# Patient Record
Sex: FEMALE | Race: WHITE | NOT HISPANIC OR LATINO | Employment: OTHER | URBAN - METROPOLITAN AREA
[De-identification: names, ages, dates, MRNs, and addresses within clinical notes are randomized per-mention and may not be internally consistent; named-entity substitution may affect disease eponyms.]

---

## 2017-01-17 ENCOUNTER — OFFICE VISIT (OUTPATIENT)
Dept: CARDIOLOGY | Facility: MEDICAL CENTER | Age: 72
End: 2017-01-17
Payer: MEDICARE

## 2017-01-17 VITALS
SYSTOLIC BLOOD PRESSURE: 130 MMHG | OXYGEN SATURATION: 94 % | HEIGHT: 67 IN | DIASTOLIC BLOOD PRESSURE: 70 MMHG | WEIGHT: 239 LBS | HEART RATE: 74 BPM | BODY MASS INDEX: 37.51 KG/M2

## 2017-01-17 DIAGNOSIS — I25.83 CORONARY ARTERY DISEASE DUE TO LIPID RICH PLAQUE: Chronic | ICD-10-CM

## 2017-01-17 DIAGNOSIS — I25.810 CORONARY ARTERY DISEASE INVOLVING OTHER CORONARY ARTERY BYPASS GRAFT WITHOUT ANGINA PECTORIS: ICD-10-CM

## 2017-01-17 DIAGNOSIS — E78.5 DYSLIPIDEMIA: Chronic | ICD-10-CM

## 2017-01-17 DIAGNOSIS — Z95.5 PRESENCE OF DRUG COATED STENT IN RIGHT CORONARY ARTERY: Chronic | ICD-10-CM

## 2017-01-17 DIAGNOSIS — I25.10 CORONARY ARTERY DISEASE DUE TO LIPID RICH PLAQUE: Chronic | ICD-10-CM

## 2017-01-17 DIAGNOSIS — I10 ESSENTIAL HYPERTENSION: Chronic | ICD-10-CM

## 2017-01-17 LAB — EKG IMPRESSION: NORMAL

## 2017-01-17 PROCEDURE — 93000 ELECTROCARDIOGRAM COMPLETE: CPT | Performed by: INTERNAL MEDICINE

## 2017-01-17 PROCEDURE — 99204 OFFICE O/P NEW MOD 45 MIN: CPT | Mod: 25 | Performed by: INTERNAL MEDICINE

## 2017-01-17 RX ORDER — LOSARTAN POTASSIUM 50 MG/1
50 TABLET ORAL DAILY
COMMUNITY
End: 2017-01-17 | Stop reason: SDUPTHER

## 2017-01-17 RX ORDER — METOPROLOL SUCCINATE 50 MG/1
50 TABLET, EXTENDED RELEASE ORAL DAILY
COMMUNITY
End: 2017-01-17 | Stop reason: SDUPTHER

## 2017-01-17 RX ORDER — OMEGA-3 FATTY ACIDS/FISH OIL 300-1000MG
1 CAPSULE ORAL DAILY
COMMUNITY
End: 2021-04-05

## 2017-01-17 RX ORDER — LISINOPRIL 20 MG/1
20 TABLET ORAL DAILY
COMMUNITY
End: 2017-01-17

## 2017-01-17 RX ORDER — METOPROLOL SUCCINATE 50 MG/1
50 TABLET, EXTENDED RELEASE ORAL DAILY
Qty: 90 TAB | Refills: 3 | Status: SHIPPED | OUTPATIENT
Start: 2017-01-17 | End: 2018-01-17

## 2017-01-17 RX ORDER — LOSARTAN POTASSIUM 100 MG/1
100 TABLET ORAL DAILY
Qty: 90 TAB | Refills: 3 | Status: SHIPPED | OUTPATIENT
Start: 2017-01-17 | End: 2017-01-17 | Stop reason: SDUPTHER

## 2017-01-17 RX ORDER — ASPIRIN 81 MG/1
81 TABLET, CHEWABLE ORAL DAILY
Qty: 100 TAB | COMMUNITY
Start: 2017-01-17

## 2017-01-17 RX ORDER — METOPROLOL SUCCINATE 50 MG/1
50 TABLET, EXTENDED RELEASE ORAL DAILY
Qty: 90 TAB | Refills: 3 | Status: SHIPPED | OUTPATIENT
Start: 2017-01-17 | End: 2017-01-17 | Stop reason: SDUPTHER

## 2017-01-17 RX ORDER — LOSARTAN POTASSIUM 100 MG/1
100 TABLET ORAL DAILY
Qty: 90 TAB | Refills: 3 | Status: SHIPPED | OUTPATIENT
Start: 2017-01-17 | End: 2018-01-17 | Stop reason: SDUPTHER

## 2017-01-17 RX ORDER — ATORVASTATIN CALCIUM 20 MG/1
20 TABLET, FILM COATED ORAL DAILY
Qty: 90 TAB | Refills: 3 | Status: SHIPPED | OUTPATIENT
Start: 2017-01-17 | End: 2018-01-17 | Stop reason: SDUPTHER

## 2017-01-17 RX ORDER — ATORVASTATIN CALCIUM 20 MG/1
20 TABLET, FILM COATED ORAL NIGHTLY
COMMUNITY
End: 2017-01-17 | Stop reason: SDUPTHER

## 2017-01-17 RX ORDER — ATORVASTATIN CALCIUM 20 MG/1
20 TABLET, FILM COATED ORAL DAILY
Qty: 90 TAB | Refills: 3 | Status: SHIPPED | OUTPATIENT
Start: 2017-01-17 | End: 2017-01-17 | Stop reason: SDUPTHER

## 2017-01-17 ASSESSMENT — ENCOUNTER SYMPTOMS
NAUSEA: 0
CLAUDICATION: 0
SORE THROAT: 0
BLURRED VISION: 0
BRUISES/BLEEDS EASILY: 0
FOCAL WEAKNESS: 0
DIZZINESS: 0
PND: 0
SHORTNESS OF BREATH: 0
COUGH: 0
WEAKNESS: 0
ABDOMINAL PAIN: 0
CHILLS: 0
HEADACHES: 0
FALLS: 0
FEVER: 0
PALPITATIONS: 0
LOSS OF CONSCIOUSNESS: 0

## 2017-01-17 NOTE — MR AVS SNAPSHOT
"        Sania Viera   2017 3:15 PM   Office Visit   MRN: [id number]    Department:  Heart [facility]   Dept Phone:  [phone]    Description:  Female : 1945   Provider:  Yury Pagan M.D.           Reason for Visit     New Patient           Allergies as of 2017     Not on File      You were diagnosed with     Coronary artery disease involving other coronary artery bypass graft without angina pectoris   [3090320]       Presence of drug coated stent in right coronary artery   [886103]       Coronary artery disease due to lipid rich plaque   [6837492]       Essential hypertension   [8389807]       Dyslipidemia   [900403]         Vital Signs     Blood Pressure Pulse Height Weight Body Mass Index Oxygen Saturation    130/70 mmHg 74 1.702 m (5' 7\") 108.41 kg (239 lb) 37.42 kg/m2 94%    Smoking Status                   Former Smoker           Basic Information     Date Of Birth Sex Race Ethnicity Preferred Language    1945 Female Unable to Obtain Unknown English      Problem List              ICD-10-CM Priority Class Noted - Resolved    Presence of drug coated stent in right coronary artery (Chronic) Z95.5   Unknown - Present    Coronary artery disease due to lipid rich plaque (Chronic) I25.10, I25.83   Unknown - Present    Essential hypertension (Chronic) I10   Unknown - Present    Dyslipidemia (Chronic) E78.5   Unknown - Present      Health Maintenance     Patient has no pending health maintenance at this time      Results       Current Immunizations     No immunizations on file.      Below and/or attached are the medications your provider expects you to take. Review all of your home medications and newly ordered medications with your provider and/or pharmacist. Follow medication instructions as directed by your provider and/or pharmacist. Please keep your medication list with you and share with your provider. Update the information when medications are discontinued, doses are changed, or " new medications (including over-the-counter products) are added; and carry medication information at all times in the event of emergency situations     Allergies:  No Known Allergies          Medications  Valid as of: January 17, 2017 -  4:00 PM    Generic Name Brand Name Tablet Size Instructions for use    Atorvastatin Calcium (Tab) LIPITOR 20 MG Take 1 Tab by mouth every day.        Losartan Potassium (Tab) COZAAR 100 MG Take 1 Tab by mouth every day.        Metoprolol Succinate (TABLET SR 24 HR) TOPROL XL 50 MG Take 1 Tab by mouth every day.        Omega-3 Fatty Acids (Cap) Omega 3 1000 MG Take  by mouth.        .                 Medicines prescribed today were sent to:     [facility], [facility], [place] - [address]    [address] [address]    Phone: [phone] Fax: [phone]    Open 24 Hours?: No    [facility] - [place], [place] - [address] [facility]    [address] [facility] [address]    Phone: [phone] Fax: [phone]    Open 24 Hours?: No      Medication refill instructions:       If your prescription bottle indicates you have medication refills left, it is not necessary to call your provider’s office. Please contact your pharmacy and they will refill your medication.    If your prescription bottle indicates you do not have any refills left, you may request refills at any time through one of the following ways: The online [employer] system (except Urgent Care), by calling your provider’s office, or by asking your pharmacy to contact your provider’s office with a refill request. Medication refills are processed only during regular business hours and may not be available until the next business day. Your provider may request additional information or to have a follow-up visit with you prior to refilling your medication.   *Please Note: Medication refills are assigned a new Rx number when refilled electronically. Your pharmacy may indicate that no refills were authorized  even though a new prescription for the same medication is available at the pharmacy. Please request the medicine by name with the pharmacy before contacting your provider for a refill.        Instructions    Stop lisinopril it may be causing a cough      Increase losartan to 100 mg a day    Take aspirin 81 mg daily            MyChart Status: Patient Declined

## 2017-01-17 NOTE — PATIENT INSTRUCTIONS
Stop lisinopril it may be causing a cough      Increase losartan to 100 mg a day    Take aspirin 81 mg daily

## 2017-01-17 NOTE — Clinical Note
[facility]   [address] [facility], [facility] [address]  [place] [address]  Phone: [phone]  Fax: [phone]              Sania Viera  1945    Encounter Date: 1/17/2017    Yury Pagan M.D.          PROGRESS NOTE:  Subjective:   Sania Viera is a 71 y.o. female who presents today in consultation from Dr. Virk to establish care with a prior history of coronary artery disease status post stenting in 2005.      She reports long-term blood pressure has been intermittently compliant with medications, somewhat due to financing somewhat due to her preferences.  She doesn't believe she had significant side effect but on questioning likely has ACE inhibitor induced cough    She reports in 2006 she was feeling unwell and underwent workup including angiogram she was found to have significant narrowing in the right coronary artery and underwent successful stenting with a drug-eluting stent, she believes she was told she had residual high-grade narrowing in the left artery but it was not amenable to stenting.      Past Medical History   Diagnosis Date   • Presence of drug coated stent in right coronary artery    • Coronary artery disease due to lipid rich plaque    • Essential hypertension    • Dyslipidemia      Past Surgical History   Procedure Laterality Date   • Cardiac cath     • Angioplasty  5/2006     RCA IVANIA in [facility]     Family History   Problem Relation Age of Onset   • Clotting Disorder Mother    • Heart Attack Father    • Heart Disease Brother    • Diabetes Brother      History   Smoking status   • Former Smoker   Smokeless tobacco   • Not on file     Allergies   Allergen Reactions   • Iodine Rash and Itching     She reports rash and severe significant itching after her cardiac catheterization in 2006, unclear if this was the topical scrub or related to IV contrast, for these reasons she does avoid shellfish     Outpatient Encounter Prescriptions as of  "1/17/2017   Medication Sig Dispense Refill   • Omega 3 1000 MG Cap Take  by mouth.     • atorvastatin (LIPITOR) 20 MG Tab Take 1 Tab by mouth every day. 90 Tab 3   • losartan (COZAAR) 100 MG Tab Take 1 Tab by mouth every day. 90 Tab 3   • metoprolol SR (TOPROL XL) 50 MG TABLET SR 24 HR Take 1 Tab by mouth every day. 90 Tab 3   • [DISCONTINUED] atorvastatin (LIPITOR) 20 MG Tab Take 20 mg by mouth every evening.     • [DISCONTINUED] losartan (COZAAR) 50 MG Tab Take 50 mg by mouth every day.     • [DISCONTINUED] lisinopril (PRINIVIL) 20 MG Tab Take 20 mg by mouth every day.     • [DISCONTINUED] metoprolol SR (TOPROL XL) 50 MG TABLET SR 24 HR Take 50 mg by mouth every day.     • [DISCONTINUED] atorvastatin (LIPITOR) 20 MG Tab Take 1 Tab by mouth every day. 90 Tab 3   • [DISCONTINUED] losartan (COZAAR) 100 MG Tab Take 1 Tab by mouth every day. 90 Tab 3   • [DISCONTINUED] metoprolol SR (TOPROL XL) 50 MG TABLET SR 24 HR Take 1 Tab by mouth every day. 90 Tab 3     No facility-administered encounter medications on file as of 1/17/2017.     Review of Systems   Constitutional: Negative for fever and chills.   HENT: Negative for sore throat.    Eyes: Negative for blurred vision.   Respiratory: Negative for cough and shortness of breath.    Cardiovascular: Negative for chest pain, palpitations, claudication, leg swelling and PND.   Gastrointestinal: Negative for nausea and abdominal pain.   Musculoskeletal: Negative for falls.   Skin: Negative for rash.   Neurological: Negative for dizziness, focal weakness, loss of consciousness, weakness and headaches.   Endo/Heme/Allergies: Does not bruise/bleed easily.        Objective:   /70 mmHg  Pulse 74  Ht 1.702 m (5' 7\")  Wt 108.41 kg (239 lb)  BMI 37.42 kg/m2  SpO2 94%    Physical Exam   Constitutional: No distress.   HENT:   Mouth/Throat: Oropharynx is clear and moist.   Eyes: No scleral icterus.   Cardiovascular: Normal rate, regular rhythm and intact distal pulses.  Exam " "reveals no gallop and no friction rub.    No murmur heard.  Pulmonary/Chest: Effort normal and breath sounds normal. She has no rales.   Abdominal: Bowel sounds are normal. There is no tenderness.   Musculoskeletal: She exhibits no edema.   Neurological: She is alert.   Skin: No rash noted. She is not diaphoretic.   Psychiatric: She has a normal mood and affect.     We reviewed her recent labs A1c was 6.2 lipids were at goal on statin. LDL 69 HDL 40    Assessment:     1. Coronary artery disease involving other coronary artery bypass graft without angina pectoris  EKG    atorvastatin (LIPITOR) 20 MG Tab    metoprolol SR (TOPROL XL) 50 MG TABLET SR 24 HR    DISCONTINUED: atorvastatin (LIPITOR) 20 MG Tab    DISCONTINUED: metoprolol SR (TOPROL XL) 50 MG TABLET SR 24 HR   2. Presence of drug coated stent in right coronary artery     3. Coronary artery disease due to lipid rich plaque  atorvastatin (LIPITOR) 20 MG Tab    DISCONTINUED: atorvastatin (LIPITOR) 20 MG Tab   4. Essential hypertension  losartan (COZAAR) 100 MG Tab    metoprolol SR (TOPROL XL) 50 MG TABLET SR 24 HR    DISCONTINUED: losartan (COZAAR) 100 MG Tab    DISCONTINUED: metoprolol SR (TOPROL XL) 50 MG TABLET SR 24 HR   5. Dyslipidemia         Medical Decision Making:  Today's Assessment / Status / Plan:     It was my pleasure to meet with Ms. Viera.    She is accompanied by her daughter-in-law    I renewed her heart medications, given the concern of cough with the ACE inhibitor I've asked her to stop that she was taking losartan and lisinopril together and so I increased her losartan to 100 from 50 mg    Her lipids are well-controlled on moderate dose statin    Encouraged to take a baby aspirin every day    She'll let us know for significant swelling or other heart failure symptoms we'll obtain her prior records she does report \"normal\" stress test about 2 years ago.    I will see Ms. Viera back in 1 year time and encouraged her to follow up with us over " the phone or e-mail using my MyChart as issues arise.    It is my pleasure to participate in the care of Ms. Viera.  Please do not hesitate to contact me with questions or concerns.    Yury Pagan MD PhD [facility]  Cardiologist [facility]        Yury Virk M.D.  [address] [place] [id number]  VIA Facsimile: [phone]

## 2017-01-18 NOTE — PROGRESS NOTES
Subjective:   Sania Viera is a 71 y.o. female who presents today in consultation from Dr. Virk to establish care with a prior history of coronary artery disease status post stenting in 2005.      She reports long-term blood pressure has been intermittently compliant with medications, somewhat due to financing somewhat due to her preferences.  She doesn't believe she had significant side effect but on questioning likely has ACE inhibitor induced cough    She reports in 2006 she was feeling unwell and underwent workup including angiogram she was found to have significant narrowing in the right coronary artery and underwent successful stenting with a drug-eluting stent, she believes she was told she had residual high-grade narrowing in the left artery but it was not amenable to stenting.      Past Medical History   Diagnosis Date   • Presence of drug coated stent in right coronary artery    • Coronary artery disease due to lipid rich plaque    • Essential hypertension    • Dyslipidemia      Past Surgical History   Procedure Laterality Date   • Cardiac cath     • Angioplasty  5/2006     RCA IVANIA in [facility]     Family History   Problem Relation Age of Onset   • Clotting Disorder Mother    • Heart Attack Father    • Heart Disease Brother    • Diabetes Brother      History   Smoking status   • Former Smoker   Smokeless tobacco   • Not on file     Allergies   Allergen Reactions   • Iodine Rash and Itching     She reports rash and severe significant itching after her cardiac catheterization in 2006, unclear if this was the topical scrub or related to IV contrast, for these reasons she does avoid shellfish     Outpatient Encounter Prescriptions as of 1/17/2017   Medication Sig Dispense Refill   • Omega 3 1000 MG Cap Take  by mouth.     • atorvastatin (LIPITOR) 20 MG Tab Take 1 Tab by mouth every day. 90 Tab 3   • losartan (COZAAR) 100 MG Tab Take 1 Tab by mouth every day. 90 Tab 3   • metoprolol SR (TOPROL XL) 50 MG  "TABLET SR 24 HR Take 1 Tab by mouth every day. 90 Tab 3   • [DISCONTINUED] atorvastatin (LIPITOR) 20 MG Tab Take 20 mg by mouth every evening.     • [DISCONTINUED] losartan (COZAAR) 50 MG Tab Take 50 mg by mouth every day.     • [DISCONTINUED] lisinopril (PRINIVIL) 20 MG Tab Take 20 mg by mouth every day.     • [DISCONTINUED] metoprolol SR (TOPROL XL) 50 MG TABLET SR 24 HR Take 50 mg by mouth every day.     • [DISCONTINUED] atorvastatin (LIPITOR) 20 MG Tab Take 1 Tab by mouth every day. 90 Tab 3   • [DISCONTINUED] losartan (COZAAR) 100 MG Tab Take 1 Tab by mouth every day. 90 Tab 3   • [DISCONTINUED] metoprolol SR (TOPROL XL) 50 MG TABLET SR 24 HR Take 1 Tab by mouth every day. 90 Tab 3     No facility-administered encounter medications on file as of 1/17/2017.     Review of Systems   Constitutional: Negative for fever and chills.   HENT: Negative for sore throat.    Eyes: Negative for blurred vision.   Respiratory: Negative for cough and shortness of breath.    Cardiovascular: Negative for chest pain, palpitations, claudication, leg swelling and PND.   Gastrointestinal: Negative for nausea and abdominal pain.   Musculoskeletal: Negative for falls.   Skin: Negative for rash.   Neurological: Negative for dizziness, focal weakness, loss of consciousness, weakness and headaches.   Endo/Heme/Allergies: Does not bruise/bleed easily.        Objective:   /70 mmHg  Pulse 74  Ht 1.702 m (5' 7\")  Wt 108.41 kg (239 lb)  BMI 37.42 kg/m2  SpO2 94%    Physical Exam   Constitutional: No distress.   HENT:   Mouth/Throat: Oropharynx is clear and moist.   Eyes: No scleral icterus.   Cardiovascular: Normal rate, regular rhythm and intact distal pulses.  Exam reveals no gallop and no friction rub.    No murmur heard.  Pulmonary/Chest: Effort normal and breath sounds normal. She has no rales.   Abdominal: Bowel sounds are normal. There is no tenderness.   Musculoskeletal: She exhibits no edema.   Neurological: She is alert. " "  Skin: No rash noted. She is not diaphoretic.   Psychiatric: She has a normal mood and affect.     We reviewed her recent labs A1c was 6.2 lipids were at goal on statin. LDL 69 HDL 40    Assessment:     1. Coronary artery disease involving other coronary artery bypass graft without angina pectoris  EKG    atorvastatin (LIPITOR) 20 MG Tab    metoprolol SR (TOPROL XL) 50 MG TABLET SR 24 HR    DISCONTINUED: atorvastatin (LIPITOR) 20 MG Tab    DISCONTINUED: metoprolol SR (TOPROL XL) 50 MG TABLET SR 24 HR   2. Presence of drug coated stent in right coronary artery     3. Coronary artery disease due to lipid rich plaque  atorvastatin (LIPITOR) 20 MG Tab    DISCONTINUED: atorvastatin (LIPITOR) 20 MG Tab   4. Essential hypertension  losartan (COZAAR) 100 MG Tab    metoprolol SR (TOPROL XL) 50 MG TABLET SR 24 HR    DISCONTINUED: losartan (COZAAR) 100 MG Tab    DISCONTINUED: metoprolol SR (TOPROL XL) 50 MG TABLET SR 24 HR   5. Dyslipidemia         Medical Decision Making:  Today's Assessment / Status / Plan:     It was my pleasure to meet with Ms. Viera.    She is accompanied by her daughter-in-law    I renewed her heart medications, given the concern of cough with the ACE inhibitor I've asked her to stop that she was taking losartan and lisinopril together and so I increased her losartan to 100 from 50 mg    Her lipids are well-controlled on moderate dose statin    Encouraged to take a baby aspirin every day    She'll let us know for significant swelling or other heart failure symptoms we'll obtain her prior records she does report \"normal\" stress test about 2 years ago.    I will see Ms. Viera back in 1 year time and encouraged her to follow up with us over the phone or e-mail using my MyChart as issues arise.    It is my pleasure to participate in the care of Ms. Viera.  Please do not hesitate to contact me with questions or concerns.    Yury Pagan MD PhD FACC  Cardiologist [facility] " [facility]

## 2017-03-02 ENCOUNTER — HOSPITAL ENCOUNTER (OUTPATIENT)
Facility: MEDICAL CENTER | Age: 72
End: 2017-03-02
Attending: FAMILY MEDICINE
Payer: MEDICARE

## 2017-03-02 LAB
ALBUMIN SERPL BCP-MCNC: 3.8 G/DL (ref 3.2–4.9)
ALBUMIN/GLOB SERPL: 1 G/DL
ALP SERPL-CCNC: 66 U/L (ref 30–99)
ALT SERPL-CCNC: 24 U/L (ref 2–50)
ANION GAP SERPL CALC-SCNC: 8 MMOL/L (ref 0–11.9)
AST SERPL-CCNC: 32 U/L (ref 12–45)
BILIRUB SERPL-MCNC: 0.4 MG/DL (ref 0.1–1.5)
BNP SERPL-MCNC: 75 PG/ML (ref 0–100)
BUN SERPL-MCNC: 14 MG/DL (ref 8–22)
CALCIUM SERPL-MCNC: 9.4 MG/DL (ref 8.5–10.5)
CHLORIDE SERPL-SCNC: 103 MMOL/L (ref 96–112)
CHOLEST SERPL-MCNC: 148 MG/DL (ref 100–199)
CO2 SERPL-SCNC: 29 MMOL/L (ref 20–33)
CREAT SERPL-MCNC: 0.73 MG/DL (ref 0.5–1.4)
CREAT UR-MCNC: 144.5 MG/DL
ERYTHROCYTE [DISTWIDTH] IN BLOOD BY AUTOMATED COUNT: 45.9 FL (ref 35.9–50)
EST. AVERAGE GLUCOSE BLD GHB EST-MCNC: 126 MG/DL
GLOBULIN SER CALC-MCNC: 3.8 G/DL (ref 1.9–3.5)
GLUCOSE SERPL-MCNC: 94 MG/DL (ref 65–99)
HBA1C MFR BLD: 6 % (ref 0–5.6)
HCT VFR BLD AUTO: 48.9 % (ref 37–47)
HDLC SERPL-MCNC: 39 MG/DL
HGB BLD-MCNC: 15.1 G/DL (ref 12–16)
LDLC SERPL CALC-MCNC: 75 MG/DL
MCH RBC QN AUTO: 29.8 PG (ref 27–33)
MCHC RBC AUTO-ENTMCNC: 30.9 G/DL (ref 33.6–35)
MCV RBC AUTO: 96.6 FL (ref 81.4–97.8)
MICROALBUMIN UR-MCNC: 42.9 MG/DL
MICROALBUMIN/CREAT UR: 297 MG/G (ref 0–30)
PLATELET # BLD AUTO: 238 K/UL (ref 164–446)
PMV BLD AUTO: 10.5 FL (ref 9–12.9)
POTASSIUM SERPL-SCNC: 4.2 MMOL/L (ref 3.6–5.5)
PROT SERPL-MCNC: 7.6 G/DL (ref 6–8.2)
RBC # BLD AUTO: 5.06 M/UL (ref 4.2–5.4)
SODIUM SERPL-SCNC: 140 MMOL/L (ref 135–145)
TRIGL SERPL-MCNC: 170 MG/DL (ref 0–149)
WBC # BLD AUTO: 5.7 K/UL (ref 4.8–10.8)

## 2017-03-02 PROCEDURE — 80061 LIPID PANEL: CPT

## 2017-03-02 PROCEDURE — 82043 UR ALBUMIN QUANTITATIVE: CPT

## 2017-03-02 PROCEDURE — 80053 COMPREHEN METABOLIC PANEL: CPT

## 2017-03-02 PROCEDURE — 83036 HEMOGLOBIN GLYCOSYLATED A1C: CPT

## 2017-03-02 PROCEDURE — 83880 ASSAY OF NATRIURETIC PEPTIDE: CPT

## 2017-03-02 PROCEDURE — 82570 ASSAY OF URINE CREATININE: CPT

## 2017-03-02 PROCEDURE — 85027 COMPLETE CBC AUTOMATED: CPT

## 2017-03-13 ENCOUNTER — HOSPITAL ENCOUNTER (OUTPATIENT)
Dept: RADIOLOGY | Facility: MEDICAL CENTER | Age: 72
End: 2017-03-13
Attending: FAMILY MEDICINE
Payer: MEDICARE

## 2017-03-13 DIAGNOSIS — M81.0 OSTEOPOROSIS, UNSPECIFIED: ICD-10-CM

## 2017-03-13 DIAGNOSIS — Z12.31 SCREENING MAMMOGRAM, ENCOUNTER FOR: ICD-10-CM

## 2017-03-13 PROCEDURE — 77080 DXA BONE DENSITY AXIAL: CPT

## 2017-03-13 PROCEDURE — 77063 BREAST TOMOSYNTHESIS BI: CPT

## 2017-03-22 ENCOUNTER — HOSPITAL ENCOUNTER (OUTPATIENT)
Facility: MEDICAL CENTER | Age: 72
End: 2017-03-22
Attending: NURSE PRACTITIONER
Payer: MEDICARE

## 2017-03-22 LAB — AMBIGUOUS DTTM AMBI4: NORMAL

## 2017-03-22 PROCEDURE — 87624 HPV HI-RISK TYP POOLED RSLT: CPT

## 2017-03-22 PROCEDURE — 87491 CHLMYD TRACH DNA AMP PROBE: CPT | Mod: 91

## 2017-03-22 PROCEDURE — 87591 N.GONORRHOEAE DNA AMP PROB: CPT | Mod: 91

## 2017-03-22 PROCEDURE — 88175 CYTOPATH C/V AUTO FLUID REDO: CPT

## 2017-04-18 ENCOUNTER — HOSPITAL ENCOUNTER (OUTPATIENT)
Dept: RADIOLOGY | Facility: MEDICAL CENTER | Age: 72
End: 2017-04-18
Attending: FAMILY MEDICINE
Payer: MEDICARE

## 2017-04-18 DIAGNOSIS — R92.8 ABNORMAL MAMMOGRAM OF RIGHT BREAST: ICD-10-CM

## 2017-04-18 PROCEDURE — 76642 ULTRASOUND BREAST LIMITED: CPT | Mod: RT

## 2017-04-18 PROCEDURE — G0279 TOMOSYNTHESIS, MAMMO: HCPCS | Mod: RT

## 2017-04-19 ENCOUNTER — TELEPHONE (OUTPATIENT)
Dept: RADIOLOGY | Facility: MEDICAL CENTER | Age: 72
End: 2017-04-19

## 2017-04-19 NOTE — TELEPHONE ENCOUNTER
ATTEMPTED TO CONTACT PT & ADVISE NO NEED TO HOLD MEDICATION AS SHE TAKES BLOOD PRESSURE MEDS, NOT BLOOD THINNERS; UNABLE TO LEAVE [facility] AS VM BOX NOT SET UP; WILL ATTEMPT CALL LATER TODAY/TML

## 2017-04-19 NOTE — TELEPHONE ENCOUNTER
ADVISED PT THAT THE MEDICATIONS SHE TAKES ARE FOR BLOOD PRESSURE, THEY ARE NOT BLOOD THINNERS; NO NEED TO HOLD FOR BX APPT; PT WAS ADVISED SHE HAS OPTION TO HOLD ASPIRIN/TML

## 2017-04-20 ENCOUNTER — TELEPHONE (OUTPATIENT)
Dept: RADIOLOGY | Facility: MEDICAL CENTER | Age: 72
End: 2017-04-20

## 2017-04-21 ENCOUNTER — HOSPITAL ENCOUNTER (OUTPATIENT)
Dept: RADIOLOGY | Facility: MEDICAL CENTER | Age: 72
End: 2017-04-21
Attending: FAMILY MEDICINE
Payer: MEDICARE

## 2017-04-21 DIAGNOSIS — R92.8 ABNORMAL MAMMOGRAM: ICD-10-CM

## 2017-04-21 PROCEDURE — 88305 TISSUE EXAM BY PATHOLOGIST: CPT | Mod: 59

## 2017-04-21 PROCEDURE — 19083 BX BREAST 1ST LESION US IMAG: CPT

## 2017-04-21 PROCEDURE — 19084 BX BREAST ADD LESION US IMAG: CPT

## 2017-04-24 ENCOUNTER — TELEPHONE (OUTPATIENT)
Dept: RADIOLOGY | Facility: MEDICAL CENTER | Age: 72
End: 2017-04-24

## 2017-07-05 ENCOUNTER — HOSPITAL ENCOUNTER (OUTPATIENT)
Facility: MEDICAL CENTER | Age: 72
End: 2017-07-05
Attending: FAMILY MEDICINE
Payer: MEDICARE

## 2017-07-05 LAB — GFR SERPL CREATININE-BSD FRML MDRD: >60 ML/MIN/1.73 M 2

## 2017-07-05 PROCEDURE — 82043 UR ALBUMIN QUANTITATIVE: CPT

## 2017-07-05 PROCEDURE — 80053 COMPREHEN METABOLIC PANEL: CPT

## 2017-07-05 PROCEDURE — 84439 ASSAY OF FREE THYROXINE: CPT

## 2017-07-05 PROCEDURE — 84443 ASSAY THYROID STIM HORMONE: CPT

## 2017-07-05 PROCEDURE — 82570 ASSAY OF URINE CREATININE: CPT

## 2017-07-05 PROCEDURE — 85025 COMPLETE CBC W/AUTO DIFF WBC: CPT

## 2017-07-05 PROCEDURE — 83036 HEMOGLOBIN GLYCOSYLATED A1C: CPT

## 2017-07-05 PROCEDURE — 83880 ASSAY OF NATRIURETIC PEPTIDE: CPT

## 2017-07-06 LAB
ALBUMIN SERPL BCP-MCNC: 3.8 G/DL (ref 3.2–4.9)
ALBUMIN/GLOB SERPL: 1.2 G/DL
ALP SERPL-CCNC: 63 U/L (ref 30–99)
ALT SERPL-CCNC: 31 U/L (ref 2–50)
ANION GAP SERPL CALC-SCNC: 9 MMOL/L (ref 0–11.9)
AST SERPL-CCNC: 39 U/L (ref 12–45)
BASOPHILS # BLD AUTO: 0.6 % (ref 0–1.8)
BASOPHILS # BLD: 0.04 K/UL (ref 0–0.12)
BILIRUB SERPL-MCNC: 0.5 MG/DL (ref 0.1–1.5)
BNP SERPL-MCNC: 51 PG/ML (ref 0–100)
BUN SERPL-MCNC: 13 MG/DL (ref 8–22)
CALCIUM SERPL-MCNC: 9.1 MG/DL (ref 8.5–10.5)
CHLORIDE SERPL-SCNC: 106 MMOL/L (ref 96–112)
CO2 SERPL-SCNC: 30 MMOL/L (ref 20–33)
CREAT SERPL-MCNC: 0.87 MG/DL (ref 0.5–1.4)
CREAT UR-MCNC: 184.9 MG/DL
EOSINOPHIL # BLD AUTO: 0.11 K/UL (ref 0–0.51)
EOSINOPHIL NFR BLD: 1.6 % (ref 0–6.9)
ERYTHROCYTE [DISTWIDTH] IN BLOOD BY AUTOMATED COUNT: 47.8 FL (ref 35.9–50)
EST. AVERAGE GLUCOSE BLD GHB EST-MCNC: 126 MG/DL
GLOBULIN SER CALC-MCNC: 3.3 G/DL (ref 1.9–3.5)
GLUCOSE SERPL-MCNC: 109 MG/DL (ref 65–99)
HBA1C MFR BLD: 6 % (ref 0–5.6)
HCT VFR BLD AUTO: 46.9 % (ref 37–47)
HGB BLD-MCNC: 14.3 G/DL (ref 12–16)
IMM GRANULOCYTES # BLD AUTO: 0.03 K/UL (ref 0–0.11)
IMM GRANULOCYTES NFR BLD AUTO: 0.4 % (ref 0–0.9)
LYMPHOCYTES # BLD AUTO: 2.35 K/UL (ref 1–4.8)
LYMPHOCYTES NFR BLD: 33.8 % (ref 22–41)
MCH RBC QN AUTO: 29.9 PG (ref 27–33)
MCHC RBC AUTO-ENTMCNC: 30.5 G/DL (ref 33.6–35)
MCV RBC AUTO: 98.1 FL (ref 81.4–97.8)
MICROALBUMIN UR-MCNC: 13 MG/DL
MICROALBUMIN/CREAT UR: 70 MG/G (ref 0–30)
MONOCYTES # BLD AUTO: 0.72 K/UL (ref 0–0.85)
MONOCYTES NFR BLD AUTO: 10.3 % (ref 0–13.4)
NEUTROPHILS # BLD AUTO: 3.71 K/UL (ref 2–7.15)
NEUTROPHILS NFR BLD: 53.3 % (ref 44–72)
NRBC # BLD AUTO: 0 K/UL
NRBC BLD AUTO-RTO: 0 /100 WBC
PLATELET # BLD AUTO: 204 K/UL (ref 164–446)
PMV BLD AUTO: 10.4 FL (ref 9–12.9)
POTASSIUM SERPL-SCNC: 4 MMOL/L (ref 3.6–5.5)
PROT SERPL-MCNC: 7.1 G/DL (ref 6–8.2)
RBC # BLD AUTO: 4.78 M/UL (ref 4.2–5.4)
SODIUM SERPL-SCNC: 145 MMOL/L (ref 135–145)
T4 FREE SERPL-MCNC: 0.82 NG/DL (ref 0.53–1.43)
TSH SERPL DL<=0.005 MIU/L-ACNC: 4.01 UIU/ML (ref 0.3–3.7)
WBC # BLD AUTO: 7 K/UL (ref 4.8–10.8)

## 2017-07-21 ENCOUNTER — OFFICE VISIT (OUTPATIENT)
Dept: CARDIOLOGY | Facility: MEDICAL CENTER | Age: 72
End: 2017-07-21
Payer: MEDICARE

## 2017-07-21 VITALS
BODY MASS INDEX: 37.67 KG/M2 | HEART RATE: 68 BPM | HEIGHT: 67 IN | OXYGEN SATURATION: 96 % | SYSTOLIC BLOOD PRESSURE: 170 MMHG | WEIGHT: 240 LBS | DIASTOLIC BLOOD PRESSURE: 90 MMHG

## 2017-07-21 DIAGNOSIS — I10 ESSENTIAL HYPERTENSION: Chronic | ICD-10-CM

## 2017-07-21 DIAGNOSIS — E66.9 OBESITY, UNSPECIFIED OBESITY SEVERITY, UNSPECIFIED OBESITY TYPE: ICD-10-CM

## 2017-07-21 DIAGNOSIS — I25.10 CORONARY ARTERY DISEASE DUE TO LIPID RICH PLAQUE: Chronic | ICD-10-CM

## 2017-07-21 DIAGNOSIS — I25.83 CORONARY ARTERY DISEASE DUE TO LIPID RICH PLAQUE: Chronic | ICD-10-CM

## 2017-07-21 DIAGNOSIS — Z95.5 PRESENCE OF DRUG COATED STENT IN RIGHT CORONARY ARTERY: Chronic | ICD-10-CM

## 2017-07-21 DIAGNOSIS — E78.5 DYSLIPIDEMIA: Chronic | ICD-10-CM

## 2017-07-21 PROCEDURE — 99214 OFFICE O/P EST MOD 30 MIN: CPT | Performed by: NURSE PRACTITIONER

## 2017-07-21 ASSESSMENT — ENCOUNTER SYMPTOMS
INSOMNIA: 1
ORTHOPNEA: 0
CLAUDICATION: 0
COUGH: 0
FEVER: 0
DIZZINESS: 0
SHORTNESS OF BREATH: 1
PND: 0
ABDOMINAL PAIN: 0
MYALGIAS: 0
PALPITATIONS: 0

## 2017-07-21 NOTE — MR AVS SNAPSHOT
"Sania Viera   2017 12:40 PM   Office Visit   MRN: [id number]    Department:  [facility]   Dept Phone:  [phone]    Description:  Female : 1945   Provider:  ONI Be           Reason for Visit     Follow-Up           Allergies as of 2017     Allergen Noted Reactions    Iodine 2017   Rash, Itching    She reports rash and severe significant itching after her cardiac catheterization in , unclear if this was the topical scrub or related to IV contrast, for these reasons she does avoid shellfish      You were diagnosed with     Coronary artery disease due to lipid rich plaque   [2240988]       Dyslipidemia   [441645]       Essential hypertension   [5808011]       Presence of drug coated stent in right coronary artery   [521854]       Obesity, unspecified obesity severity, unspecified obesity type   [4657163]         Vital Signs     Blood Pressure Pulse Height Weight Body Mass Index Oxygen Saturation    170/90 mmHg 68 1.702 m (5' 7.01\") 108.863 kg (240 lb) 37.58 kg/m2 96%    Smoking Status                   Former Smoker           Basic Information     Date Of Birth Sex Race Ethnicity Preferred Language    1945 Female Unable to Obtain Unknown English      Your appointments     2017  2:00 PM   Nm [facility] with [facility] NM CARDIAC PET   [facility])    [address] [facility] [id number] [phone]            2018 12:45 PM   FOLLOW UP with Yury Pagan M.D.   [facility] (--)    [address], [facility] [address]  [facility] [id number] [phone]              Problem List              ICD-10-CM Priority Class Noted - Resolved    Presence of drug coated stent in right coronary artery (Chronic) Z95.5 Medium  Unknown - Present    Coronary artery disease due to lipid rich plaque (Chronic) I25.10, I25.83 Medium  Unknown - Present    Essential hypertension " (Chronic) I10 Medium  Unknown - Present    Dyslipidemia (Chronic) E78.5 Medium  Unknown - Present    Obesity E66.9 Medium  7/21/2017 - Present      Health Maintenance        Date Due Completion Dates    IMM DTaP/Tdap/Td Vaccine (1 - Tdap) 12/6/1966 ---    COLONOSCOPY 12/6/1997 ---    IMM ZOSTER VACCINE 12/6/2007 ---    IMM PNEUMOCOCCAL 65+ (ADULT) LOW/MEDIUM RISK SERIES (1 of 2 - PCV13) 12/6/2012 ---    IMM INFLUENZA (1) 9/1/2017 ---    MAMMOGRAM 4/18/2018 4/18/2017, 3/13/2017    PAP SMEAR 3/22/2020 3/22/2017    BONE DENSITY 3/13/2022 3/13/2017            Current Immunizations     No immunizations on file.      Below and/or attached are the medications your provider expects you to take. Review all of your home medications and newly ordered medications with your provider and/or pharmacist. Follow medication instructions as directed by your provider and/or pharmacist. Please keep your medication list with you and share with your provider. Update the information when medications are discontinued, doses are changed, or new medications (including over-the-counter products) are added; and carry medication information at all times in the event of emergency situations     Allergies:  IODINE - Rash,Itching               Medications  Valid as of: July 21, 2017 -  1:28 PM    Generic Name Brand Name Tablet Size Instructions for use    Aspirin (Chew Tab) ASA 81 MG Take 1 Tab by mouth every day.        Atorvastatin Calcium (Tab) LIPITOR 20 MG Take 1 Tab by mouth every day.        Cholecalciferol (Tab) cholecalciferol 1000 UNIT Take 1,000 Units by mouth every day.        Losartan Potassium (Tab) COZAAR 100 MG Take 1 Tab by mouth every day.        Metoprolol Succinate (TABLET SR 24 HR) TOPROL XL 50 MG Take 1 Tab by mouth every day.        Omega-3 Fatty Acids (Cap) Omega 3 1000 MG Take  by mouth.        .                 Medicines prescribed today were sent to:     [facility] [place], [place] - [address]     [address] [address]    Phone: [phone] Fax: [phone]    Open 24 Hours?: No      Medication refill instructions:       If your prescription bottle indicates you have medication refills left, it is not necessary to call your provider’s office. Please contact your pharmacy and they will refill your medication.    If your prescription bottle indicates you do not have any refills left, you may request refills at any time through one of the following ways: The online [employer] system (except Urgent Care), by calling your provider’s office, or by asking your pharmacy to contact your provider’s office with a refill request. Medication refills are processed only during regular business hours and may not be available until the next business day. Your provider may request additional information or to have a follow-up visit with you prior to refilling your medication.   *Please Note: Medication refills are assigned a new Rx number when refilled electronically. Your pharmacy may indicate that no refills were authorized even though a new prescription for the same medication is available at the pharmacy. Please request the medicine by name with the pharmacy before contacting your provider for a refill.        Your To Do List     Future Labs/Procedures Complete By Expires    NM-CARDIAC PET  As directed 7/21/2018         [employer] Status: Patient Declined

## 2017-07-21 NOTE — PROGRESS NOTES
Subjective:   Sania Viera is a 71 y.o. female who presents today for follow up for recurrent chest discomfort and LE swelling.    She is a patient of Dr. Pagan in our office. Hx of HTN, HLD, CAD with IVANIA to OM, and obesity.    She states she has been having some intermittent chest pain and shortness of breath with exertion. She also has more leg swelling. She is wearing her compression stockings today, but it is still pitting.    She is having a breast biopsy soon. We received her previous angiogram from '06.     She had no episodes of palpitations or dizziness/lightheadedness.    Past Medical History   Diagnosis Date   • Essential hypertension    • Dyslipidemia    • CAD (coronary artery disease) 2005     IVANIA to OM   • Obesity      Past Surgical History   Procedure Laterality Date   • Cardiac cath     • Angioplasty  5/2006     RCA IVANIA in [facility]     Family History   Problem Relation Age of Onset   • Clotting Disorder Mother    • Heart Attack Father    • Heart Disease Brother    • Diabetes Brother      History   Smoking status   • Former Smoker   Smokeless tobacco   • Not on file     Allergies   Allergen Reactions   • Iodine Rash and Itching     She reports rash and severe significant itching after her cardiac catheterization in 2006, unclear if this was the topical scrub or related to IV contrast, for these reasons she does avoid shellfish     Outpatient Encounter Prescriptions as of 7/21/2017   Medication Sig Dispense Refill   • vitamin D (CHOLECALCIFEROL) 1000 UNIT Tab Take 1,000 Units by mouth every day.     • Omega 3 1000 MG Cap Take  by mouth.     • atorvastatin (LIPITOR) 20 MG Tab Take 1 Tab by mouth every day. 90 Tab 3   • losartan (COZAAR) 100 MG Tab Take 1 Tab by mouth every day. 90 Tab 3   • metoprolol SR (TOPROL XL) 50 MG TABLET SR 24 HR Take 1 Tab by mouth every day. 90 Tab 3   • aspirin (ASA) 81 MG Chew Tab chewable tablet Take 1 Tab by mouth every day. 100 Tab      No facility-administered  "encounter medications on file as of 7/21/2017.     Review of Systems   Constitutional: Positive for malaise/fatigue. Negative for fever.   Respiratory: Positive for shortness of breath. Negative for cough.    Cardiovascular: Positive for chest pain and leg swelling. Negative for palpitations, orthopnea, claudication and PND.   Gastrointestinal: Negative for abdominal pain.   Musculoskeletal: Negative for myalgias.   Neurological: Negative for dizziness.   Psychiatric/Behavioral: The patient has insomnia.    All other systems reviewed and are negative.       Objective:   /90 mmHg  Pulse 68  Ht 1.702 m (5' 7.01\")  Wt 108.863 kg (240 lb)  BMI 37.58 kg/m2  SpO2 96%    Physical Exam   Constitutional: She is oriented to person, place, and time. She appears well-developed. No distress.   obese   HENT:   Head: Normocephalic and atraumatic.   Eyes: EOM are normal.   Neck: Normal range of motion. No JVD present.   Cardiovascular: Normal rate, regular rhythm, normal heart sounds and intact distal pulses.    No murmur heard.  Pulmonary/Chest: Effort normal and breath sounds normal. No respiratory distress.   Abdominal: Soft. Bowel sounds are normal.   Musculoskeletal: Normal range of motion. She exhibits edema.   Bilateral LE edema 1+ pitting   Neurological: She is alert and oriented to person, place, and time.   Skin: Skin is warm and dry.   Psychiatric: She has a normal mood and affect.   Nursing note and vitals reviewed.    Lab Results   Component Value Date/Time    CHOLESTEROL, 03/02/2017 10:20 AM    LDL 75 03/02/2017 10:20 AM    HDL 39* 03/02/2017 10:20 AM    TRIGLYCERIDES 170* 03/02/2017 10:20 AM       Lab Results   Component Value Date/Time    SODIUM 145 07/05/2017 11:30 AM    POTASSIUM 4.0 07/05/2017 11:30 AM    CHLORIDE 106 07/05/2017 11:30 AM    CO2 30 07/05/2017 11:30 AM    GLUCOSE 109* 07/05/2017 11:30 AM    BUN 13 07/05/2017 11:30 AM    CREATININE 0.87 07/05/2017 11:30 AM     Lab Results "   Component Value Date/Time    ALKALINE PHOSPHATASE 63 07/05/2017 11:30 AM    AST(SGOT) 39 07/05/2017 11:30 AM    ALT(SGPT) 31 07/05/2017 11:30 AM    TOTAL BILIRUBIN 0.5 07/05/2017 11:30 AM      Assessment:     1. Coronary artery disease due to lipid rich plaque  NM-CARDIAC PET   2. Dyslipidemia  NM-CARDIAC PET   3. Essential hypertension  NM-CARDIAC PET   4. Presence of drug coated stent in right coronary artery  NM-CARDIAC PET   5. Obesity, unspecified obesity severity, unspecified obesity type  NM-CARDIAC PET     Medical Decision Making:  Today's Assessment / Status / Plan:     1. CAD with previous IVANIA to OM, mild-mod residual disease in RCA and LAD. Continue ASA, metoprolol, and lipitor. New angina and LOVETT- check PET for review. Last stress echo in '14 was negative but with new onset of symptoms alongside upcoming surgery-would recommend stress testing, she is agreeable.    2. HLD, good labs. LDL goal <70, at 75 with last labs. Follow yearly. Statin.    3. HTN, usually good control. She didn't take her pills this morning. She will take them when she gets home and watch her BP at home. SBP goal<140.    4. Obesity, recommend lifestyle changes. She agrees but refuses health management program.    FU in clinic in 6 months with CW; will call with PET results    Patient verbalizes understanding and agrees with the plan of care.     Collaborating MD: Mauricio MORATAYA

## 2017-07-21 NOTE — Clinical Note
[facility]   [address], [address]  [place] [address]  Phone: [phone]  Fax: [phone]              Sania Viera  1945    Encounter Date: 7/21/2017    ONI Be          PROGRESS NOTE:  Subjective:   Sania Viera is a 71 y.o. female who presents today for follow up for recurrent chest discomfort and LE swelling.    She is a patient of Dr. Pagan in our office. Hx of HTN, HLD, CAD with IVANIA to OM, and obesity.    She states she has been having some intermittent chest pain and shortness of breath with exertion. She also has more leg swelling. She is wearing her compression stockings today, but it is still pitting.    She is having a breast biopsy soon. We received her previous angiogram from '06.     She had no episodes of palpitations or dizziness/lightheadedness.    Past Medical History   Diagnosis Date   • Essential hypertension    • Dyslipidemia    • CAD (coronary artery disease) 2005     IVANIA to OM   • Obesity      Past Surgical History   Procedure Laterality Date   • Cardiac cath     • Angioplasty  5/2006     RCA IVANIA in [facility]     Family History   Problem Relation Age of Onset   • Clotting Disorder Mother    • Heart Attack Father    • Heart Disease Brother    • Diabetes Brother      History   Smoking status   • Former Smoker   Smokeless tobacco   • Not on file     Allergies   Allergen Reactions   • Iodine Rash and Itching     She reports rash and severe significant itching after her cardiac catheterization in 2006, unclear if this was the topical scrub or related to IV contrast, for these reasons she does avoid shellfish     Outpatient Encounter Prescriptions as of 7/21/2017   Medication Sig Dispense Refill   • vitamin D (CHOLECALCIFEROL) 1000 UNIT Tab Take 1,000 Units by mouth every day.     • Omega 3 1000 MG Cap Take  by mouth.     • atorvastatin (LIPITOR) 20 MG Tab Take 1 Tab by mouth every day. 90 Tab 3   • losartan (COZAAR)  "100 MG Tab Take 1 Tab by mouth every day. 90 Tab 3   • metoprolol SR (TOPROL XL) 50 MG TABLET SR 24 HR Take 1 Tab by mouth every day. 90 Tab 3   • aspirin (ASA) 81 MG Chew Tab chewable tablet Take 1 Tab by mouth every day. 100 Tab      No facility-administered encounter medications on file as of 7/21/2017.     Review of Systems   Constitutional: Positive for malaise/fatigue. Negative for fever.   Respiratory: Positive for shortness of breath. Negative for cough.    Cardiovascular: Positive for chest pain and leg swelling. Negative for palpitations, orthopnea, claudication and PND.   Gastrointestinal: Negative for abdominal pain.   Musculoskeletal: Negative for myalgias.   Neurological: Negative for dizziness.   Psychiatric/Behavioral: The patient has insomnia.    All other systems reviewed and are negative.       Objective:   /90 mmHg  Pulse 68  Ht 1.702 m (5' 7.01\")  Wt 108.863 kg (240 lb)  BMI 37.58 kg/m2  SpO2 96%    Physical Exam   Constitutional: She is oriented to person, place, and time. She appears well-developed. No distress.   obese   HENT:   Head: Normocephalic and atraumatic.   Eyes: EOM are normal.   Neck: Normal range of motion. No JVD present.   Cardiovascular: Normal rate, regular rhythm, normal heart sounds and intact distal pulses.    No murmur heard.  Pulmonary/Chest: Effort normal and breath sounds normal. No respiratory distress.   Abdominal: Soft. Bowel sounds are normal.   Musculoskeletal: Normal range of motion. She exhibits edema.   Bilateral LE edema 1+ pitting   Neurological: She is alert and oriented to person, place, and time.   Skin: Skin is warm and dry.   Psychiatric: She has a normal mood and affect.   Nursing note and vitals reviewed.    Lab Results   Component Value Date/Time    CHOLESTEROL, 03/02/2017 10:20 AM    LDL 75 03/02/2017 10:20 AM    HDL 39* 03/02/2017 10:20 AM    TRIGLYCERIDES 170* 03/02/2017 10:20 AM       Lab Results   Component Value Date/Time    " SODIUM 145 07/05/2017 11:30 AM    POTASSIUM 4.0 07/05/2017 11:30 AM    CHLORIDE 106 07/05/2017 11:30 AM    CO2 30 07/05/2017 11:30 AM    GLUCOSE 109* 07/05/2017 11:30 AM    BUN 13 07/05/2017 11:30 AM    CREATININE 0.87 07/05/2017 11:30 AM     Lab Results   Component Value Date/Time    ALKALINE PHOSPHATASE 63 07/05/2017 11:30 AM    AST(SGOT) 39 07/05/2017 11:30 AM    ALT(SGPT) 31 07/05/2017 11:30 AM    TOTAL BILIRUBIN 0.5 07/05/2017 11:30 AM      Assessment:     1. Coronary artery disease due to lipid rich plaque  NM-CARDIAC PET   2. Dyslipidemia  NM-CARDIAC PET   3. Essential hypertension  NM-CARDIAC PET   4. Presence of drug coated stent in right coronary artery  NM-CARDIAC PET   5. Obesity, unspecified obesity severity, unspecified obesity type  NM-CARDIAC PET     Medical Decision Making:  Today's Assessment / Status / Plan:     1. CAD with previous IVANIA to OM, mild-mod residual disease in RCA and LAD. Continue ASA, metoprolol, and lipitor. New angina and LOVETT- check PET for review. Last stress echo in '14 was negative but with new onset of symptoms alongside upcoming surgery-would recommend stress testing, she is agreeable.    2. HLD, good labs. LDL goal <70, at 75 with last labs. Follow yearly. Statin.    3. HTN, usually good control. She didn't take her pills this morning. She will take them when she gets home and watch her BP at home. SBP goal<140.    4. Obesity, recommend lifestyle changes. She agrees but refuses health management program.    FU in clinic in 6 months with CW; will call with PET results    Patient verbalizes understanding and agrees with the plan of care.     Collaborating MD: To MD        No Recipients

## 2018-01-17 ENCOUNTER — OFFICE VISIT (OUTPATIENT)
Dept: CARDIOLOGY | Facility: MEDICAL CENTER | Age: 73
End: 2018-01-17
Payer: MEDICARE

## 2018-01-17 VITALS
SYSTOLIC BLOOD PRESSURE: 180 MMHG | WEIGHT: 246 LBS | BODY MASS INDEX: 38.61 KG/M2 | DIASTOLIC BLOOD PRESSURE: 100 MMHG | HEART RATE: 64 BPM | HEIGHT: 67 IN | OXYGEN SATURATION: 91 %

## 2018-01-17 DIAGNOSIS — I25.83 CORONARY ARTERY DISEASE DUE TO LIPID RICH PLAQUE: Chronic | ICD-10-CM

## 2018-01-17 DIAGNOSIS — I25.810 CORONARY ARTERY DISEASE INVOLVING OTHER CORONARY ARTERY BYPASS GRAFT WITHOUT ANGINA PECTORIS: ICD-10-CM

## 2018-01-17 DIAGNOSIS — I10 ESSENTIAL HYPERTENSION: Chronic | ICD-10-CM

## 2018-01-17 DIAGNOSIS — Z95.5 PRESENCE OF DRUG COATED STENT IN RIGHT CORONARY ARTERY: Chronic | ICD-10-CM

## 2018-01-17 DIAGNOSIS — E78.5 DYSLIPIDEMIA: Chronic | ICD-10-CM

## 2018-01-17 DIAGNOSIS — I25.10 CORONARY ARTERY DISEASE DUE TO LIPID RICH PLAQUE: Chronic | ICD-10-CM

## 2018-01-17 PROCEDURE — 99214 OFFICE O/P EST MOD 30 MIN: CPT | Performed by: INTERNAL MEDICINE

## 2018-01-17 RX ORDER — NEBIVOLOL 20 MG/1
20 TABLET ORAL DAILY
Qty: 90 TAB | Refills: 3 | Status: SHIPPED | OUTPATIENT
Start: 2018-01-17 | End: 2018-02-23

## 2018-01-17 RX ORDER — ATORVASTATIN CALCIUM 20 MG/1
20 TABLET, FILM COATED ORAL DAILY
Qty: 90 TAB | Refills: 3 | Status: SHIPPED | OUTPATIENT
Start: 2018-01-17 | End: 2021-04-05

## 2018-01-17 RX ORDER — AMLODIPINE BESYLATE 5 MG/1
5 TABLET ORAL DAILY
Qty: 90 TAB | Refills: 3 | Status: SHIPPED | OUTPATIENT
Start: 2018-01-17 | End: 2021-04-05

## 2018-01-17 RX ORDER — LOSARTAN POTASSIUM 100 MG/1
100 TABLET ORAL DAILY
Qty: 90 TAB | Refills: 3 | Status: SHIPPED | OUTPATIENT
Start: 2018-01-17 | End: 2021-04-05

## 2018-01-17 NOTE — LETTER
PROCEDURE/SURGERY CLEARANCE FORM      Encounter Date: 1/17/2018    Patient: Sania Viera  YOB: 1945    CARDIOLOGIST:  Yury Pagan M.D.    REFERRING DOCTOR:  Dr Giron      The above patient is cleared to have the following procedure/surgery: breast biopsy                                           Additional comments: She has high blood pressure which is often above goal, we are optimizing this but in the interim her blood pressure can safely be controlled in the perioperative setting with intravenous or additional oral medication.    It is my pleasure to participate in the care of Ms. Viera.  Please do not hesitate to contact me with questions or concerns. [facility] is available 24/7 for consultative services at [phone] in the perioperative period.    Electronically Signed    Yury Pagan MD PhD [facility]  Cardiologist [facility]

## 2018-01-17 NOTE — LETTER
[facility]   [address], [address]  [place] [address]  Phone: [phone]  Fax: [phone]              Sania Viera  1945    Encounter Date: 1/17/2018    Yury Pagan M.D.          PROGRESS NOTE:  Subjective:   Sania Viera is a 72 y.o. female who presents today for follow-up of her history of coronary disease status post stenting in 2005 hypertension    She's been doing well she finds her most recent blood pressure medicines work well with her    Past Medical History:   Diagnosis Date   • CAD (coronary artery disease) 2005    IVANIA to OM   • Dyslipidemia    • Essential hypertension    • Obesity      Past Surgical History:   Procedure Laterality Date   • ANGIOPLASTY  5/2006    OM IVANIA in [place]     Family History   Problem Relation Age of Onset   • Clotting Disorder Mother    • Heart Attack Father    • Heart Disease Brother    • Diabetes Brother      History   Smoking Status   • Former Smoker   Smokeless Tobacco   • Never Used     Allergies   Allergen Reactions   • Iodine Rash and Itching     She reports rash and severe significant itching after her cardiac catheterization in 2006, unclear if this was the topical scrub or related to IV contrast, for these reasons she does avoid shellfish     Outpatient Encounter Prescriptions as of 1/17/2018   Medication Sig Dispense Refill   • losartan (COZAAR) 100 MG Tab Take 1 Tab by mouth every day. 90 Tab 3   • Nebivolol HCl 20 MG Tab Take 20 mg by mouth every day. 90 Tab 3   • amlodipine (NORVASC) 5 MG Tab Take 1 Tab by mouth every day. 90 Tab 3   • atorvastatin (LIPITOR) 20 MG Tab Take 1 Tab by mouth every day. 90 Tab 3   • vitamin D (CHOLECALCIFEROL) 1000 UNIT Tab Take 1,000 Units by mouth every day.     • Omega 3 1000 MG Cap Take  by mouth.     • aspirin (ASA) 81 MG Chew Tab chewable tablet Take 1 Tab by mouth every day. 100 Tab    • [DISCONTINUED] atorvastatin (LIPITOR) 20 MG Tab Take 1 Tab by mouth every  "day. 90 Tab 3   • [DISCONTINUED] losartan (COZAAR) 100 MG Tab Take 1 Tab by mouth every day. 90 Tab 3   • [DISCONTINUED] metoprolol SR (TOPROL XL) 50 MG TABLET SR 24 HR Take 1 Tab by mouth every day. 90 Tab 3     No facility-administered encounter medications on file as of 1/17/2018.      Review of Systems   Constitutional: Negative for chills and fever.   HENT: Negative for sore throat.    Eyes: Negative for blurred vision.   Respiratory: Negative for cough and shortness of breath.    Cardiovascular: Negative for chest pain, palpitations, claudication, leg swelling and PND.   Gastrointestinal: Negative for abdominal pain and nausea.   Musculoskeletal: Negative for falls and joint pain.   Skin: Negative for rash.   Neurological: Negative for dizziness, focal weakness and weakness.   Endo/Heme/Allergies: Does not bruise/bleed easily.        Objective:   BP (!) 180/100   Pulse 64   Ht 1.702 m (5' 7.01\")   Wt 111.6 kg (246 lb)   SpO2 91%   BMI 38.52 kg/m²      Physical Exam   Constitutional: No distress.   HENT:   Mouth/Throat: Oropharynx is clear and moist.   Eyes: No scleral icterus.   Cardiovascular: Normal rate, regular rhythm and intact distal pulses.  Exam reveals no gallop and no friction rub.    No murmur heard.  Pulmonary/Chest: Effort normal and breath sounds normal. She has no rales.   Abdominal: Bowel sounds are normal. There is no tenderness.   Musculoskeletal: She exhibits no edema.   Neurological: She is alert.   Skin: No rash noted. She is not diaphoretic.   Psychiatric: She has a normal mood and affect.     Labs last July are within acceptable range A1c was borderline at 6    Assessment:     1. Presence of drug coated stent in right coronary artery     2. Coronary artery disease due to lipid rich plaque  atorvastatin (LIPITOR) 20 MG Tab   3. Essential hypertension  losartan (COZAAR) 100 MG Tab   4. Dyslipidemia     5. Coronary artery disease involving other coronary artery bypass graft without " angina pectoris  atorvastatin (LIPITOR) 20 MG Tab       Medical Decision Making:  Today's Assessment / Status / Plan:     It was my pleasure to meet with Ms. Viera.    She is doing well on her current regimen blood pressure is well controlled typically today was elevated off of her meds    Weight overall has been stable we discussed meaningful weight loss    I will see Ms. Viera back in 1 year time and encouraged her to follow up with us over the phone or e-mail using my MyChart as issues arise.    It is my pleasure to participate in the care of Ms. Viera.  Please do not hesitate to contact me with questions or concerns.    Yury Pagan MD PhD FAC  Cardiologist [facility]        Yury Virk M.D.  [facility] [address] [place] [id number]  VIA Facsimile: [phone]

## 2018-01-29 ASSESSMENT — ENCOUNTER SYMPTOMS
BLURRED VISION: 0
DIZZINESS: 0
PALPITATIONS: 0
ABDOMINAL PAIN: 0
NAUSEA: 0
FEVER: 0
PND: 0
FOCAL WEAKNESS: 0
BRUISES/BLEEDS EASILY: 0
WEAKNESS: 0
CLAUDICATION: 0
CHILLS: 0
COUGH: 0
SORE THROAT: 0
FALLS: 0
SHORTNESS OF BREATH: 0

## 2018-01-29 NOTE — PROGRESS NOTES
Subjective:   Sania Viera is a 72 y.o. female who presents today for follow-up of her history of coronary disease status post stenting in 2005 hypertension    She's been doing well she finds her most recent blood pressure medicines work well with her    Past Medical History:   Diagnosis Date   • CAD (coronary artery disease) 2005    IVANIA to OM   • Dyslipidemia    • Essential hypertension    • Obesity      Past Surgical History:   Procedure Laterality Date   • ANGIOPLASTY  5/2006    OM IVANIA in [place]     Family History   Problem Relation Age of Onset   • Clotting Disorder Mother    • Heart Attack Father    • Heart Disease Brother    • Diabetes Brother      History   Smoking Status   • Former Smoker   Smokeless Tobacco   • Never Used     Allergies   Allergen Reactions   • Iodine Rash and Itching     She reports rash and severe significant itching after her cardiac catheterization in 2006, unclear if this was the topical scrub or related to IV contrast, for these reasons she does avoid shellfish     Outpatient Encounter Prescriptions as of 1/17/2018   Medication Sig Dispense Refill   • losartan (COZAAR) 100 MG Tab Take 1 Tab by mouth every day. 90 Tab 3   • Nebivolol HCl 20 MG Tab Take 20 mg by mouth every day. 90 Tab 3   • amlodipine (NORVASC) 5 MG Tab Take 1 Tab by mouth every day. 90 Tab 3   • atorvastatin (LIPITOR) 20 MG Tab Take 1 Tab by mouth every day. 90 Tab 3   • vitamin D (CHOLECALCIFEROL) 1000 UNIT Tab Take 1,000 Units by mouth every day.     • Omega 3 1000 MG Cap Take  by mouth.     • aspirin (ASA) 81 MG Chew Tab chewable tablet Take 1 Tab by mouth every day. 100 Tab    • [DISCONTINUED] atorvastatin (LIPITOR) 20 MG Tab Take 1 Tab by mouth every day. 90 Tab 3   • [DISCONTINUED] losartan (COZAAR) 100 MG Tab Take 1 Tab by mouth every day. 90 Tab 3   • [DISCONTINUED] metoprolol SR (TOPROL XL) 50 MG TABLET SR 24 HR Take 1 Tab by mouth every day. 90 Tab 3     No facility-administered encounter medications on  "file as of 1/17/2018.      Review of Systems   Constitutional: Negative for chills and fever.   HENT: Negative for sore throat.    Eyes: Negative for blurred vision.   Respiratory: Negative for cough and shortness of breath.    Cardiovascular: Negative for chest pain, palpitations, claudication, leg swelling and PND.   Gastrointestinal: Negative for abdominal pain and nausea.   Musculoskeletal: Negative for falls and joint pain.   Skin: Negative for rash.   Neurological: Negative for dizziness, focal weakness and weakness.   Endo/Heme/Allergies: Does not bruise/bleed easily.        Objective:   BP (!) 180/100   Pulse 64   Ht 1.702 m (5' 7.01\")   Wt 111.6 kg (246 lb)   SpO2 91%   BMI 38.52 kg/m²     Physical Exam   Constitutional: No distress.   HENT:   Mouth/Throat: Oropharynx is clear and moist.   Eyes: No scleral icterus.   Cardiovascular: Normal rate, regular rhythm and intact distal pulses.  Exam reveals no gallop and no friction rub.    No murmur heard.  Pulmonary/Chest: Effort normal and breath sounds normal. She has no rales.   Abdominal: Bowel sounds are normal. There is no tenderness.   Musculoskeletal: She exhibits no edema.   Neurological: She is alert.   Skin: No rash noted. She is not diaphoretic.   Psychiatric: She has a normal mood and affect.     Labs last July are within acceptable range A1c was borderline at 6    Assessment:     1. Presence of drug coated stent in right coronary artery     2. Coronary artery disease due to lipid rich plaque  atorvastatin (LIPITOR) 20 MG Tab   3. Essential hypertension  losartan (COZAAR) 100 MG Tab   4. Dyslipidemia     5. Coronary artery disease involving other coronary artery bypass graft without angina pectoris  atorvastatin (LIPITOR) 20 MG Tab       Medical Decision Making:  Today's Assessment / Status / Plan:     It was my pleasure to meet with Ms. Viera.    She is doing well on her current regimen blood pressure is well controlled typically today was " elevated off of her meds    Weight overall has been stable we discussed meaningful weight loss    I will see Ms. Viera back in 1 year time and encouraged her to follow up with us over the phone or e-mail using my MyChart as issues arise.    It is my pleasure to participate in the care of Ms. Viera.  Please do not hesitate to contact me with questions or concerns.    Yury Pagan MD PhD FAC  Cardiologist [facility]

## 2018-02-23 ENCOUNTER — APPOINTMENT (OUTPATIENT)
Dept: ADMISSIONS | Facility: MEDICAL CENTER | Age: 73
End: 2018-02-23
Attending: SURGERY
Payer: MEDICARE

## 2018-03-01 ENCOUNTER — APPOINTMENT (OUTPATIENT)
Dept: RADIOLOGY | Facility: MEDICAL CENTER | Age: 73
End: 2018-03-01
Attending: SURGERY
Payer: MEDICARE

## 2018-03-01 ENCOUNTER — HOSPITAL ENCOUNTER (OUTPATIENT)
Facility: MEDICAL CENTER | Age: 73
End: 2018-03-01
Attending: SURGERY | Admitting: SURGERY
Payer: MEDICARE

## 2018-03-01 VITALS
HEIGHT: 68 IN | OXYGEN SATURATION: 97 % | WEIGHT: 245.59 LBS | SYSTOLIC BLOOD PRESSURE: 173 MMHG | BODY MASS INDEX: 37.22 KG/M2 | DIASTOLIC BLOOD PRESSURE: 76 MMHG | RESPIRATION RATE: 19 BRPM | HEART RATE: 73 BPM | TEMPERATURE: 97.4 F

## 2018-03-01 DIAGNOSIS — N63.0 LUMP OR MASS IN BREAST: ICD-10-CM

## 2018-03-01 DIAGNOSIS — R92.8 ABNORMAL MAMMOGRAM: ICD-10-CM

## 2018-03-01 LAB
ANION GAP SERPL CALC-SCNC: 9 MMOL/L (ref 0–11.9)
BUN SERPL-MCNC: 13 MG/DL (ref 8–22)
CALCIUM SERPL-MCNC: 9.1 MG/DL (ref 8.5–10.5)
CHLORIDE SERPL-SCNC: 107 MMOL/L (ref 96–112)
CO2 SERPL-SCNC: 26 MMOL/L (ref 20–33)
CREAT SERPL-MCNC: 0.79 MG/DL (ref 0.5–1.4)
EKG IMPRESSION: NORMAL
ERYTHROCYTE [DISTWIDTH] IN BLOOD BY AUTOMATED COUNT: 45.1 FL (ref 35.9–50)
GLUCOSE SERPL-MCNC: 98 MG/DL (ref 65–99)
HCT VFR BLD AUTO: 46.6 % (ref 37–47)
HGB BLD-MCNC: 15 G/DL (ref 12–16)
MCH RBC QN AUTO: 30.2 PG (ref 27–33)
MCHC RBC AUTO-ENTMCNC: 32.2 G/DL (ref 33.6–35)
MCV RBC AUTO: 94 FL (ref 81.4–97.8)
PLATELET # BLD AUTO: 259 K/UL (ref 164–446)
PMV BLD AUTO: 9.8 FL (ref 9–12.9)
POTASSIUM SERPL-SCNC: 3.4 MMOL/L (ref 3.6–5.5)
RBC # BLD AUTO: 4.96 M/UL (ref 4.2–5.4)
SODIUM SERPL-SCNC: 142 MMOL/L (ref 135–145)
WBC # BLD AUTO: 8.4 K/UL (ref 4.8–10.8)

## 2018-03-01 PROCEDURE — 93010 ELECTROCARDIOGRAM REPORT: CPT | Performed by: INTERNAL MEDICINE

## 2018-03-01 PROCEDURE — 700111 HCHG RX REV CODE 636 W/ 250 OVERRIDE (IP)

## 2018-03-01 PROCEDURE — 160025 RECOVERY II MINUTES (STATS): Performed by: SURGERY

## 2018-03-01 PROCEDURE — 88307 TISSUE EXAM BY PATHOLOGIST: CPT

## 2018-03-01 PROCEDURE — A6402 STERILE GAUZE <= 16 SQ IN: HCPCS | Performed by: SURGERY

## 2018-03-01 PROCEDURE — 700102 HCHG RX REV CODE 250 W/ 637 OVERRIDE(OP)

## 2018-03-01 PROCEDURE — 500122 HCHG BOVIE, BLADE: Performed by: SURGERY

## 2018-03-01 PROCEDURE — 160046 HCHG PACU - 1ST 60 MINS PHASE II: Performed by: SURGERY

## 2018-03-01 PROCEDURE — 19286 PERQ DEV BREAST ADD US IMAG: CPT

## 2018-03-01 PROCEDURE — 88360 TUMOR IMMUNOHISTOCHEM/MANUAL: CPT | Mod: 91

## 2018-03-01 PROCEDURE — 85027 COMPLETE CBC AUTOMATED: CPT

## 2018-03-01 PROCEDURE — 501838 HCHG SUTURE GENERAL: Performed by: SURGERY

## 2018-03-01 PROCEDURE — 80048 BASIC METABOLIC PNL TOTAL CA: CPT

## 2018-03-01 PROCEDURE — 93005 ELECTROCARDIOGRAM TRACING: CPT | Performed by: SURGERY

## 2018-03-01 PROCEDURE — 160009 HCHG ANES TIME/MIN: Performed by: SURGERY

## 2018-03-01 PROCEDURE — 160002 HCHG RECOVERY MINUTES (STAT): Performed by: SURGERY

## 2018-03-01 PROCEDURE — 19285 PERQ DEV BREAST 1ST US IMAG: CPT | Mod: RT

## 2018-03-01 PROCEDURE — 160041 HCHG SURGERY MINUTES - EA ADDL 1 MIN LEVEL 4: Performed by: SURGERY

## 2018-03-01 PROCEDURE — 160029 HCHG SURGERY MINUTES - 1ST 30 MINS LEVEL 4: Performed by: SURGERY

## 2018-03-01 PROCEDURE — 700101 HCHG RX REV CODE 250

## 2018-03-01 PROCEDURE — 76098 X-RAY EXAM SURGICAL SPECIMEN: CPT | Mod: RT

## 2018-03-01 PROCEDURE — A9270 NON-COVERED ITEM OR SERVICE: HCPCS

## 2018-03-01 PROCEDURE — 160035 HCHG PACU - 1ST 60 MINS PHASE I: Performed by: SURGERY

## 2018-03-01 PROCEDURE — 160048 HCHG OR STATISTICAL LEVEL 1-5: Performed by: SURGERY

## 2018-03-01 RX ORDER — SODIUM CHLORIDE, SODIUM LACTATE, POTASSIUM CHLORIDE, CALCIUM CHLORIDE 600; 310; 30; 20 MG/100ML; MG/100ML; MG/100ML; MG/100ML
INJECTION, SOLUTION INTRAVENOUS CONTINUOUS
Status: DISCONTINUED | OUTPATIENT
Start: 2018-03-01 | End: 2018-03-01 | Stop reason: HOSPADM

## 2018-03-01 RX ORDER — ALPRAZOLAM 0.25 MG/1
TABLET ORAL
Status: COMPLETED
Start: 2018-03-01 | End: 2018-03-01

## 2018-03-01 RX ORDER — LIDOCAINE HYDROCHLORIDE 10 MG/ML
0.5 INJECTION, SOLUTION INFILTRATION; PERINEURAL
Status: DISCONTINUED | OUTPATIENT
Start: 2018-03-01 | End: 2018-03-01 | Stop reason: HOSPADM

## 2018-03-01 RX ORDER — OXYCODONE HCL 5 MG/5 ML
SOLUTION, ORAL ORAL
Status: COMPLETED
Start: 2018-03-01 | End: 2018-03-01

## 2018-03-01 RX ORDER — HYDROCODONE BITARTRATE AND ACETAMINOPHEN 5; 325 MG/1; MG/1
1-2 TABLET ORAL EVERY 4 HOURS PRN
Status: DISCONTINUED | OUTPATIENT
Start: 2018-03-01 | End: 2018-03-01 | Stop reason: HOSPADM

## 2018-03-01 RX ORDER — LIDOCAINE AND PRILOCAINE 25; 25 MG/G; MG/G
CREAM TOPICAL
Status: COMPLETED
Start: 2018-03-01 | End: 2018-03-01

## 2018-03-01 RX ORDER — SODIUM CHLORIDE AND POTASSIUM CHLORIDE 150; 900 MG/100ML; MG/100ML
INJECTION, SOLUTION INTRAVENOUS CONTINUOUS
Status: DISCONTINUED | OUTPATIENT
Start: 2018-03-01 | End: 2018-03-01 | Stop reason: HOSPADM

## 2018-03-01 RX ORDER — BUPIVACAINE HYDROCHLORIDE 2.5 MG/ML
INJECTION, SOLUTION EPIDURAL; INFILTRATION; INTRACAUDAL
Status: DISCONTINUED | OUTPATIENT
Start: 2018-03-01 | End: 2018-03-01 | Stop reason: HOSPADM

## 2018-03-01 RX ORDER — LIDOCAINE AND PRILOCAINE 25; 25 MG/G; MG/G
1 CREAM TOPICAL
Status: DISCONTINUED | OUTPATIENT
Start: 2018-03-01 | End: 2018-03-01 | Stop reason: HOSPADM

## 2018-03-01 RX ADMIN — OXYCODONE HYDROCHLORIDE 5 MG: 5 SOLUTION ORAL at 11:14

## 2018-03-01 RX ADMIN — LIDOCAINE AND PRILOCAINE 1 TUBE: 25; 25 CREAM TOPICAL at 07:30

## 2018-03-01 RX ADMIN — SODIUM CHLORIDE, SODIUM LACTATE, POTASSIUM CHLORIDE, CALCIUM CHLORIDE: 600; 310; 30; 20 INJECTION, SOLUTION INTRAVENOUS at 07:30

## 2018-03-01 RX ADMIN — ALPRAZOLAM 0.25 MG: 0.25 TABLET ORAL at 07:30

## 2018-03-01 ASSESSMENT — PAIN SCALES - GENERAL
PAINLEVEL_OUTOF10: 0
PAINLEVEL_OUTOF10: 0

## 2018-03-01 ASSESSMENT — PAIN SCALES - WONG BAKER
WONGBAKER_NUMERICALRESPONSE: DOESN'T HURT AT ALL
WONGBAKER_NUMERICALRESPONSE: HURTS JUST A LITTLE BIT

## 2018-03-01 NOTE — OP REPORT
DATE OF SERVICE:  03/01/2018    PREOPERATIVE DIAGNOSIS:  Two right breast masses of unknown malignant   potential.    POSTOPERATIVE DIAGNOSIS:  Two right breast masses of unknown malignant   potential.    PROCEDURE:  Right breast biopsy after needle localization.    SURGEON:  Ana Reyes MD    ASSISTANT:  REKHA Wilkes    ANESTHESIA:  Laryngeal mask.    ANESTHESIOLOGIST:  Paul Mulligan MD    INDICATIONS:  The patient is a 72-year-old female who has _____ abnormalities   in her right breast in the retroareolar area.  She subsequently has had core   biopsies of these which were inconclusive.  She is now being brought at this   time for excisional biopsy with wire localization.    FINDINGS:  Specimen mammogram containing both the abnormalities.    DESCRIPTION OF PROCEDURE:  After the patient was identified and consented, she   was brought to the operating room and placed in supine position.  Patient   underwent laryngeal mask anesthetic clearance.  Patient's right breast was   prepped and draped in sterile fashion.  A curvilinear incision was made in the   circumareolar area using electrocautery.  The subcutaneous tissue was   dissected down to both wires.  The tissue around the wire was grasped and   widely excised with electrocautery.  An x-ray did demonstrate both clips to be   within the specimen and clinically the masses were palpable.  The wound was   irrigated and hemostasis secured.  It was closed with 3-0 Vicryl subcutaneous   layer and skin was closed with running 4-0 Vicryl in subcuticular fashion.    Op-Site dressing placed on the wound.  Patient was extubated and taken to   recovery in stable condition.  All sponge and needle counts were correct.       ____________________________________     ANA REYES MD    [facility] / [facility]    DD:  03/01/2018 10:48:29  DT:  03/01/2018 12:04:56    D#:  [id number]  Job#:  [id number]    cc: KELLY CERDA MD, Paul Mulligan MD

## 2018-03-01 NOTE — DISCHARGE INSTRUCTIONS
ACTIVITY: Rest and take it easy for the first 24 hours.  A responsible adult is recommended to remain with you during that time.  It is normal to feel sleepy.  We encourage you to not do anything that requires balance, judgment or coordination.    MILD FLU-LIKE SYMPTOMS ARE NORMAL. YOU MAY EXPERIENCE GENERALIZED MUSCLE ACHES, THROAT IRRITATION, HEADACHE AND/OR SOME NAUSEA.    FOR 24 HOURS DO NOT:  Drive, operate machinery or run household appliances.  Drink beer or alcoholic beverages.   Make important decisions or sign legal documents.    SPECIAL INSTRUCTIONS & GICAL DRESSING/BATHING:   Care of Your Incisions:   • Leave the bandages on for 48 hours. You can shower with the dressing on as it is waterproof.  Avoid getting lotions, powders or deodorant on the incision while it is healing.   • There is no need to re-bandage the incisions after the first 48 hours, but it may be more comfortable to tuck a gauze pad inside your bra for the first week. You can buy 4 x 4 gauze dressings at your pharmacy.   • You may have thin paper strips across the incision. These are called Steri-Strips. When they start to curl at the edges, you can peel them off. It is okay to shower with these on.   • Don't worry if the area under either incision feels firm or hard. This is normal and usually softens within a few months.     How to Manage Discomfort After Surgery:   • It is normal to have tenderness, discomfort or mild swelling at the site of the incisions.      You may also feel:   - Numbness at the incisions.   - Occasional shooting pains in the breast as you heal.     • You will be given a prescription for pain medication prior to being discharged from the hospital. If you are having pain, take the medication as directed by your physician and by the label directions. You should be comfortable and moving around. Most women use some prescription pain medication, though some need only over the counter pain medication, such as  ibuprofen (Motrin) or acetaminophen (Tylenol). Some women have little pain and take nothing at all. Whatever amount of pain you have, it is important to listen to your body and use the medication if needed during your recovery.     • Prescription pain medication may cause constipation. If you are having problems, use what you normally would or call your nurse for suggestions. It also helps to stay regular by including fiber in your diet (for example: bran or fruits and vegetables) and drink plenty of liquids (water, juice, etc.).     Activity:   • You may resume your normal routine, but avoid heavy lifting (over 10 pounds), pushing or pulling until your first post-operative visit, unless otherwise specified by your surgeon.   • Do not drive for at least 24-48 hours after surgery (unless otherwise directed by your surgeon), or while you are taking prescription pain medicine. Prescription pain medicine causes drowsiness (makes you sleepy) so you should avoid doing any tasks where you should be awake and alert (driving, operating machinery, etc).     When to Call Your Doctor/Nurse:   • If you have a fever greater than 102, increased pain, redness or warmth at the area around the incision, drainage from the incision or around the drain, or swelling of the arm.     You should have a follow up appointment about a week after surgery, call [phone] if you do not already have an appointment.     Office addresses:   [address] [place] [address]   [address] [place] [address] [address] [place] [address]    DIET: To avoid nausea, slowly advance diet as tolerated, avoiding spicy or greasy foods for the first day.  Add more substantial food to your diet according to your physician's instructions. INCREASE FLUIDS AND FIBER TO AVOID CONSTIPATION.    FOLLOW-UP APPOINTMENT:  A follow-up appointment should be arranged with your doctor; call to schedule.    You should CALL YOUR PHYSICIAN if you develop:  Fever greater than 101 degrees  F.  Pain not relieved by medication, or persistent nausea or vomiting.  Excessive bleeding (blood soaking through dressing) or unexpected drainage from the wound.  Extreme redness or swelling around the incision site, drainage of pus or foul smelling drainage.  Inability to urinate or empty your bladder within 8 hours.  Problems with breathing or chest pain.    You should call 911 if you develop problems with breathing or chest pain.  If you are unable to contact your doctor or surgical center, you should go to the nearest emergency room or urgent care center.  Physician's telephone #: [phone]    If any questions arise, call your doctor.  If your doctor is not available, please feel free to call the Surgical Center at [phone].  The Center is open Monday through Friday from 7AM to 7PM.  You can also call the [facility] HOTLINE open 24 hours/day, 7 days/week and speak to a nurse at [phone], or toll free at [phone].    A registered nurse may call you a few days after your surgery to see how you are doing after your procedure.    MEDICATIONS: Resume taking daily medication.  Take prescribed pain medication with food.  If no medication is prescribed, you may take non-aspirin pain medication if needed.  PAIN MEDICATION CAN BE VERY CONSTIPATING.  Take a stool softener or laxative such as senokot, pericolace, or milk of magnesia if needed.    Prescription given for *Norco**.  Last pain medication given at *11:14 AM**.    If your physician has prescribed pain medication that includes Acetaminophen (Tylenol), do not take additional Acetaminophen (Tylenol) while taking the prescribed medication.    Depression / Suicide Risk    As you are discharged from this [facility] facility, it is important to learn how to keep safe from harming yourself.    Recognize the warning signs:  · Abrupt changes in personality, positive or negative- including increase in energy   · Giving away possessions  · Change in  eating patterns- significant weight changes-  positive or negative  · Change in sleeping patterns- unable to sleep or sleeping all the time   · Unwillingness or inability to communicate  · Depression  · Unusual sadness, discouragement and loneliness  · Talk of wanting to die  · Neglect of personal appearance   · Rebelliousness- reckless behavior  · Withdrawal from people/activities they love  · Confusion- inability to concentrate     If you or a loved one observes any of these behaviors or has concerns about self-harm, here's what you can do:  · Talk about it- your feelings and reasons for harming yourself  · Remove any means that you might use to hurt yourself (examples: pills, rope, extension cords, firearm)  · Get professional help from the community (Mental Health, Substance Abuse, psychological counseling)  · Do not be alone:Call your Safe Contact- someone whom you trust who will be there for you.  · Call your local CRISIS HOTLINE 710-4499 or 167-184-6021  · Call your local Children's Mobile Crisis Response Team [place] (188) 995-3603 or www.[employer]  · Call the toll free National Suicide Prevention Hotlines   · National Suicide Prevention Lifeline 105-195-ITXP (8552)  · National Hope Line Network 800-SUICIDE (943-7201)

## 2018-03-01 NOTE — PROGRESS NOTES
The Medication Reconciliation process has been completed by interviewing the patient    Allergies have been reviewed  Antibiotic use in 30 days - none    Home [facility]

## 2018-03-08 ENCOUNTER — HOSPITAL ENCOUNTER (OUTPATIENT)
Facility: MEDICAL CENTER | Age: 73
End: 2018-03-08
Attending: SURGERY | Admitting: SURGERY
Payer: MEDICARE

## 2018-03-08 ENCOUNTER — HOSPITAL ENCOUNTER (OUTPATIENT)
Dept: RADIOLOGY | Facility: MEDICAL CENTER | Age: 73
End: 2018-03-08
Attending: SURGERY

## 2018-03-08 VITALS
BODY MASS INDEX: 36.65 KG/M2 | WEIGHT: 241.84 LBS | OXYGEN SATURATION: 97 % | RESPIRATION RATE: 16 BRPM | TEMPERATURE: 97.6 F | HEART RATE: 76 BPM | HEIGHT: 68 IN

## 2018-03-08 DIAGNOSIS — C50.911 MALIGNANT NEOPLASM OF RIGHT FEMALE BREAST, UNSPECIFIED ESTROGEN RECEPTOR STATUS, UNSPECIFIED SITE OF BREAST (HCC): ICD-10-CM

## 2018-03-08 PROCEDURE — 88307 TISSUE EXAM BY PATHOLOGIST: CPT

## 2018-03-08 PROCEDURE — 88342 IMHCHEM/IMCYTCHM 1ST ANTB: CPT

## 2018-03-08 PROCEDURE — 160041 HCHG SURGERY MINUTES - EA ADDL 1 MIN LEVEL 4: Performed by: SURGERY

## 2018-03-08 PROCEDURE — 700101 HCHG RX REV CODE 250

## 2018-03-08 PROCEDURE — 501838 HCHG SUTURE GENERAL: Performed by: SURGERY

## 2018-03-08 PROCEDURE — 88341 IMHCHEM/IMCYTCHM EA ADD ANTB: CPT

## 2018-03-08 PROCEDURE — 88331 PATH CONSLTJ SURG 1 BLK 1SPC: CPT | Mod: 91

## 2018-03-08 PROCEDURE — 160047 HCHG PACU  - EA ADDL 30 MINS PHASE II: Performed by: SURGERY

## 2018-03-08 PROCEDURE — A9270 NON-COVERED ITEM OR SERVICE: HCPCS

## 2018-03-08 PROCEDURE — 160036 HCHG PACU - EA ADDL 30 MINS PHASE I: Performed by: SURGERY

## 2018-03-08 PROCEDURE — 160009 HCHG ANES TIME/MIN: Performed by: SURGERY

## 2018-03-08 PROCEDURE — 160046 HCHG PACU - 1ST 60 MINS PHASE II: Performed by: SURGERY

## 2018-03-08 PROCEDURE — 88305 TISSUE EXAM BY PATHOLOGIST: CPT

## 2018-03-08 PROCEDURE — 160035 HCHG PACU - 1ST 60 MINS PHASE I: Performed by: SURGERY

## 2018-03-08 PROCEDURE — 700102 HCHG RX REV CODE 250 W/ 637 OVERRIDE(OP)

## 2018-03-08 PROCEDURE — 700111 HCHG RX REV CODE 636 W/ 250 OVERRIDE (IP)

## 2018-03-08 PROCEDURE — 160048 HCHG OR STATISTICAL LEVEL 1-5: Performed by: SURGERY

## 2018-03-08 PROCEDURE — 38792 RA TRACER ID OF SENTINL NODE: CPT | Mod: RT

## 2018-03-08 PROCEDURE — 502703 HCHG DEVICE, LIGASURE V SEALER: Performed by: SURGERY

## 2018-03-08 PROCEDURE — 500122 HCHG BOVIE, BLADE: Performed by: SURGERY

## 2018-03-08 PROCEDURE — 160029 HCHG SURGERY MINUTES - 1ST 30 MINS LEVEL 4: Performed by: SURGERY

## 2018-03-08 PROCEDURE — 160025 RECOVERY II MINUTES (STATS): Performed by: SURGERY

## 2018-03-08 PROCEDURE — 160002 HCHG RECOVERY MINUTES (STAT): Performed by: SURGERY

## 2018-03-08 RX ORDER — ALPRAZOLAM 0.25 MG/1
TABLET ORAL
Status: COMPLETED
Start: 2018-03-08 | End: 2018-03-08

## 2018-03-08 RX ORDER — OXYCODONE HCL 5 MG/5 ML
SOLUTION, ORAL ORAL
Status: COMPLETED
Start: 2018-03-08 | End: 2018-03-08

## 2018-03-08 RX ORDER — HYDROCODONE BITARTRATE AND ACETAMINOPHEN 5; 325 MG/1; MG/1
1-2 TABLET ORAL EVERY 4 HOURS PRN
Status: DISCONTINUED | OUTPATIENT
Start: 2018-03-08 | End: 2018-03-08 | Stop reason: HOSPADM

## 2018-03-08 RX ORDER — BUPIVACAINE HYDROCHLORIDE 2.5 MG/ML
INJECTION, SOLUTION EPIDURAL; INFILTRATION; INTRACAUDAL
Status: DISCONTINUED | OUTPATIENT
Start: 2018-03-08 | End: 2018-03-08 | Stop reason: HOSPADM

## 2018-03-08 RX ORDER — LIDOCAINE AND PRILOCAINE 25; 25 MG/G; MG/G
CREAM TOPICAL
Status: COMPLETED
Start: 2018-03-08 | End: 2018-03-08

## 2018-03-08 RX ORDER — BUPIVACAINE HYDROCHLORIDE 2.5 MG/ML
0.5 INJECTION, SOLUTION EPIDURAL; INFILTRATION; INTRACAUDAL
Status: DISCONTINUED | OUTPATIENT
Start: 2018-03-08 | End: 2018-03-08 | Stop reason: HOSPADM

## 2018-03-08 RX ORDER — LIDOCAINE HYDROCHLORIDE 10 MG/ML
INJECTION, SOLUTION INFILTRATION; PERINEURAL
Status: COMPLETED
Start: 2018-03-08 | End: 2018-03-08

## 2018-03-08 RX ORDER — SODIUM CHLORIDE AND POTASSIUM CHLORIDE 150; 900 MG/100ML; MG/100ML
INJECTION, SOLUTION INTRAVENOUS CONTINUOUS
Status: DISCONTINUED | OUTPATIENT
Start: 2018-03-08 | End: 2018-03-08 | Stop reason: HOSPADM

## 2018-03-08 RX ORDER — SODIUM CHLORIDE, SODIUM LACTATE, POTASSIUM CHLORIDE, CALCIUM CHLORIDE 600; 310; 30; 20 MG/100ML; MG/100ML; MG/100ML; MG/100ML
INJECTION, SOLUTION INTRAVENOUS CONTINUOUS
Status: DISCONTINUED | OUTPATIENT
Start: 2018-03-08 | End: 2018-03-08 | Stop reason: HOSPADM

## 2018-03-08 RX ORDER — LIDOCAINE AND PRILOCAINE 25; 25 MG/G; MG/G
1 CREAM TOPICAL
Status: DISCONTINUED | OUTPATIENT
Start: 2018-03-08 | End: 2018-03-08 | Stop reason: HOSPADM

## 2018-03-08 RX ADMIN — FENTANYL CITRATE 50 MCG: 50 INJECTION, SOLUTION INTRAMUSCULAR; INTRAVENOUS at 14:50

## 2018-03-08 RX ADMIN — FENTANYL CITRATE 50 MCG: 50 INJECTION, SOLUTION INTRAMUSCULAR; INTRAVENOUS at 15:36

## 2018-03-08 RX ADMIN — LIDOCAINE AND PRILOCAINE 1 APPLICATION: 25; 25 CREAM TOPICAL at 10:50

## 2018-03-08 RX ADMIN — SODIUM CHLORIDE, SODIUM LACTATE, POTASSIUM CHLORIDE, CALCIUM CHLORIDE: 600; 310; 30; 20 INJECTION, SOLUTION INTRAVENOUS at 13:30

## 2018-03-08 RX ADMIN — OXYCODONE HYDROCHLORIDE 10 MG: 5 SOLUTION ORAL at 14:55

## 2018-03-08 RX ADMIN — ALPRAZOLAM 0.25 MG: 0.25 TABLET ORAL at 10:55

## 2018-03-08 RX ADMIN — LIDOCAINE HYDROCHLORIDE 0.5 ML: 10 INJECTION, SOLUTION INFILTRATION; PERINEURAL at 11:15

## 2018-03-08 ASSESSMENT — PAIN SCALES - GENERAL
PAINLEVEL_OUTOF10: 8
PAINLEVEL_OUTOF10: 7
PAINLEVEL_OUTOF10: 7
PAINLEVEL_OUTOF10: 4
PAINLEVEL_OUTOF10: 0
PAINLEVEL_OUTOF10: 5
PAINLEVEL_OUTOF10: 8
PAINLEVEL_OUTOF10: 7

## 2018-03-08 NOTE — DISCHARGE INSTRUCTIONS
ACTIVITY: Rest and take it easy for the first 24 hours.  A responsible adult is recommended to remain with you during that time.  It is normal to feel sleepy.  We encourage you to not do anything that requires balance, judgment or coordination.    MILD FLU-LIKE SYMPTOMS ARE NORMAL. YOU MAY EXPERIENCE GENERALIZED MUSCLE ACHES, THROAT IRRITATION, HEADACHE AND/OR SOME NAUSEA.    FOR 24 HOURS DO NOT:  Drive, operate machinery or run household appliances.  Drink beer or alcoholic beverages.   Make important decisions or sign legal documents.    SPECIAL INSTRUCTIONS:    Discharge Instructions for Lumpectomy and Nova Lymph Node Biospy:    On the day of surgery we will be removing the lump of your breast cancer (lumpectomy) and through a separate incision under your arm we will be taking out the 2-3 lymph nodes that drain your breast (sentinel lymph node dissection).    Wound Care  You will have 2 incisions: 1) on your breast (lumpectomy) and 2) under your arm (sentinel lymph node biopsy).  All of your stitches are buried under the skin and dissolveable. There are no sutures to remove. Your dressing is waterproog.  You can shower the day after your surgery and get your wound wet (it will be sealed by the waterproof dressings)  You may have some bruising after surgery. This is normal.  There may be a small amount of drainage from your wounds, this is usually normal and nothing to worry about. Place a dry gauze or bandage (available at any drug store) on the wound to absorb any drainage.  You can remove the dressing 2 days after surgery.     For Pain  Wear your bra as much as possible including to bed. The less your breast moves the less it will hurt.  You may take Tylenol or Advil every 4-6 hours as directed on the bottle.  You will be given a prescription for more severe pain. You can use it as needed.     Work  If you would like, you may return to work the day after your biopsy.  If you need a work excuse for the day  of surgery or for any days thereafter, please let us know.    When to call the doctor  If you have severe, uncontrolled pain at the biopsy site.  If you run at fever of 100.5?F or higher within a few days of the biopsy.  If you have a large amount of drainage that is soaking the bandage more than once a day.  If the area around your wound becomes red/inflamed.  Make sure you schedule and attend all follow-up visits with your doctor. You should have a follow up appointment in about a week after surgery.    If you have any additional questions, please do not hesitate to call the office.     Office address:  [address] [place], [place] [address]e [facility], [address] [place] [address]    DIET: To avoid nausea, slowly advance diet as tolerated, avoiding spicy or greasy foods for the first day.  Add more substantial food to your diet according to your physician's instructions.  Babies can be fed formula or breast milk as soon as they are hungry.  INCREASE FLUIDS AND FIBER TO AVOID CONSTIPATION.    SURGICAL DRESSING/BATHING:  Remove dressings 3/10.    FOLLOW-UP APPOINTMENT:  A follow-up appointment should be arranged with your doctor in 3/14 call to schedule: # [phone]    You should CALL YOUR PHYSICIAN if you develop:  Fever greater than 101 degrees F.  Pain not relieved by medication, or persistent nausea or vomiting.  Excessive bleeding (blood soaking through dressing) or unexpected drainage from the wound.  Extreme redness or swelling around the incision site, drainage of pus or foul smelling drainage.  Inability to urinate or empty your bladder within 8 hours.  Problems with breathing or chest pain.    You should call 911 if you develop problems with breathing or chest pain.  If you are unable to contact your doctor or surgical center, you should go to the nearest emergency room or urgent care center.  Physician's telephone #: [phone]    If any questions arise, call your doctor.  If your doctor is not available, please feel free to call the  Surgical Center at [phone].  The Center is open Monday through Friday from 7AM to 7PM.  You can also call the HEALTH HOTLINE open 24 hours/day, 7 days/week and speak to a nurse at [phone], or toll free at [phone].    A registered nurse may call you a few days after your surgery to see how you are doing after your procedure.    MEDICATIONS: Resume taking daily medication.  Take prescribed pain medication with food.  If no medication is prescribed, you may take non-aspirin pain medication if needed.  PAIN MEDICATION CAN BE VERY CONSTIPATING.  Take a stool softener or laxative such as senokot, pericolace, or milk of magnesia if needed.    Prescription given prior to procedure.  Last pain medication given at 2:55pm.    If your physician has prescribed pain medication that includes Acetaminophen (Tylenol), do not take additional Acetaminophen (Tylenol) while taking the prescribed medication.    Depression / Suicide Risk    As you are discharged from this [facility] facility, it is important to learn how to keep safe from harming yourself.    Recognize the warning signs:  · Abrupt changes in personality, positive or negative- including increase in energy   · Giving away possessions  · Change in eating patterns- significant weight changes-  positive or negative  · Change in sleeping patterns- unable to sleep or sleeping all the time   · Unwillingness or inability to communicate  · Depression  · Unusual sadness, discouragement and loneliness  · Talk of wanting to die  · Neglect of personal appearance   · Rebelliousness- reckless behavior  · Withdrawal from people/activities they love  · Confusion- inability to concentrate     If you or a loved one observes any of these behaviors or has concerns about self-harm, here's what you can do:  · Talk about it- your feelings and reasons for harming yourself  · Remove any means that you might use to hurt yourself (examples: pills, rope, extension cords,  firearm)  · Get professional help from the community (Mental Health, Substance Abuse, psychological counseling)  · Do not be alone:Call your Safe Contact- someone whom you trust who will be there for you.  · Call your local CRISIS HOTLINE 588-1978 or 029-972-4230  · Call your local Children's Mobile Crisis Response Team [place] (510) 699-7699 or www.[employer]  · Call the toll free National Suicide Prevention Hotlines   · National Suicide Prevention Lifeline 842-344-HQIF (1255)  · National Hope Line Network 800-SUICIDE (421-3035)

## 2018-03-08 NOTE — OP REPORT
DATE OF SERVICE:  03/08/2018    PREOPERATIVE DIAGNOSIS:  Right breast cancer.    POSTOPERATIVE DIAGNOSIS:  Right breast cancer.    PROCEDURE PERFORMED:  Right partial mastectomy and sentinel lymph node biopsy.    SURGEON:  Ana Giron MD    ASSISTANT:  REKHA Wilkes    ANESTHESIA:  Laryngeal mask.    ANESTHESIOLOGIST:  Black Cheng MD    INDICATIONS:  The patient is a 72-year-old female who had a breast mass in her   right breast that was biopsied with a needle biopsy, which was unsuccessful.    Subsequently, she was brought to the operating room and had an excisional   biopsy, which demonstrated grade III malignancy or invasive ductal carcinoma   that had a positive anterior margin.  She is now being returned to the   operating room for a partial mastectomy with reexcision of the anterior margin   and sentinel node biopsy.    FINDINGS:  Specimens are the anterior margin was reexcised, there was no gross   tumor noted.  There were 2 sentinel lymph nodes that were identified and both   were negative.    DESCRIPTION OF PROCEDURE:  After the patient was identified and consented, she   was brought to the operating room and placed in supine position.  The patient   underwent laryngeal mask anesthetic clearance.  The patient's right breast   was prepped and draped in sterile fashion.  The navigator probe was used prior   to prepping to demonstrate that there was uptake in the axilla.  Attention   was first turned to the sentinel node biopsy.  A curvilinear incision was made   in the inferior hairline using electrocautery.  The subcutaneous tissue was   dissected down and the axillary fascia was opened.  Once that was completed,   the sentinel nodes were identified with the navigator probe.  They were    from the surrounding tissues using the LigaSure device, sent to   pathology for evaluation.  Cavity was irrigated and cavity was closed with 3-0   Vicryl subcutaneous layer and skin was closed with 4-0  Vicryl in a   subcuticular fashion.  Once that was completed, the previous biopsy site was   reopened using electrocautery, subcutaneous tissue was dissected down, the   anterior margin was reexcised entirely and sent to pathology for evaluation.    The cavity was irrigated and hemostasis secured.  It was closed with 3-0   Vicryl subcutaneous layer and skin was closed with running 4-0 Vicryl in a   subcuticular fashion.  Op-Site dressing placed on the wounds.  The patient was   extubated and taken to recovery in stable condition.  All sponge and needle   counts were correct.       ____________________________________     LILIA REYES MD    FM / NTS    DD:  03/08/2018 14:05:22  DT:  03/08/2018 14:16:58    D#:  [id number]  Job#:  [id number]    cc: YUNIER CHAMPAGNE MD

## 2018-03-09 NOTE — OR NURSING
Patient discharged home with friend. Discharge instructions reviewed and all questions answered. IV out. All belongings accounted for. VSS.

## 2018-03-30 ENCOUNTER — HOSPITAL ENCOUNTER (OUTPATIENT)
Dept: RADIATION ONCOLOGY | Facility: MEDICAL CENTER | Age: 73
End: 2018-03-31
Attending: RADIOLOGY
Payer: MEDICARE

## 2018-03-30 VITALS
SYSTOLIC BLOOD PRESSURE: 166 MMHG | RESPIRATION RATE: 16 BRPM | DIASTOLIC BLOOD PRESSURE: 83 MMHG | HEART RATE: 85 BPM | TEMPERATURE: 97.4 F | BODY MASS INDEX: 36.9 KG/M2 | HEIGHT: 68 IN | OXYGEN SATURATION: 92 % | WEIGHT: 243.5 LBS

## 2018-03-30 DIAGNOSIS — Z17.0 MALIGNANT NEOPLASM OF RIGHT BREAST IN FEMALE, ESTROGEN RECEPTOR POSITIVE, UNSPECIFIED SITE OF BREAST (HCC): ICD-10-CM

## 2018-03-30 DIAGNOSIS — C50.911 MALIGNANT NEOPLASM OF RIGHT BREAST IN FEMALE, ESTROGEN RECEPTOR POSITIVE, UNSPECIFIED SITE OF BREAST (HCC): ICD-10-CM

## 2018-03-30 PROCEDURE — 99214 OFFICE O/P EST MOD 30 MIN: CPT | Performed by: RADIOLOGY

## 2018-03-30 PROCEDURE — 99205 OFFICE O/P NEW HI 60 MIN: CPT | Performed by: RADIOLOGY

## 2018-03-30 ASSESSMENT — PAIN SCALES - GENERAL: PAINLEVEL: NO PAIN

## 2018-03-30 NOTE — CONSULTS
"RADIATION ONCOLOGY CONSULT    DATE OF SERVICE: 3/30/2018    IDENTIFICATION: A 72 y.o. female with T2N0 grade 3, Her 2 faisal triple positive, ER , PA 0, Ki-67 20% s/p lumpectomy, re-excision and negative sentinel node..  She is here at the kind request of Dr. Giron.      HISTORY OF PRESENT ILLNESS: Patient's history dates back to 4/18/2017 when she underwent a mammogram this revealed 2 adjacent masses 1 in the retroareolar region and one in the medial breast the 1 that was retro-areolar measured 1.9 x 1.8 x 1.5 and the one in the medial breast measured 2 x 2 x 1.7. She then underwent biopsies of both lesions on 9/21/2017 there were negative. Because of the discordance she was seen by Dr. Giron and ultimately underwent a biopsy 3/1/2018. Tumor was Her2 triple positive, grade 3ER % PA 0 6720%. Tumor was 3.2 x 2.2 infiltrating ductal carcinoma. 3/8/2018 she then underwent a reexcision because of close margins and there were 2 foci of residual high grade invasive ductal but were 0.5 mm away from the new kinked anterior margin over breath of less than 1 mm. 4 sentinel nodes were taken out which were negative. She seen in consultation about radiation therapy to decrease her chance of local recurrence. She plans to see Dr. 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   • Urinary incontinence     wears pads       PAST SURGICAL HISTORY:  Past Surgical History:   Procedure Laterality " Date   • MASTECTOMY Right 3/8/2018    Procedure: MASTECTOMY - PARTIAL;  Surgeon: Ana Giron M.D.;  Location: SURGERY [facility];  Service: General   • NODE BIOPSY SENTINEL Right 3/8/2018    Procedure: NODE BIOPSY SENTINEL - AXILLARY;  Surgeon: Ana Giron M.D.;  Location: SURGERY [facility];  Service: General   • BREAST BIOPSY Right 3/1/2018    Procedure: BREAST BIOPSY- EXCISION , MEDIAL LESION, EXCISION OF MASS, LATERAL  AFTER WIRE LOCALIZATION  ;  Surgeon: Ana Giron M.D.;  Location: SURGERY [facility];  Service: General   • ANGIOPLASTY  2006    OM [place] in [place]   • STENT PLACEMENT      MI   • OTHER      tonsillectomy       GYNECOLOGICAL STATUS:   P: 0  Never had a period      HORMONE USE:  BCP: None  HRT: None    CURRENT MEDICATIONS:  Current Outpatient Prescriptions   Medication Sig Dispense Refill   • losartan (COZAAR) 100 MG Tab Take 1 Tab by mouth every day. 90 Tab 3   • amlodipine (NORVASC) 5 MG Tab Take 1 Tab by mouth every day. 90 Tab 3   • atorvastatin (LIPITOR) 20 MG Tab Take 1 Tab by mouth every day. 90 Tab 3   • vitamin D (CHOLECALCIFEROL) 1000 UNIT Tab Take 1,000 Units by mouth every day.     • Omega 3 1000 MG Cap Take 1 Dose by mouth every day.     • aspirin (ASA) 81 MG Chew Tab chewable tablet Take 1 Tab by mouth every day. 100 Tab      No current facility-administered medications for this encounter.        ALLERGIES:    Iodine; Misc natural products; and Tape    FAMILY HISTORY:    Family History   Problem Relation Age of Onset   • Clotting Disorder Mother    • Heart Attack Father    • Heart Disease Brother    • Diabetes Brother    • Cancer Maternal Aunt    • Cancer Maternal Aunt    [unfilled]        SOCIAL HISTORY:     reports that she quit smoking about 28 years ago. Her smoking use included Cigarettes. She has a 12.50 pack-year smoking history. She has never used smokeless tobacco. She reports that she drinks alcohol. She reports that she does not  "use drugs.  Patient is retired  Lives alone in [place].    REVIEW OF SYSTEMS: Pertinent positives consist of urinary urgency and nocturia  The rest of the review of systems is negative and has been reviewed by me. It is in the nursing note dated 3/30/2018 in [facility]    PHYSICAL EXAM:    0= Fully active, able to carry on all pre-disease performance without restriction.  Vitals:    03/30/18 1025   BP: (!) 166/83   Pulse: 85   Resp: 16   Temp: 36.3 °C (97.4 °F)   SpO2: 92%   Weight: 110.5 kg (243 lb 8 oz)   Height: 1.727 m (5' 8\")   Pain Score: No pain        GENERAL: Well-appearing alert and oriented ×3 in no apparent distress  Breasts: The right breast has evidence of the recent surgery with a periareolar scar. She has the associated ecchymotic changes and induration in the lumpectomy site as well as in the axillary node dissection. No other masses are appreciated in either breast or axilla  HEENT:  Pupils are equal, round, and reactive to light.  Extraocular muscles   are intact. Sclerae nonicteric.  Conjunctivae pink.  Oral cavity, tongue   protrudes midline.   NECK:  Supple without evidence of thyromegaly.  NODES:  No peripheral adenopathy of the neck, supraclavicular fossa or axillae   bilaterally.  LUNGS:  Clear to ascultation   HEART:  Regular rate and rhythm.  No murmur appreciated  ABDOMEN:  Soft. No evidence of hepatosplenomegaly.  Positive bowel sounds. Obese  EXTREMITIES: Trace Edema.  NEUROLOGIC:  Cranial nerves II through XII were intact. Normal stance and gait motor and sensory grossly within normal limits                IMPRESSION:    A 72 y.o. with  T2N0 grade 3, Her 2 faisal triple positive, ER , IA 0, Ki-67 20% s/p lumpectomy, re-excision and negative sentinel node.      RECOMMENDATIONS:   I discussed with the patient that I would recommend radiation therapy to decrease her chance of local recurrence within the breast. I did tell her that there are studies in women over the age of 70 who sometimes do " not receive radiation therapy but these patients are not HER-2/faisal triple positive and high-grade. I am concerned about local recurrence. She understands she has to see Dr. Rubi regarding systemic management on Tuesday. If she does require chemotherapy she would receive that prior to radiation therapy. She will give us a call 1 status determined to schedule a simulation.  I've described the details of radiation along with the side effects both acute and chronic, including but not exclusive to fatigue, skin reaction, local soreness, swelling, delayed healing, scarring fibrosis discoloration. Ample time was allowed for questions, and patient understands.        Thank you for the opportunity to participate in her care.  If any questions or comments, please do not hesitate in calling.    Please note that this dictation was created using voice recognition software. I have made every reasonable attempt to correct obvious errors, but I expect that there are errors of grammar and possibly content that I did not discover before finalizing the note.

## 2018-03-30 NOTE — NON-PROVIDER
"Patient was seen today in clinic with Dr. Chan for consult.  Vitals signs and weight were obtained and pain assessment was completed.  Allergies and medications were reviewed with the patient.  Review of systems completed.     Vitals/Pain:  Vitals:    03/30/18 1025   BP: (!) 166/83   Pulse: 85   Resp: 16   Temp: 36.3 °C (97.4 °F)   SpO2: 92%   Weight: 110.5 kg (243 lb 8 oz)   Height: 1.727 m (5' 8\")   Pain Score: No pain        Allergies:   Iodine; Misc natural products; and Tape    Current Medications:    Current Outpatient Prescriptions:   •  losartan (COZAAR) 100 MG Tab, Take 1 Tab by mouth every day., Disp: 90 Tab, Rfl: 3  •  amlodipine (NORVASC) 5 MG Tab, Take 1 Tab by mouth every day., Disp: 90 Tab, Rfl: 3  •  atorvastatin (LIPITOR) 20 MG Tab, Take 1 Tab by mouth every day., Disp: 90 Tab, Rfl: 3  •  vitamin D (CHOLECALCIFEROL) 1000 UNIT Tab, Take 1,000 Units by mouth every day., Disp: , Rfl:   •  Omega 3 1000 MG Cap, Take 1 Dose by mouth every day., Disp: , Rfl:   •  aspirin (ASA) 81 MG Chew Tab chewable tablet, Take 1 Tab by mouth every day., Disp: 100 Tab, Rfl:       PCP:  Sully Restrepo R.N.   3/30/2018  10:36 AM    "

## 2018-04-06 ENCOUNTER — PATIENT OUTREACH (OUTPATIENT)
Dept: OTHER | Facility: MEDICAL CENTER | Age: 73
End: 2018-04-06

## 2018-04-24 ENCOUNTER — HOSPITAL ENCOUNTER (OUTPATIENT)
Dept: LAB | Facility: MEDICAL CENTER | Age: 73
End: 2018-04-24
Attending: INTERNAL MEDICINE
Payer: MEDICARE

## 2018-04-24 PROCEDURE — 82306 VITAMIN D 25 HYDROXY: CPT

## 2018-04-24 PROCEDURE — 80053 COMPREHEN METABOLIC PANEL: CPT

## 2018-04-25 ENCOUNTER — PATIENT OUTREACH (OUTPATIENT)
Dept: OTHER | Facility: MEDICAL CENTER | Age: 73
End: 2018-04-25

## 2018-04-25 LAB
25(OH)D3 SERPL-MCNC: 17 NG/ML (ref 30–100)
ALBUMIN SERPL BCP-MCNC: 3.8 G/DL (ref 3.2–4.9)
ALBUMIN/GLOB SERPL: 1.1 G/DL
ALP SERPL-CCNC: 75 U/L (ref 30–99)
ALT SERPL-CCNC: 43 U/L (ref 2–50)
AMBIGUOUS DTTM AMBI4: NORMAL
ANION GAP SERPL CALC-SCNC: 6 MMOL/L (ref 0–11.9)
AST SERPL-CCNC: 61 U/L (ref 12–45)
BILIRUB SERPL-MCNC: 0.6 MG/DL (ref 0.1–1.5)
BUN SERPL-MCNC: 13 MG/DL (ref 8–22)
CALCIUM SERPL-MCNC: 9.5 MG/DL (ref 8.5–10.5)
CHLORIDE SERPL-SCNC: 103 MMOL/L (ref 96–112)
CO2 SERPL-SCNC: 31 MMOL/L (ref 20–33)
CREAT SERPL-MCNC: 0.76 MG/DL (ref 0.5–1.4)
GLOBULIN SER CALC-MCNC: 3.6 G/DL (ref 1.9–3.5)
GLUCOSE SERPL-MCNC: 116 MG/DL (ref 65–99)
POTASSIUM SERPL-SCNC: 4 MMOL/L (ref 3.6–5.5)
PROT SERPL-MCNC: 7.4 G/DL (ref 6–8.2)
SODIUM SERPL-SCNC: 140 MMOL/L (ref 135–145)

## 2018-04-25 NOTE — PROGRESS NOTES
"Pt answered phone and saw Dr. Rubi yesterday.  Pt stated that she plans to get radiation, but chemo is just \"too much for an old lady like me.\"  She was given a phone number for Dr. Chan's office.  Pt stated that she has a transportation problem and paid $400 in co-pays last month which left her with $70 for groceries for the month.  Mailing pt ONN letter with applications for [facility] [employer] and [employer].  Plan to follow up again with pt in a few weeks.  dsw  "

## 2018-04-27 ENCOUNTER — PATIENT OUTREACH (OUTPATIENT)
Dept: OTHER | Facility: MEDICAL CENTER | Age: 73
End: 2018-04-27

## 2018-04-27 NOTE — PROGRESS NOTES
confirmed with pt that SIM appointment is scheduled for Wednesday, May 2nd, checking in at 12:30pm for a 1:00 pm appointment.  dsw

## 2018-04-27 NOTE — PROGRESS NOTES
Called pt to follow up re [facility] appt and transportation. Pt stated that she has a ride for the [facility] appointment.  Pt mentioned that her appointment was on Wednesday the 3rd.  Since Wednesday is not the 3rd, plan to call pt again later today to clarify correct appointment date and time.  Enc pt to bring in applications and give them to [facility] staff for ONN to assist with processing.  Adbiel, [facility] in [facility], is aware.  Gave verbal description for where X[facility] is located and use of  parking.  Pt stated she will bring her walker in case she needed it which was also enc.  Contact information was given.  Pt indicated no additional questions at that time.  dsw

## 2018-05-02 ENCOUNTER — HOSPITAL ENCOUNTER (OUTPATIENT)
Dept: RADIATION ONCOLOGY | Facility: MEDICAL CENTER | Age: 73
End: 2018-05-02

## 2018-05-02 ENCOUNTER — HOSPITAL ENCOUNTER (OUTPATIENT)
Dept: RADIATION ONCOLOGY | Facility: MEDICAL CENTER | Age: 73
End: 2018-05-31
Attending: RADIOLOGY
Payer: MEDICARE

## 2018-05-02 PROCEDURE — 77334 RADIATION TREATMENT AID(S): CPT | Mod: 26 | Performed by: RADIOLOGY

## 2018-05-02 PROCEDURE — 77334 RADIATION TREATMENT AID(S): CPT | Performed by: RADIOLOGY

## 2018-05-02 PROCEDURE — 77263 THER RADIOLOGY TX PLNG CPLX: CPT | Performed by: RADIOLOGY

## 2018-05-02 PROCEDURE — 77290 THER RAD SIMULAJ FIELD CPLX: CPT | Mod: 26 | Performed by: RADIOLOGY

## 2018-05-02 PROCEDURE — 77290 THER RAD SIMULAJ FIELD CPLX: CPT | Performed by: RADIOLOGY

## 2018-05-08 PROCEDURE — 77300 RADIATION THERAPY DOSE PLAN: CPT | Performed by: RADIOLOGY

## 2018-05-08 PROCEDURE — 77295 3-D RADIOTHERAPY PLAN: CPT | Mod: 26 | Performed by: RADIOLOGY

## 2018-05-08 PROCEDURE — 77300 RADIATION THERAPY DOSE PLAN: CPT | Mod: 26 | Performed by: RADIOLOGY

## 2018-05-08 PROCEDURE — 77334 RADIATION TREATMENT AID(S): CPT | Mod: 26 | Performed by: RADIOLOGY

## 2018-05-08 PROCEDURE — 77334 RADIATION TREATMENT AID(S): CPT | Performed by: RADIOLOGY

## 2018-05-08 PROCEDURE — 77295 3-D RADIOTHERAPY PLAN: CPT | Performed by: RADIOLOGY

## 2018-05-09 PROCEDURE — 77280 THER RAD SIMULAJ FIELD SMPL: CPT | Performed by: RADIOLOGY

## 2018-05-09 PROCEDURE — 77412 RADIATION TX DELIVERY LVL 3: CPT | Performed by: RADIOLOGY

## 2018-05-09 PROCEDURE — 77280 THER RAD SIMULAJ FIELD SMPL: CPT | Mod: 26 | Performed by: RADIOLOGY

## 2018-05-10 ENCOUNTER — HOSPITAL ENCOUNTER (OUTPATIENT)
Dept: RADIATION ONCOLOGY | Facility: MEDICAL CENTER | Age: 73
End: 2018-05-10

## 2018-05-10 PROCEDURE — 77412 RADIATION TX DELIVERY LVL 3: CPT | Performed by: RADIOLOGY

## 2018-05-11 ENCOUNTER — HOSPITAL ENCOUNTER (OUTPATIENT)
Dept: RADIATION ONCOLOGY | Facility: MEDICAL CENTER | Age: 73
End: 2018-05-11

## 2018-05-11 PROCEDURE — 77412 RADIATION TX DELIVERY LVL 3: CPT | Performed by: RADIOLOGY

## 2018-05-11 PROCEDURE — 77336 RADIATION PHYSICS CONSULT: CPT | Performed by: RADIOLOGY

## 2018-05-14 ENCOUNTER — HOSPITAL ENCOUNTER (OUTPATIENT)
Dept: RADIATION ONCOLOGY | Facility: MEDICAL CENTER | Age: 73
End: 2018-05-14

## 2018-05-14 PROCEDURE — 77307 TELETHX ISODOSE PLAN CPLX: CPT | Performed by: RADIOLOGY

## 2018-05-14 PROCEDURE — 77307 TELETHX ISODOSE PLAN CPLX: CPT | Mod: 26 | Performed by: RADIOLOGY

## 2018-05-14 PROCEDURE — 77334 RADIATION TREATMENT AID(S): CPT | Performed by: RADIOLOGY

## 2018-05-14 PROCEDURE — 77412 RADIATION TX DELIVERY LVL 3: CPT | Performed by: RADIOLOGY

## 2018-05-14 PROCEDURE — 77334 RADIATION TREATMENT AID(S): CPT | Mod: 26 | Performed by: RADIOLOGY

## 2018-05-15 ENCOUNTER — HOSPITAL ENCOUNTER (OUTPATIENT)
Dept: RADIATION ONCOLOGY | Facility: MEDICAL CENTER | Age: 73
End: 2018-05-15

## 2018-05-15 PROCEDURE — 77427 RADIATION TX MANAGEMENT X5: CPT | Performed by: RADIOLOGY

## 2018-05-15 PROCEDURE — 77412 RADIATION TX DELIVERY LVL 3: CPT | Performed by: RADIOLOGY

## 2018-05-16 PROCEDURE — 77412 RADIATION TX DELIVERY LVL 3: CPT | Performed by: RADIOLOGY

## 2018-05-17 ENCOUNTER — HOSPITAL ENCOUNTER (OUTPATIENT)
Dept: RADIATION ONCOLOGY | Facility: MEDICAL CENTER | Age: 73
End: 2018-05-17

## 2018-05-17 PROCEDURE — 77412 RADIATION TX DELIVERY LVL 3: CPT | Performed by: RADIOLOGY

## 2018-05-18 ENCOUNTER — HOSPITAL ENCOUNTER (OUTPATIENT)
Dept: RADIATION ONCOLOGY | Facility: MEDICAL CENTER | Age: 73
End: 2018-05-18

## 2018-05-18 PROCEDURE — 77336 RADIATION PHYSICS CONSULT: CPT | Performed by: RADIOLOGY

## 2018-05-18 PROCEDURE — 77412 RADIATION TX DELIVERY LVL 3: CPT | Performed by: RADIOLOGY

## 2018-05-21 ENCOUNTER — HOSPITAL ENCOUNTER (OUTPATIENT)
Dept: RADIATION ONCOLOGY | Facility: MEDICAL CENTER | Age: 73
End: 2018-05-21

## 2018-05-21 ENCOUNTER — PATIENT OUTREACH (OUTPATIENT)
Dept: OTHER | Facility: MEDICAL CENTER | Age: 73
End: 2018-05-21

## 2018-05-21 PROCEDURE — 77412 RADIATION TX DELIVERY LVL 3: CPT | Performed by: RADIOLOGY

## 2018-05-23 PROCEDURE — 77427 RADIATION TX MANAGEMENT X5: CPT | Performed by: RADIOLOGY

## 2018-05-23 PROCEDURE — 77412 RADIATION TX DELIVERY LVL 3: CPT | Performed by: RADIOLOGY

## 2018-05-24 ENCOUNTER — HOSPITAL ENCOUNTER (OUTPATIENT)
Dept: RADIATION ONCOLOGY | Facility: MEDICAL CENTER | Age: 73
End: 2018-05-24

## 2018-05-24 PROCEDURE — 77412 RADIATION TX DELIVERY LVL 3: CPT | Performed by: RADIOLOGY

## 2018-05-25 ENCOUNTER — HOSPITAL ENCOUNTER (OUTPATIENT)
Dept: RADIATION ONCOLOGY | Facility: MEDICAL CENTER | Age: 73
End: 2018-05-25

## 2018-05-25 PROCEDURE — 77412 RADIATION TX DELIVERY LVL 3: CPT | Performed by: RADIOLOGY

## 2018-05-29 ENCOUNTER — HOSPITAL ENCOUNTER (OUTPATIENT)
Dept: RADIATION ONCOLOGY | Facility: MEDICAL CENTER | Age: 73
End: 2018-05-29

## 2018-05-29 PROCEDURE — 77336 RADIATION PHYSICS CONSULT: CPT | Performed by: RADIOLOGY

## 2018-05-29 PROCEDURE — 77412 RADIATION TX DELIVERY LVL 3: CPT | Performed by: RADIOLOGY

## 2018-05-30 ENCOUNTER — HOSPITAL ENCOUNTER (OUTPATIENT)
Dept: RADIATION ONCOLOGY | Facility: MEDICAL CENTER | Age: 73
End: 2018-05-30

## 2018-05-30 PROCEDURE — 77412 RADIATION TX DELIVERY LVL 3: CPT | Performed by: RADIOLOGY

## 2018-05-31 ENCOUNTER — HOSPITAL ENCOUNTER (OUTPATIENT)
Dept: RADIATION ONCOLOGY | Facility: MEDICAL CENTER | Age: 73
End: 2018-05-31

## 2018-05-31 PROCEDURE — 77412 RADIATION TX DELIVERY LVL 3: CPT | Performed by: RADIOLOGY

## 2018-05-31 PROCEDURE — 77427 RADIATION TX MANAGEMENT X5: CPT | Performed by: RADIOLOGY

## 2018-06-01 ENCOUNTER — HOSPITAL ENCOUNTER (OUTPATIENT)
Dept: RADIATION ONCOLOGY | Facility: MEDICAL CENTER | Age: 73
End: 2018-06-01

## 2018-06-01 ENCOUNTER — HOSPITAL ENCOUNTER (OUTPATIENT)
Dept: RADIATION ONCOLOGY | Facility: MEDICAL CENTER | Age: 73
End: 2018-06-01
Attending: RADIOLOGY
Payer: MEDICARE

## 2018-06-01 PROCEDURE — 77412 RADIATION TX DELIVERY LVL 3: CPT | Performed by: RADIOLOGY

## 2018-06-01 PROCEDURE — 77280 THER RAD SIMULAJ FIELD SMPL: CPT | Performed by: RADIOLOGY

## 2018-06-01 PROCEDURE — 77280 THER RAD SIMULAJ FIELD SMPL: CPT | Mod: 26 | Performed by: RADIOLOGY

## 2018-06-04 ENCOUNTER — HOSPITAL ENCOUNTER (OUTPATIENT)
Dept: RADIATION ONCOLOGY | Facility: MEDICAL CENTER | Age: 73
End: 2018-06-04

## 2018-06-04 PROCEDURE — 77014 PR CT GUIDANCE PLACEMENT RAD THERAPY FIELDS: CPT | Mod: 26 | Performed by: RADIOLOGY

## 2018-06-04 PROCEDURE — 77387 GUIDANCE FOR RADJ TX DLVR: CPT | Performed by: RADIOLOGY

## 2018-06-04 PROCEDURE — 77412 RADIATION TX DELIVERY LVL 3: CPT | Performed by: RADIOLOGY

## 2018-06-05 ENCOUNTER — HOSPITAL ENCOUNTER (OUTPATIENT)
Dept: RADIATION ONCOLOGY | Facility: MEDICAL CENTER | Age: 73
End: 2018-06-05

## 2018-06-05 PROCEDURE — 77412 RADIATION TX DELIVERY LVL 3: CPT | Performed by: RADIOLOGY

## 2018-06-05 PROCEDURE — 77014 PR CT GUIDANCE PLACEMENT RAD THERAPY FIELDS: CPT | Mod: 26 | Performed by: RADIOLOGY

## 2018-06-05 PROCEDURE — 77387 GUIDANCE FOR RADJ TX DLVR: CPT | Performed by: RADIOLOGY

## 2018-06-05 PROCEDURE — 77336 RADIATION PHYSICS CONSULT: CPT | Performed by: RADIOLOGY

## 2018-06-06 ENCOUNTER — HOSPITAL ENCOUNTER (OUTPATIENT)
Dept: RADIATION ONCOLOGY | Facility: MEDICAL CENTER | Age: 73
End: 2018-06-06
Attending: RADIOLOGY
Payer: MEDICARE

## 2018-06-06 PROCEDURE — 77387 GUIDANCE FOR RADJ TX DLVR: CPT | Performed by: RADIOLOGY

## 2018-06-06 PROCEDURE — 77412 RADIATION TX DELIVERY LVL 3: CPT | Performed by: RADIOLOGY

## 2018-06-06 PROCEDURE — 77014 PR CT GUIDANCE PLACEMENT RAD THERAPY FIELDS: CPT | Mod: 26 | Performed by: RADIOLOGY

## 2018-06-07 ENCOUNTER — HOSPITAL ENCOUNTER (OUTPATIENT)
Dept: RADIATION ONCOLOGY | Facility: MEDICAL CENTER | Age: 73
End: 2018-06-07

## 2018-06-07 PROCEDURE — 77412 RADIATION TX DELIVERY LVL 3: CPT | Performed by: RADIOLOGY

## 2018-06-07 PROCEDURE — 77014 PR CT GUIDANCE PLACEMENT RAD THERAPY FIELDS: CPT | Mod: 26 | Performed by: RADIOLOGY

## 2018-06-07 PROCEDURE — 77427 RADIATION TX MANAGEMENT X5: CPT | Performed by: RADIOLOGY

## 2018-06-07 PROCEDURE — 77387 GUIDANCE FOR RADJ TX DLVR: CPT | Performed by: RADIOLOGY

## 2018-07-03 ENCOUNTER — HOSPITAL ENCOUNTER (OUTPATIENT)
Dept: RADIOLOGY | Facility: MEDICAL CENTER | Age: 73
End: 2018-07-03
Attending: INTERNAL MEDICINE
Payer: MEDICARE

## 2018-07-03 DIAGNOSIS — C50.311 MALIGNANT NEOPLASM OF LOWER-INNER QUADRANT OF RIGHT FEMALE BREAST, UNSPECIFIED ESTROGEN RECEPTOR STATUS (HCC): ICD-10-CM

## 2018-07-03 PROCEDURE — 77080 DXA BONE DENSITY AXIAL: CPT

## 2018-07-19 ENCOUNTER — PATIENT OUTREACH (OUTPATIENT)
Dept: OTHER | Facility: MEDICAL CENTER | Age: 73
End: 2018-07-19

## 2018-07-26 ENCOUNTER — PATIENT OUTREACH (OUTPATIENT)
Dept: OTHER | Facility: MEDICAL CENTER | Age: 73
End: 2018-07-26

## 2018-07-26 NOTE — PROGRESS NOTES
Phoned to review breast cancer treatment summary/ survivorship care plan which was mailed to pt 07/19/18.  No answer, unable to leave message as phone number did not have voicemail set up.

## 2018-08-01 ENCOUNTER — PATIENT OUTREACH (OUTPATIENT)
Dept: OTHER | Facility: MEDICAL CENTER | Age: 73
End: 2018-08-01

## 2018-08-01 NOTE — PROGRESS NOTES
2nd attempt: Phoned to review breast cancer treatment summary/ survivorship care plan.  No answer, unable to leave message.

## 2018-08-06 ENCOUNTER — PATIENT OUTREACH (OUTPATIENT)
Dept: OTHER | Facility: MEDICAL CENTER | Age: 73
End: 2018-08-06

## 2018-08-06 NOTE — PROGRESS NOTES
3rd and last attempt: Phoned to review breast cancer treatment summary/ survivorship care plan.  No answer, no voicemail.

## 2019-09-03 ENCOUNTER — IMMUNIZATION (OUTPATIENT)
Dept: SOCIAL WORK | Facility: CLINIC | Age: 74
End: 2019-09-03
Payer: MEDICARE

## 2019-09-03 DIAGNOSIS — Z23 NEED FOR VACCINATION: ICD-10-CM

## 2019-09-03 PROCEDURE — 90662 IIV NO PRSV INCREASED AG IM: CPT | Performed by: REGISTERED NURSE

## 2019-09-03 PROCEDURE — G0009 ADMIN PNEUMOCOCCAL VACCINE: HCPCS | Performed by: REGISTERED NURSE

## 2019-09-03 PROCEDURE — G0008 ADMIN INFLUENZA VIRUS VAC: HCPCS | Performed by: REGISTERED NURSE

## 2019-09-03 PROCEDURE — 90732 PPSV23 VACC 2 YRS+ SUBQ/IM: CPT | Performed by: REGISTERED NURSE

## 2021-01-15 DIAGNOSIS — Z23 NEED FOR VACCINATION: ICD-10-CM

## 2021-04-05 ENCOUNTER — APPOINTMENT (OUTPATIENT)
Dept: RADIOLOGY | Facility: MEDICAL CENTER | Age: 76
DRG: 247 | End: 2021-04-05
Attending: EMERGENCY MEDICINE
Payer: MEDICARE

## 2021-04-05 ENCOUNTER — HOSPITAL ENCOUNTER (INPATIENT)
Facility: MEDICAL CENTER | Age: 76
LOS: 4 days | DRG: 247 | End: 2021-04-09
Attending: EMERGENCY MEDICINE | Admitting: STUDENT IN AN ORGANIZED HEALTH CARE EDUCATION/TRAINING PROGRAM
Payer: MEDICARE

## 2021-04-05 DIAGNOSIS — I24.9 ACUTE CORONARY SYNDROME (HCC): ICD-10-CM

## 2021-04-05 DIAGNOSIS — I21.4 NSTEMI (NON-ST ELEVATED MYOCARDIAL INFARCTION) (HCC): ICD-10-CM

## 2021-04-05 PROBLEM — E87.6 HYPOKALEMIA: Status: ACTIVE | Noted: 2021-04-05

## 2021-04-05 PROBLEM — I16.1 HYPERTENSIVE EMERGENCY: Status: ACTIVE | Noted: 2021-04-05

## 2021-04-05 LAB
ALBUMIN SERPL BCP-MCNC: 3.8 G/DL (ref 3.2–4.9)
ALBUMIN/GLOB SERPL: 1 G/DL
ALP SERPL-CCNC: 70 U/L (ref 30–99)
ALT SERPL-CCNC: 27 U/L (ref 2–50)
ANION GAP SERPL CALC-SCNC: 11 MMOL/L (ref 7–16)
APTT PPP: 31.9 SEC (ref 24.7–36)
AST SERPL-CCNC: 34 U/L (ref 12–45)
BASOPHILS # BLD AUTO: 0.4 % (ref 0–1.8)
BASOPHILS # BLD: 0.03 K/UL (ref 0–0.12)
BILIRUB SERPL-MCNC: 0.4 MG/DL (ref 0.1–1.5)
BUN SERPL-MCNC: 12 MG/DL (ref 8–22)
CALCIUM SERPL-MCNC: 9 MG/DL (ref 8.5–10.5)
CHLORIDE SERPL-SCNC: 101 MMOL/L (ref 96–112)
CO2 SERPL-SCNC: 29 MMOL/L (ref 20–33)
CREAT SERPL-MCNC: 0.67 MG/DL (ref 0.5–1.4)
EKG IMPRESSION: NORMAL
EKG IMPRESSION: NORMAL
EOSINOPHIL # BLD AUTO: 0.06 K/UL (ref 0–0.51)
EOSINOPHIL NFR BLD: 0.7 % (ref 0–6.9)
ERYTHROCYTE [DISTWIDTH] IN BLOOD BY AUTOMATED COUNT: 46.7 FL (ref 35.9–50)
GLOBULIN SER CALC-MCNC: 3.7 G/DL (ref 1.9–3.5)
GLUCOSE SERPL-MCNC: 110 MG/DL (ref 65–99)
HCT VFR BLD AUTO: 47.8 % (ref 37–47)
HGB BLD-MCNC: 15.3 G/DL (ref 12–16)
IMM GRANULOCYTES # BLD AUTO: 0.03 K/UL (ref 0–0.11)
IMM GRANULOCYTES NFR BLD AUTO: 0.4 % (ref 0–0.9)
INR PPP: 1 (ref 0.87–1.13)
LYMPHOCYTES # BLD AUTO: 2.13 K/UL (ref 1–4.8)
LYMPHOCYTES NFR BLD: 25.3 % (ref 22–41)
MCH RBC QN AUTO: 30.5 PG (ref 27–33)
MCHC RBC AUTO-ENTMCNC: 32 G/DL (ref 33.6–35)
MCV RBC AUTO: 95.2 FL (ref 81.4–97.8)
MONOCYTES # BLD AUTO: 0.86 K/UL (ref 0–0.85)
MONOCYTES NFR BLD AUTO: 10.2 % (ref 0–13.4)
NEUTROPHILS # BLD AUTO: 5.31 K/UL (ref 2–7.15)
NEUTROPHILS NFR BLD: 63 % (ref 44–72)
NRBC # BLD AUTO: 0 K/UL
NRBC BLD-RTO: 0 /100 WBC
NT-PROBNP SERPL IA-MCNC: 2276 PG/ML (ref 0–125)
PLATELET # BLD AUTO: 268 K/UL (ref 164–446)
PMV BLD AUTO: 10.1 FL (ref 9–12.9)
POTASSIUM SERPL-SCNC: 3.3 MMOL/L (ref 3.6–5.5)
PROT SERPL-MCNC: 7.5 G/DL (ref 6–8.2)
PROTHROMBIN TIME: 13.5 SEC (ref 12–14.6)
RBC # BLD AUTO: 5.02 M/UL (ref 4.2–5.4)
SARS-COV+SARS-COV-2 AG RESP QL IA.RAPID: NOTDETECTED
SARS-COV-2 RNA RESP QL NAA+PROBE: NOTDETECTED
SODIUM SERPL-SCNC: 141 MMOL/L (ref 135–145)
SPECIMEN SOURCE: NORMAL
SPECIMEN SOURCE: NORMAL
TROPONIN T SERPL-MCNC: 182 NG/L (ref 6–19)
TROPONIN T SERPL-MCNC: 257 NG/L (ref 6–19)
TSH SERPL DL<=0.005 MIU/L-ACNC: 2.59 UIU/ML (ref 0.38–5.33)
UFH PPP CHRO-ACNC: 0.16 IU/ML
UFH PPP CHRO-ACNC: <0.1 IU/ML
WBC # BLD AUTO: 8.4 K/UL (ref 4.8–10.8)

## 2021-04-05 PROCEDURE — 93005 ELECTROCARDIOGRAM TRACING: CPT | Performed by: EMERGENCY MEDICINE

## 2021-04-05 PROCEDURE — 99223 1ST HOSP IP/OBS HIGH 75: CPT | Mod: AI | Performed by: STUDENT IN AN ORGANIZED HEALTH CARE EDUCATION/TRAINING PROGRAM

## 2021-04-05 PROCEDURE — 36415 COLL VENOUS BLD VENIPUNCTURE: CPT

## 2021-04-05 PROCEDURE — 96366 THER/PROPH/DIAG IV INF ADDON: CPT

## 2021-04-05 PROCEDURE — 99223 1ST HOSP IP/OBS HIGH 75: CPT | Performed by: INTERNAL MEDICINE

## 2021-04-05 PROCEDURE — 700111 HCHG RX REV CODE 636 W/ 250 OVERRIDE (IP): Performed by: EMERGENCY MEDICINE

## 2021-04-05 PROCEDURE — 87426 SARSCOV CORONAVIRUS AG IA: CPT

## 2021-04-05 PROCEDURE — A9270 NON-COVERED ITEM OR SERVICE: HCPCS | Performed by: STUDENT IN AN ORGANIZED HEALTH CARE EDUCATION/TRAINING PROGRAM

## 2021-04-05 PROCEDURE — 96375 TX/PRO/DX INJ NEW DRUG ADDON: CPT

## 2021-04-05 PROCEDURE — 85520 HEPARIN ASSAY: CPT

## 2021-04-05 PROCEDURE — A9270 NON-COVERED ITEM OR SERVICE: HCPCS | Performed by: INTERNAL MEDICINE

## 2021-04-05 PROCEDURE — 84484 ASSAY OF TROPONIN QUANT: CPT

## 2021-04-05 PROCEDURE — 700102 HCHG RX REV CODE 250 W/ 637 OVERRIDE(OP): Performed by: STUDENT IN AN ORGANIZED HEALTH CARE EDUCATION/TRAINING PROGRAM

## 2021-04-05 PROCEDURE — 770020 HCHG ROOM/CARE - TELE (206)

## 2021-04-05 PROCEDURE — U0005 INFEC AGEN DETEC AMPLI PROBE: HCPCS

## 2021-04-05 PROCEDURE — 85730 THROMBOPLASTIN TIME PARTIAL: CPT

## 2021-04-05 PROCEDURE — 700101 HCHG RX REV CODE 250: Performed by: STUDENT IN AN ORGANIZED HEALTH CARE EDUCATION/TRAINING PROGRAM

## 2021-04-05 PROCEDURE — 85025 COMPLETE CBC W/AUTO DIFF WBC: CPT

## 2021-04-05 PROCEDURE — 83880 ASSAY OF NATRIURETIC PEPTIDE: CPT

## 2021-04-05 PROCEDURE — A9270 NON-COVERED ITEM OR SERVICE: HCPCS | Performed by: EMERGENCY MEDICINE

## 2021-04-05 PROCEDURE — U0003 INFECTIOUS AGENT DETECTION BY NUCLEIC ACID (DNA OR RNA); SEVERE ACUTE RESPIRATORY SYNDROME CORONAVIRUS 2 (SARS-COV-2) (CORONAVIRUS DISEASE [COVID-19]), AMPLIFIED PROBE TECHNIQUE, MAKING USE OF HIGH THROUGHPUT TECHNOLOGIES AS DESCRIBED BY CMS-2020-01-R: HCPCS

## 2021-04-05 PROCEDURE — 96365 THER/PROPH/DIAG IV INF INIT: CPT

## 2021-04-05 PROCEDURE — 80053 COMPREHEN METABOLIC PANEL: CPT

## 2021-04-05 PROCEDURE — 700102 HCHG RX REV CODE 250 W/ 637 OVERRIDE(OP): Performed by: INTERNAL MEDICINE

## 2021-04-05 PROCEDURE — 99285 EMERGENCY DEPT VISIT HI MDM: CPT

## 2021-04-05 PROCEDURE — 71045 X-RAY EXAM CHEST 1 VIEW: CPT

## 2021-04-05 PROCEDURE — 85610 PROTHROMBIN TIME: CPT

## 2021-04-05 PROCEDURE — 700102 HCHG RX REV CODE 250 W/ 637 OVERRIDE(OP): Performed by: EMERGENCY MEDICINE

## 2021-04-05 PROCEDURE — 84443 ASSAY THYROID STIM HORMONE: CPT

## 2021-04-05 PROCEDURE — 93005 ELECTROCARDIOGRAM TRACING: CPT

## 2021-04-05 PROCEDURE — C9803 HOPD COVID-19 SPEC COLLECT: HCPCS | Performed by: EMERGENCY MEDICINE

## 2021-04-05 RX ORDER — BISACODYL 10 MG
10 SUPPOSITORY, RECTAL RECTAL
Status: DISCONTINUED | OUTPATIENT
Start: 2021-04-05 | End: 2021-04-09 | Stop reason: HOSPADM

## 2021-04-05 RX ORDER — CAPTOPRIL 12.5 MG/1
12.5 TABLET ORAL EVERY 8 HOURS PRN
Status: DISCONTINUED | OUTPATIENT
Start: 2021-04-05 | End: 2021-04-09 | Stop reason: HOSPADM

## 2021-04-05 RX ORDER — HEPARIN SODIUM 5000 [USP'U]/100ML
0-30 INJECTION, SOLUTION INTRAVENOUS CONTINUOUS
Status: DISCONTINUED | OUTPATIENT
Start: 2021-04-05 | End: 2021-04-08

## 2021-04-05 RX ORDER — ATORVASTATIN CALCIUM 80 MG/1
80 TABLET, FILM COATED ORAL EVERY EVENING
Status: DISCONTINUED | OUTPATIENT
Start: 2021-04-05 | End: 2021-04-09 | Stop reason: HOSPADM

## 2021-04-05 RX ORDER — SODIUM CHLORIDE 9 MG/ML
INJECTION, SOLUTION INTRAVENOUS CONTINUOUS
Status: DISCONTINUED | OUTPATIENT
Start: 2021-04-05 | End: 2021-04-09

## 2021-04-05 RX ORDER — ONDANSETRON 4 MG/1
4 TABLET, ORALLY DISINTEGRATING ORAL EVERY 4 HOURS PRN
Status: DISCONTINUED | OUTPATIENT
Start: 2021-04-05 | End: 2021-04-09 | Stop reason: HOSPADM

## 2021-04-05 RX ORDER — LABETALOL HYDROCHLORIDE 5 MG/ML
10 INJECTION, SOLUTION INTRAVENOUS EVERY 4 HOURS PRN
Status: DISCONTINUED | OUTPATIENT
Start: 2021-04-05 | End: 2021-04-09 | Stop reason: HOSPADM

## 2021-04-05 RX ORDER — ONDANSETRON 2 MG/ML
4 INJECTION INTRAMUSCULAR; INTRAVENOUS EVERY 4 HOURS PRN
Status: DISCONTINUED | OUTPATIENT
Start: 2021-04-05 | End: 2021-04-09 | Stop reason: HOSPADM

## 2021-04-05 RX ORDER — ACETAMINOPHEN 325 MG/1
650 TABLET ORAL EVERY 6 HOURS PRN
Status: DISCONTINUED | OUTPATIENT
Start: 2021-04-05 | End: 2021-04-09 | Stop reason: HOSPADM

## 2021-04-05 RX ORDER — HEPARIN SODIUM 1000 [USP'U]/ML
2000 INJECTION, SOLUTION INTRAVENOUS; SUBCUTANEOUS PRN
Status: DISCONTINUED | OUTPATIENT
Start: 2021-04-05 | End: 2021-04-08

## 2021-04-05 RX ORDER — POLYETHYLENE GLYCOL 3350 17 G/17G
1 POWDER, FOR SOLUTION ORAL
Status: DISCONTINUED | OUTPATIENT
Start: 2021-04-05 | End: 2021-04-09 | Stop reason: HOSPADM

## 2021-04-05 RX ORDER — POTASSIUM CHLORIDE 20 MEQ/1
40 TABLET, EXTENDED RELEASE ORAL ONCE
Status: COMPLETED | OUTPATIENT
Start: 2021-04-05 | End: 2021-04-05

## 2021-04-05 RX ORDER — ASPIRIN 81 MG/1
81 TABLET, CHEWABLE ORAL DAILY
Status: DISCONTINUED | OUTPATIENT
Start: 2021-04-06 | End: 2021-04-09 | Stop reason: HOSPADM

## 2021-04-05 RX ORDER — ASPIRIN 300 MG/1
300 SUPPOSITORY RECTAL ONCE
Status: COMPLETED | OUTPATIENT
Start: 2021-04-05 | End: 2021-04-05

## 2021-04-05 RX ORDER — HEPARIN SODIUM 1000 [USP'U]/ML
4000 INJECTION, SOLUTION INTRAVENOUS; SUBCUTANEOUS ONCE
Status: COMPLETED | OUTPATIENT
Start: 2021-04-05 | End: 2021-04-05

## 2021-04-05 RX ORDER — ASPIRIN 81 MG/1
324 TABLET, CHEWABLE ORAL ONCE
Status: COMPLETED | OUTPATIENT
Start: 2021-04-05 | End: 2021-04-05

## 2021-04-05 RX ORDER — AMOXICILLIN 250 MG
2 CAPSULE ORAL 2 TIMES DAILY
Status: DISCONTINUED | OUTPATIENT
Start: 2021-04-05 | End: 2021-04-09 | Stop reason: HOSPADM

## 2021-04-05 RX ADMIN — HEPARIN SODIUM 14 UNITS/KG/HR: 5000 INJECTION, SOLUTION INTRAVENOUS at 19:14

## 2021-04-05 RX ADMIN — ATORVASTATIN CALCIUM 80 MG: 80 TABLET, FILM COATED ORAL at 13:22

## 2021-04-05 RX ADMIN — HEPARIN SODIUM 2000 UNITS: 1000 INJECTION, SOLUTION INTRAVENOUS; SUBCUTANEOUS at 19:12

## 2021-04-05 RX ADMIN — HEPARIN SODIUM 4000 UNITS: 1000 INJECTION, SOLUTION INTRAVENOUS; SUBCUTANEOUS at 11:34

## 2021-04-05 RX ADMIN — POTASSIUM CHLORIDE 40 MEQ: 1500 TABLET, EXTENDED RELEASE ORAL at 13:21

## 2021-04-05 RX ADMIN — HEPARIN SODIUM 12 UNITS/KG/HR: 5000 INJECTION, SOLUTION INTRAVENOUS at 11:35

## 2021-04-05 RX ADMIN — ASPIRIN 324 MG: 81 TABLET, CHEWABLE ORAL at 10:30

## 2021-04-05 RX ADMIN — METOPROLOL TARTRATE 25 MG: 25 TABLET, FILM COATED ORAL at 21:33

## 2021-04-05 RX ADMIN — NITROGLYCERIN 1 INCH: 20 OINTMENT TOPICAL at 11:21

## 2021-04-05 RX ADMIN — LABETALOL HYDROCHLORIDE 10 MG: 5 INJECTION, SOLUTION INTRAVENOUS at 13:22

## 2021-04-05 ASSESSMENT — LIFESTYLE VARIABLES
CONSUMPTION TOTAL: NEGATIVE
ALCOHOL_USE: NO
HAVE PEOPLE ANNOYED YOU BY CRITICIZING YOUR DRINKING: NO
ON A TYPICAL DAY WHEN YOU DRINK ALCOHOL HOW MANY DRINKS DO YOU HAVE: 0
DO YOU DRINK ALCOHOL: NO
AVERAGE NUMBER OF DAYS PER WEEK YOU HAVE A DRINK CONTAINING ALCOHOL: 0
EVER FELT BAD OR GUILTY ABOUT YOUR DRINKING: NO
HAVE YOU EVER FELT YOU SHOULD CUT DOWN ON YOUR DRINKING: NO
TOTAL SCORE: 0
TOTAL SCORE: 0
EVER HAD A DRINK FIRST THING IN THE MORNING TO STEADY YOUR NERVES TO GET RID OF A HANGOVER: NO
TOTAL SCORE: 0
HOW MANY TIMES IN THE PAST YEAR HAVE YOU HAD 5 OR MORE DRINKS IN A DAY: 0

## 2021-04-05 ASSESSMENT — ENCOUNTER SYMPTOMS
PSYCHIATRIC NEGATIVE: 1
EYES NEGATIVE: 1
FEVER: 1
CLAUDICATION: 0
WEAKNESS: 1
PALPITATIONS: 0
SHORTNESS OF BREATH: 1
MUSCULOSKELETAL NEGATIVE: 1
ORTHOPNEA: 1
COUGH: 1
SPUTUM PRODUCTION: 0
PND: 1
GASTROINTESTINAL NEGATIVE: 1

## 2021-04-05 ASSESSMENT — COGNITIVE AND FUNCTIONAL STATUS - GENERAL
CLIMB 3 TO 5 STEPS WITH RAILING: A LITTLE
SUGGESTED CMS G CODE MODIFIER DAILY ACTIVITY: CH
WALKING IN HOSPITAL ROOM: A LITTLE
MOBILITY SCORE: 22
DAILY ACTIVITIY SCORE: 24
SUGGESTED CMS G CODE MODIFIER MOBILITY: CJ

## 2021-04-05 ASSESSMENT — PATIENT HEALTH QUESTIONNAIRE - PHQ9
1. LITTLE INTEREST OR PLEASURE IN DOING THINGS: NOT AT ALL
2. FEELING DOWN, DEPRESSED, IRRITABLE, OR HOPELESS: NOT AT ALL
SUM OF ALL RESPONSES TO PHQ9 QUESTIONS 1 AND 2: 0

## 2021-04-05 ASSESSMENT — FIBROSIS 4 INDEX
FIB4 SCORE: 1.78
FIB4 SCORE: 1.78

## 2021-04-05 ASSESSMENT — PAIN DESCRIPTION - PAIN TYPE: TYPE: ACUTE PAIN

## 2021-04-05 NOTE — ED NOTES
Med Rec complete per pt  Allergies Reviewed  No ABX in the last 14 days    Pt has not taken any RX's medications in over 1 year.

## 2021-04-05 NOTE — PROGRESS NOTES
Spiritual Care Note    Patient Information     Patient's Name: Sania Viera   MRN: [id number]    YOB: 1945   Age and Gender: 75 y.o. female   Service Area: [facility]   Room (and Bed): [place] [address]   Ethnicity or Nationality:     Primary Language: English   Orthodox/Spiritual preference: None   Place of Residence: [place]   Family/Friends/Others Present: No   Clinical Team Present: No   Medical Diagnosis(-es)/Procedure(s): NSTEMI   Code Status: Full Code    Date of Admission: 4/5/2021   Length of Stay: 0 days        Spiritual Care Provider Information:  Name of Spiritual Care Provider: Kush Machado  Title of Spiritual Care Provider: Associate   Phone Number: [phone]  E-mail: [email or web address]  Total time : 5 minutes    Spiritual Screen Results:    Gen Nursing  Spiritual Screen  Was spiritual care education provided to the patient?: Yes     Palliative Care  PC Orthodox/Spiritual Screening  Was spiritual care education provided to the patient?: Yes      Encounter/Request Information    Encounter/Request Type   Visited With: Patient    Nature of the Visit: Initial, On shift    Continue Visiting: No    General Visit: Yes    Referral From/ Origin of Request: Epic nursing    Religous Needs/Values       Spiritual Assessment     Spiritual Care Encounters  Observations/Symptoms: Thankfulness    Interacton/Conversation: Pt related that she did not get much sleep and felt very tired at the time of the 's visit.  explained that he understood and if she'd like another visit to let her nurse know. Pt thanked  for coming     Interventions: Compassionate Presence    Outcomes: Ability to Communicate with Truth and Honesty    Plan: Visit Upon Request    Notes:

## 2021-04-05 NOTE — ED PROVIDER NOTES
"ED Provider Note    CHIEF COMPLAINT  Chief Complaint   Patient presents with   • Chest Pressure     BIB EMS for worsening chest pressure x4 days. Weakness       HPI  Sania Viera is a 75 y.o. female who presents complaints of intermittent chest discomfort.  It comes and goes, initially she is not really sure what makes it come on.  However she does point out that when she is trying to walk her dog, she really gets symptomatic with weakness, shortness of breath and discomfort.  She describes the pain in her jaw which goes down her neck settling in her chest and her left upper extremity.  It is presently gone.  She is never had this before.  She denies any cough or cold symptoms.  No leg pain or swelling.  No fever.  There is no other complaint.    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    • Urinary incontinence     wears pads       FAMILY HISTORY  Family History   Problem Relation Age of Onset   • Clotting Disorder Mother    • Heart Attack Father    • Heart Disease Brother    • Diabetes Brother    • Cancer Maternal Aunt    • Cancer Maternal Aunt        SOCIAL HISTORY  Social History     Tobacco Use   • Smoking status: Former Smoker     Packs/day: 0.50     Years: 25.00     Pack years: 12.50     Types: Cigarettes     Quit date: 1990     Years since quittin.2   • Smokeless tobacco: Never Used   Substance Use Topics   • Alcohol use: Yes     Comment: 4 times a year   • Drug use: No         SURGICAL HISTORY  Past Surgical " "History:   Procedure Laterality Date   • MASTECTOMY Right 3/8/2018    Procedure: MASTECTOMY - PARTIAL;  Surgeon: Ana Giron M.D.;  Location: SURGERY [facility];  Service: General   • NODE BIOPSY SENTINEL Right 3/8/2018    Procedure: NODE BIOPSY SENTINEL - AXILLARY;  Surgeon: Ana Giron M.D.;  Location: SURGERY [facility];  Service: General   • BREAST BIOPSY Right 3/1/2018    Procedure: BREAST BIOPSY- EXCISION , MEDIAL LESION, EXCISION OF MASS, LATERAL  AFTER WIRE LOCALIZATION  ;  Surgeon: Ana Giron M.D.;  Location: SURGERY [facility];  Service: General   • ANGIOPLASTY  5/2006    OM IVANIA in [place]   • STENT PLACEMENT  2005    [place]   • OTHER  1956    tonsillectomy       CURRENT MEDICATIONS  None  I have reviewed the nurses notes and/or the list brought with the patient.    ALLERGIES  Allergies   Allergen Reactions   • Iodine Rash and Itching     She reports rash and severe significant itching after her cardiac catheterization in 2006, unclear if this was the topical scrub or related to IV contrast, for these reasons she does avoid shellfish     • Misc Natural Products Rash     nickel/metal   • Tape Rash     Paper tape ok       REVIEW OF SYSTEMS  See HPI for further details. Review of systems as above, otherwise all other systems are negative.     PHYSICAL EXAM  VITAL SIGNS: Temp 36.3 °C (97.4 °F) (Temporal)   Ht 1.72 m (5' 7.72\")   Wt 110 kg (242 lb 8.1 oz)   BMI 37.18 kg/m²     Constitutional: Well appearing patient in no acute distress.  Not toxic, nor ill in appearance.  HENT: Mucus membranes moist.  Oropharynx is clear.  Eyes: Pupils equally round.  No scleral icterus.   Neck: Full nontender range of motion.  Lymphatic: No cervical lymphadenopathy noted.   Cardiovascular: Regular heart rate and rhythm.  No murmurs, rubs, nor gallop appreciated.   Thorax & Lungs: Chest is nontender.  Lungs are clear to auscultation with good air movement bilaterally.  No wheeze, rhonchi, nor " rales.   Abdomen: Soft, with no tenderness, rebound nor guarding.  No mass, pulsatile mass, nor hepatosplenomegaly appreciated.  Skin: No purpura nor petechia noted.  Extremities/Musculoskeletal: No sign of trauma.  Calves are nontender with no cords nor edema.  No Marlene's sign.  Pulses are intact all around.   Neurologic: Alert & oriented.  Strength and sensation is intact all around.  Psychiatric: Normal affect appropriate for the clinical situation.    EKG #1  I interpreted this EKG myself.  This is a 12-lead study.  The rhythm is sinus with a rate of 84.  There is anterior lateral ST segment depression.  There is however no ST segment elevation.  Interpretation: No ST segment elevation myocardial infarction.    EKG #2  I interpreted this EKG myself.  This is a 12-lead study.  The rhythm is sinus with a rate of 83.  There are no ST segment nor T wave abnormalities.  Interpretation: No ST segment elevation myocardial infarction.    LABS  Labs Reviewed   CBC WITH DIFFERENTIAL - Abnormal; Notable for the following components:       Result Value    Hematocrit 47.8 (*)     MCHC 32.0 (*)     Monos (Absolute) 0.86 (*)     All other components within normal limits   COMP METABOLIC PANEL - Abnormal; Notable for the following components:    Potassium 3.3 (*)     Glucose 110 (*)     Globulin 3.7 (*)     All other components within normal limits   TROPONIN - Abnormal; Notable for the following components:    Troponin T 182 (*)     All other components within normal limits   ESTIMATED GFR   APTT   PROTHROMBIN TIME   HEPARIN XA (UNFRACTIONATED)   SARS-COV ANTIGEN ROGER   SARS-COV-2, PCR (IN-HOUSE)         RADIOLOGY/PROCEDURES  I have reviewed the patient's film interpretations myself, and they are read out by the radiologist as:   DX-CHEST-PORTABLE (1 VIEW)   Final Result      Mild cardiomegaly and possible vascular congestion.        .    MEDICAL RECORD  I have reviewed patient's medical record and pertinent results are listed  above.    COURSE & MEDICAL DECISION MAKING  I have reviewed any medical record information, laboratory studies and radiographic results as noted above.  Patient presents with some chest symptoms which is quite suspicious for cardiac etiology.  I do not suspect pulmonary embolism, infection, or aorta as the etiology.  Although EKG does not meet STEMI criteria, is certainly suspicious for ischemia.  For this reason she is given aspirin, and I have placed a call to cardiology.    I talked to Dr. Roque. She agreed with my concern on her cardiogram. She is asked to initiate nitroglycerin paste to help with the blood pressure and start heparin drip. I spoke with the pharmacist to help coordinate this.    Laboratories do return showing elevated troponin. She continues to be asymptomatic here.    Case was discussed with Dr. Torrez, [facility] hospital[facility]. He will be admitting.    I have also reached out to social work to seek their help courting care the patient's dog.      FINAL IMPRESSION  1. Acute coronary syndrome (HCC)    2. Critical care time, 35 minutes, which includes obtaining history from patient and prehospital historians, examination of patient, reevaluation of patient, direction of nursing staff, and discussion with admitting and consulting physicians. It does not include any procedures.       This dictation was created using voice recognition software.    Electronically signed by: Gopi Alvarenga M.D., 4/5/2021 10:22 AM

## 2021-04-05 NOTE — CONSULTS
Consults   Cardiology Initial Consultation    Date of Service  4/5/2021    Referring Physician  Gopi Alvarenga M.D.     Reason for Consultation  ACS, CP, abnormal ECG    History of Presenting Illness  Sania Viera is a 75 y.o. female 4/5/2021 with CP.  Began with right ear pain which radiated down to her jaw and down to her left shoulder a few days prior to admission.  Then associated with discomfort across the chest.  Some components reminded her of prior MI and some did not.  She would then start to have chest pressure with activity and have to stop to rest.  Chest pressure began on Friday and was worse on Saturday.  Then persisted on and off on Sunday.  Presented to the hospital on Monday.    Has some mild chronic dyspnea on exertion, especially with stairs.  Intermittently has palpitations.  No stroke symptoms, no buzz orthopnea, no lightheadedness, no syncope.    Found to have a systolic blood pressure of 201, diffuse ST changes for concerning for ischemia.  Given nitro, heparin and aspirin.  At present, her discomfort has all but resolved, she only has a mild earache.  High-sensitivity opponent 182, 257.    Has prior coronary artery disease, MI with stent in 2006 to OM, mild to moderate residual disease in the LAD and RCA, lost to follow-up.  Financial issues included and difficulty accessing meds.  Has taken aspirin but no other heart medications for 2 years.    Had breast cancer, radiation and surgery on the right, tells me she was told to do chemo but did not do it because of transportation issues.    Has hypertension, hyperlipidemia, BMI of 35.    Former smoker, quit around 1990.  Current alcohol use.    Review of Systems  Review of Systems    All systems reviewed and negative.    Past Medical History   has a past medical history of Arthritis, Bowel habit changes (03/01/2018), Breast mass, Breath shortness, Bronchitis (2003), CAD (coronary artery disease) (2005), Dental disorder, Dyslipidemia,  Essential hypertension, Hemorrhagic disorder (HCC), High cholesterol, Myocardial infarct (HCC) (2005), Obesity, Renal disorder, and Urinary incontinence.    Surgical History   has a past surgical history that includes angioplasty (5/2006); breast biopsy (Right, 3/1/2018); other (1956); mastectomy (Right, 3/8/2018); node biopsy sentinel (Right, 3/8/2018); and stent placement (2005).    Family History  family history includes Cancer in her maternal aunt and maternal aunt; Clotting Disorder in her mother; Diabetes in her brother; Heart Attack in her father; Heart Disease in her brother.      Social History   reports that she quit smoking about 31 years ago. Her smoking use included cigarettes. She has a 12.50 pack-year smoking history. She has never used smokeless tobacco. She reports current alcohol use. She reports that she does not use drugs.    Medications  Prior to Admission Medications   Prescriptions Last Dose Informant Patient Reported? Taking?   aspirin (ASA) 81 MG Chew Tab chewable tablet 4/5/2021 at AM Patient Yes No   Sig: Take 1 Tab by mouth every day.      Facility-Administered Medications: None       Allergies  Allergies   Allergen Reactions   • Iodine Rash and Itching     She reports rash and severe significant itching after her cardiac catheterization in 2006, unclear if this was the topical scrub or related to IV contrast, for these reasons she does avoid shellfish     • Misc Natural Products Rash     nickel/metal   • Tape Rash     Paper tape ok       Vital signs in last 24 hours  Temp:  [36.3 °C (97.4 °F)] 36.3 °C (97.4 °F)  Pulse:  [74-84] 74  Resp:  [22-25] 25  BP: (190-201)/(93-95) 201/95  SpO2:  [93 %-94 %] 94 %    Physical Exam  Physical Exam      General: No acute distress. Well nourished.  HEENT: EOM grossly intact, no scleral icterus, no pharyngeal erythema.   Neck:  No JVD, no bruits, trachea midline  CVS: RRR. Normal S1, S2. No M/R/G. No LE edema.  2+ radial pulses, 2+ PT pulses  Resp:  CTAB. No wheezing or crackles/rhonchi. Normal respiratory effort.  Abdomen: Soft, NT, no buzz hepatomegaly.  MSK/Ext: No clubbing or cyanosis.  Skin: Warm and dry, no rashes.  Neurological: CN III-XII grossly intact. No focal deficits.   Psych: A&O x 3, appropriate affect, good judgement      Lab Review  Lab Results   Component Value Date/Time    WBC 8.4 04/05/2021 10:19 AM    RBC 5.02 04/05/2021 10:19 AM    HEMOGLOBIN 15.3 04/05/2021 10:19 AM    HEMATOCRIT 47.8 (H) 04/05/2021 10:19 AM    MCV 95.2 04/05/2021 10:19 AM    MCH 30.5 04/05/2021 10:19 AM    MCHC 32.0 (L) 04/05/2021 10:19 AM    MPV 10.1 04/05/2021 10:19 AM      Lab Results   Component Value Date/Time    SODIUM 140 04/24/2018 09:36 PM    POTASSIUM 4.0 04/24/2018 09:36 PM    CHLORIDE 103 04/24/2018 09:36 PM    CO2 31 04/24/2018 09:36 PM    GLUCOSE 116 (H) 04/24/2018 09:36 PM    BUN 13 04/24/2018 09:36 PM    CREATININE 0.76 04/24/2018 09:36 PM      Lab Results   Component Value Date/Time    ASTSGOT 61 (H) 04/24/2018 09:36 PM    ALTSGPT 43 04/24/2018 09:36 PM     Lab Results   Component Value Date/Time    CHOLSTRLTOT 148 03/02/2017 10:20 AM    LDL 75 03/02/2017 10:20 AM    HDL 39 (A) 03/02/2017 10:20 AM    TRIGLYCERIDE 170 (H) 03/02/2017 10:20 AM       No results for input(s): NTPROBNP in the last 72 hours.    Cardiac Imaging and Procedures Review  EKG:  My personal interpretation of the EKG dated 4-5-21 is sinus, 82, diffuse ST depression, concerning for ischemia.    Echocardiogram: None recent    Stress Test: None recent    Cardiac Catheterization: Not available    Imaging  DX-CHEST-PORTABLE (1 VIEW)   Final Result      Mild cardiomegaly and possible vascular congestion.            Assessment/Plan  -Non-ST elevation MI, chest pain improved with nitro  -CAD, prior stent to the OM around 2006, residual mild to moderate RCA and LAD disease  -HTN, uncontrolled  -HLP  -BMI 35  -Nonadherence to medical regimen due to financial concerns  -Former  smoker    Plan:  -Continue nitro  -Heparin drip overnight  -Left heart catheterization tomorrow  -Echo  -Resume CAD meds including beta-blocker and statin  -Lipids  -If she has recurrence of chest pain, okay to give sublingual nitroglycerin, if recurrent chest pain is associated with elevated hypertension then nitro drip would be appropriate    Discussed with Dr. Abdiel Alvarenga      Cardiology will continue to follow along.      Thank you for allowing me to participate in the care of this patient.    Please contact me with any questions.    Vania Roque M.D.   Cardiologist, [facility]  [phone]

## 2021-04-05 NOTE — ED TRIAGE NOTES
Chief Complaint   Patient presents with   • Chest Pressure     BIB EMS for worsening chest pressure x4 days. Weakness

## 2021-04-05 NOTE — ASSESSMENT & PLAN NOTE
4-day history of episodic substernal chest pain that is triggered on exertion and resolved with rest  H/O CAD status post stent 2005/RCA IVANIA 2006  Troponin 300's, stable, chest pain resolved  NPO, left heart catheterization today.  On heparin drip  Aspirin and high-dose statin  Echocardiogram shows EF 45%, anterolateral wall hypokinesis  Cardiology following, appreciate recommendations

## 2021-04-05 NOTE — ED NOTES
Lab terissa from Lab called with critical result of troponin at 1111. Critical lab result read back to .   Dr. Alvarenga notified of critical lab result at 1112 .  Critical lab result read back by Dr. Alvarenga.

## 2021-04-05 NOTE — H&P
Hospital Medicine History & Physical Note    Date of Service  4/5/2021    Primary Care Physician  No primary care provider on file.    Consultants  Cardiology (Dr. Roque)    Code Status  Full Code    Chief Complaint  Chief Complaint   Patient presents with   • Chest Pressure     BIB EMS for worsening chest pressure x4 days. Weakness       History of Presenting Illness  75 y.o. female with a past medical history of CAD status post stent RCA IVANIA 2005, dyslipidemia and hypertension who has not been taking her medication for the last 2 years sent to the emergency department on 4/5/2021 with a 4-day history of intermittent oppressive chest pain that radiates to jaw and left hand.  Pain is triggered on exertion and is alleviated by rest.  Episodes usually lasts around 10 to 20 minutes.  She has had a total of 3-4 episodes for the last 4 days and her last one resolved at the ED.  She associates her chest pain with dyspnea on exertion that resolves with rest, orthopnea, and worsening lower extremity swelling.  Patient states that she has not been taking her medications for the last 2 years as she is unable to afford them.  Her stent were placed in [facility].  She has follow-up with renal cardiology but was lost to follow-up secondary to financial restraints.    At the emergency department, vital signs with tachypnea in the 20s and hypotension up to the 230s.  Patient history on room air.  No leukocytosis or anemia on CBC.  Chemistry with hypokalemia. EKG with no ST-T wave changes. Troponin at 182.  Chest x-ray without cardiopulmonary process.  EDP discussed case with cardiology (Dr. Roque), for which patient was started on nitroglycerin and heparin drip.  Patient was admitted to telemetry for NSTEMI work-up and management.    Review of Systems  Review of Systems   Constitutional: Positive for fever and malaise/fatigue.   HENT: Negative.    Eyes: Negative.    Respiratory: Positive for cough and shortness  of breath. Negative for sputum production.    Cardiovascular: Positive for chest pain, orthopnea, leg swelling and PND. Negative for palpitations and claudication.   Gastrointestinal: Negative.    Genitourinary: Negative.    Musculoskeletal: Negative.    Skin: Negative.    Neurological: Positive for weakness.   Endo/Heme/Allergies: Negative.    Psychiatric/Behavioral: Negative.        Past Medical History   has a past medical history of Arthritis, Bowel habit changes (03/01/2018), Breast mass, Breath shortness, Bronchitis (2003), CAD (coronary artery disease) (2005), Dental disorder, Dyslipidemia, Essential hypertension, Hemorrhagic disorder (HCC), High cholesterol, Myocardial infarct (HCC) (2005), Obesity, Renal disorder, and Urinary incontinence.    Surgical History   has a past surgical history that includes angioplasty (5/2006); breast biopsy (Right, 3/1/2018); other (1956); mastectomy (Right, 3/8/2018); node biopsy sentinel (Right, 3/8/2018); and stent placement (2005).     Family History  family history includes Cancer in her maternal aunt and maternal aunt; Clotting Disorder in her mother; Diabetes in her brother; Heart Attack in her father; Heart Disease in her brother.     Social History   reports that she quit smoking about 31 years ago. Her smoking use included cigarettes. She has a 12.50 pack-year smoking history. She has never used smokeless tobacco. She reports current alcohol use. She reports that she does not use drugs.    Allergies  Allergies   Allergen Reactions   • Iodine Rash and Itching     She reports rash and severe significant itching after her cardiac catheterization in 2006, unclear if this was the topical scrub or related to IV contrast, for these reasons she does avoid shellfish     • Misc Natural Products Rash     nickel/metal   • Tape Rash     Paper tape ok       Medications  Prior to Admission Medications   Prescriptions Last Dose Informant Patient Reported? Taking?   aspirin (ASA) 81  MG Chew Tab chewable tablet 4/5/2021 at AM Patient Yes No   Sig: Take 1 Tab by mouth every day.      Facility-Administered Medications: None       Physical Exam  Temp:  [36.3 °C (97.4 °F)] 36.3 °C (97.4 °F)  Pulse:  [74-84] 82  Resp:  [13-20] 20  BP: (190-230)/() 203/100  SpO2:  [92 %-95 %] 92 %    Physical Exam  Constitutional:       General: She is not in acute distress.     Appearance: Normal appearance. She is obese. She is not ill-appearing, toxic-appearing or diaphoretic.   HENT:      Head: Normocephalic and atraumatic.      Mouth/Throat:      Mouth: Mucous membranes are moist.   Eyes:      Extraocular Movements: Extraocular movements intact.      Pupils: Pupils are equal, round, and reactive to light.   Cardiovascular:      Rate and Rhythm: Normal rate and regular rhythm.      Pulses: Normal pulses.      Heart sounds: Normal heart sounds.   Pulmonary:      Effort: Pulmonary effort is normal. No respiratory distress.      Breath sounds: Normal breath sounds. No rales.   Chest:      Chest wall: No tenderness.   Abdominal:      General: Bowel sounds are normal. There is no distension.      Palpations: Abdomen is soft.      Tenderness: There is no abdominal tenderness. There is no guarding or rebound.   Musculoskeletal:         General: Swelling present. No tenderness. Normal range of motion.      Cervical back: Normal range of motion and neck supple.      Right lower leg: Edema present.      Left lower leg: Edema present.   Skin:     General: Skin is warm.      Coloration: Skin is not jaundiced or pale.   Neurological:      General: No focal deficit present.      Mental Status: She is alert and oriented to person, place, and time. Mental status is at baseline.      Cranial Nerves: No cranial nerve deficit.   Psychiatric:         Mood and Affect: Mood normal.         Behavior: Behavior normal.         Thought Content: Thought content normal.         Judgment: Judgment normal.         Laboratory:  Recent  Labs     04/05/21  1019   WBC 8.4   RBC 5.02   HEMOGLOBIN 15.3   HEMATOCRIT 47.8*   MCV 95.2   MCH 30.5   MCHC 32.0*   RDW 46.7   PLATELETCT 268   MPV 10.1     Recent Labs     04/05/21  1019   SODIUM 141   POTASSIUM 3.3*   CHLORIDE 101   CO2 29   GLUCOSE 110*   BUN 12   CREATININE 0.67   CALCIUM 9.0     Recent Labs     04/05/21  1019   ALTSGPT 27   ASTSGOT 34   ALKPHOSPHAT 70   TBILIRUBIN 0.4   GLUCOSE 110*     Recent Labs     04/05/21  1019   APTT 31.9   INR 1.00     No results for input(s): NTPROBNP in the last 72 hours.      Recent Labs     04/05/21  1019   TROPONINT 182*       Imaging:  DX-CHEST-PORTABLE (1 VIEW)   Final Result      Mild cardiomegaly and possible vascular congestion.            Assessment/Plan:  I anticipate this patient will require at least two midnights for appropriate medical management, necessitating inpatient admission.    * NSTEMI (non-ST elevated myocardial infarction) (HCC)- (present on admission)  Assessment & Plan  For the history of 4-day history of episodic substernal chest pain that is triggered on exertion and resolved with rest  H/O CAD status post stent 2005/RCA IVANIA 2006  Troponin 180s  EDP discussed case with cardiology, respiration started on nitroglycerin and heparin drip  Admit to telemetry  N.p.o. for now  Aspirin and high-dose statin  Nitroglycerin paste  Heparin drip  Trend troponins  Echocardiogram  Cardiology following, appreciate recommendations    Hypertensive emergency- (present on admission)  Assessment & Plan  SBP in the 200s  Complicated by cardiac ischemia, troponin elevated  Started on nitroglycerin per cardiology  Captopril 3 times daily as needed  Labetalol as needed    Hypokalemia  Assessment & Plan  Replete as necessary    Dyslipidemia- (present on admission)  Assessment & Plan  Start high-dose statin    DVT prophylaxis therapeutic Heparin

## 2021-04-05 NOTE — ASSESSMENT & PLAN NOTE
improved  SBP in the 200s on presentation  Complicated by cardiac ischemia, troponin elevated  Started on nitroglycerin per cardiology  Captopril 3 times daily as needed  Labetalol as needed

## 2021-04-05 NOTE — DISCHARGE PLANNING
Care Transition Team Assessment    LSW approached bedside to obtain the information in this assessment. Pt verified accuracy of facesheet. Pt lives alone in an [facility], [facility]. Pt is able to take care of herself and manage her medications. No ACP documents, however, pt states she would like her brother, Milad, to be her decision maker. LSW explained that NOK, son Dami Viera, would legally be decision maker in emergencies until ACP docs are completed and notarized. Pt verbalized understanding, ACP packet provided to pt by this LSW for pt to work on while in the hospital.     Pt would like Dami removed from her emergency contacts and would not like any information released to him if he calls. LSW removed from emergency contact, inquired if there is anyone else we can add for emergencies. Pt would like her brother, Milad Morris, as a contact, however, she will not be able to obtain his number until tonight. LSW advised pt to notify us and provide us with number once obtained. Other contact she would like listed is neighbor/friend, Shira (unknown last name) [phone]. LSW added to demographics.       Pt has SCP for insurance and has prescription coverage. Pt uses the [employer] for her prescriptions. Pt will need assistance with ride back to [facility] upon d/c.     All questions and needs addressed at this time. Pt will need f/u while in the hospital regarding AD booklet and emergency contact updates.     Information Source  Orientation : Oriented x 4  Information Given By: Patient  Informant's Name: Sania MORENOAngie Adamgs  Who is responsible for making decisions for patient? : Patient    Readmission Evaluation  Is this a readmission?: No    Elopement Risk  Legal Hold: No  Ambulatory or Self Mobile in Wheelchair: No-Not an Elopement Risk    Interdisciplinary Discharge Planning  Lives with - Patient's Self Care Capacity: Alone and Able to Care For Self  Patient or legal guardian wants to designate a  caregiver: No  Support Systems: Family Member(s), Friends / Neighbors  Housing / Facility: Assisted Living Residence  [facility] of Care Facility: [facility]  Prior Services: None  Patient Prefers to be Discharged to:: home  Durable Medical Equipment: Walker(cane as needed. )    Discharge Preparedness  What is your plan after discharge?: Uncertain - pending medical team collaboration  What are your discharge supports?: Sibling, Other (comment)(neighbors)  Prior Functional Level: Independent with Activities of Daily Living, Independent with Medication Management  Difficulity with ADLs: Walking  Difficulty with ADLs Comment: uses walker   Difficulity with IADLs: None    Functional Assesment  Prior Functional Level: Independent with Activities of Daily Living, Independent with Medication Management    Finances  Prescription Coverage: Yes     Advance Directive  Advance Directive?: None  Advance Directive offered?: AD Booklet given    Domestic Abuse  Have you ever been the victim of abuse or violence?: No    Psychological Assessment  History of Substance Abuse: None  History of Psychiatric Problems: No  Non-compliant with Treatment: No  Newly Diagnosed Illness: No    Discharge Risks or Barriers  Discharge risks or barriers?: Complex medical needs  Patient risk factors: Vulnerable adult    Anticipated Discharge Information  Discharge Disposition: Still a Patient (30)  Discharge Address: [address], [place] [address]  Discharge Contact Phone Number: [phone]

## 2021-04-06 ENCOUNTER — APPOINTMENT (OUTPATIENT)
Dept: RADIOLOGY | Facility: MEDICAL CENTER | Age: 76
DRG: 247 | End: 2021-04-06
Attending: STUDENT IN AN ORGANIZED HEALTH CARE EDUCATION/TRAINING PROGRAM
Payer: MEDICARE

## 2021-04-06 ENCOUNTER — APPOINTMENT (OUTPATIENT)
Dept: CARDIOLOGY | Facility: MEDICAL CENTER | Age: 76
DRG: 247 | End: 2021-04-06
Attending: INTERNAL MEDICINE
Payer: MEDICARE

## 2021-04-06 LAB
ALBUMIN SERPL BCP-MCNC: 3.5 G/DL (ref 3.2–4.9)
ALBUMIN SERPL BCP-MCNC: 3.6 G/DL (ref 3.2–4.9)
ALBUMIN/GLOB SERPL: 1 G/DL
ALBUMIN/GLOB SERPL: 1.1 G/DL
ALP SERPL-CCNC: 65 U/L (ref 30–99)
ALP SERPL-CCNC: 66 U/L (ref 30–99)
ALT SERPL-CCNC: 20 U/L (ref 2–50)
ALT SERPL-CCNC: 24 U/L (ref 2–50)
ANION GAP SERPL CALC-SCNC: 10 MMOL/L (ref 7–16)
ANION GAP SERPL CALC-SCNC: 12 MMOL/L (ref 7–16)
APTT PPP: 87.4 SEC (ref 24.7–36)
AST SERPL-CCNC: 41 U/L (ref 12–45)
AST SERPL-CCNC: 43 U/L (ref 12–45)
BASOPHILS # BLD AUTO: 0.3 % (ref 0–1.8)
BASOPHILS # BLD AUTO: 0.4 % (ref 0–1.8)
BASOPHILS # BLD: 0.03 K/UL (ref 0–0.12)
BASOPHILS # BLD: 0.04 K/UL (ref 0–0.12)
BILIRUB SERPL-MCNC: 0.4 MG/DL (ref 0.1–1.5)
BILIRUB SERPL-MCNC: 0.5 MG/DL (ref 0.1–1.5)
BUN SERPL-MCNC: 10 MG/DL (ref 8–22)
BUN SERPL-MCNC: 10 MG/DL (ref 8–22)
CALCIUM SERPL-MCNC: 9 MG/DL (ref 8.5–10.5)
CALCIUM SERPL-MCNC: 9 MG/DL (ref 8.5–10.5)
CHLORIDE SERPL-SCNC: 102 MMOL/L (ref 96–112)
CHLORIDE SERPL-SCNC: 102 MMOL/L (ref 96–112)
CHOLEST SERPL-MCNC: 173 MG/DL (ref 100–199)
CO2 SERPL-SCNC: 26 MMOL/L (ref 20–33)
CO2 SERPL-SCNC: 26 MMOL/L (ref 20–33)
CREAT SERPL-MCNC: 0.67 MG/DL (ref 0.5–1.4)
CREAT SERPL-MCNC: 0.79 MG/DL (ref 0.5–1.4)
EOSINOPHIL # BLD AUTO: 0.02 K/UL (ref 0–0.51)
EOSINOPHIL # BLD AUTO: 0.13 K/UL (ref 0–0.51)
EOSINOPHIL NFR BLD: 0.2 % (ref 0–6.9)
EOSINOPHIL NFR BLD: 1.3 % (ref 0–6.9)
ERYTHROCYTE [DISTWIDTH] IN BLOOD BY AUTOMATED COUNT: 47.5 FL (ref 35.9–50)
ERYTHROCYTE [DISTWIDTH] IN BLOOD BY AUTOMATED COUNT: 48.3 FL (ref 35.9–50)
GLOBULIN SER CALC-MCNC: 3.4 G/DL (ref 1.9–3.5)
GLOBULIN SER CALC-MCNC: 3.4 G/DL (ref 1.9–3.5)
GLUCOSE SERPL-MCNC: 101 MG/DL (ref 65–99)
GLUCOSE SERPL-MCNC: 110 MG/DL (ref 65–99)
HCT VFR BLD AUTO: 43.8 % (ref 37–47)
HCT VFR BLD AUTO: 45.1 % (ref 37–47)
HDLC SERPL-MCNC: 34 MG/DL
HGB BLD-MCNC: 14.3 G/DL (ref 12–16)
HGB BLD-MCNC: 14.4 G/DL (ref 12–16)
IMM GRANULOCYTES # BLD AUTO: 0.04 K/UL (ref 0–0.11)
IMM GRANULOCYTES # BLD AUTO: 0.06 K/UL (ref 0–0.11)
IMM GRANULOCYTES NFR BLD AUTO: 0.4 % (ref 0–0.9)
IMM GRANULOCYTES NFR BLD AUTO: 0.6 % (ref 0–0.9)
INR PPP: 1.06 (ref 0.87–1.13)
LDLC SERPL CALC-MCNC: 114 MG/DL
LYMPHOCYTES # BLD AUTO: 2.01 K/UL (ref 1–4.8)
LYMPHOCYTES # BLD AUTO: 2.59 K/UL (ref 1–4.8)
LYMPHOCYTES NFR BLD: 20.1 % (ref 22–41)
LYMPHOCYTES NFR BLD: 26.3 % (ref 22–41)
MCH RBC QN AUTO: 30.8 PG (ref 27–33)
MCH RBC QN AUTO: 31.2 PG (ref 27–33)
MCHC RBC AUTO-ENTMCNC: 31.7 G/DL (ref 33.6–35)
MCHC RBC AUTO-ENTMCNC: 32.9 G/DL (ref 33.6–35)
MCV RBC AUTO: 95 FL (ref 81.4–97.8)
MCV RBC AUTO: 97 FL (ref 81.4–97.8)
MONOCYTES # BLD AUTO: 1.05 K/UL (ref 0–0.85)
MONOCYTES # BLD AUTO: 1.06 K/UL (ref 0–0.85)
MONOCYTES NFR BLD AUTO: 10.6 % (ref 0–13.4)
MONOCYTES NFR BLD AUTO: 10.7 % (ref 0–13.4)
NEUTROPHILS # BLD AUTO: 6 K/UL (ref 2–7.15)
NEUTROPHILS # BLD AUTO: 6.8 K/UL (ref 2–7.15)
NEUTROPHILS NFR BLD: 60.9 % (ref 44–72)
NEUTROPHILS NFR BLD: 68.2 % (ref 44–72)
NRBC # BLD AUTO: 0 K/UL
NRBC # BLD AUTO: 0 K/UL
NRBC BLD-RTO: 0 /100 WBC
NRBC BLD-RTO: 0 /100 WBC
PLATELET # BLD AUTO: 248 K/UL (ref 164–446)
PLATELET # BLD AUTO: 263 K/UL (ref 164–446)
PMV BLD AUTO: 10.2 FL (ref 9–12.9)
PMV BLD AUTO: 10.7 FL (ref 9–12.9)
POTASSIUM SERPL-SCNC: 3.9 MMOL/L (ref 3.6–5.5)
POTASSIUM SERPL-SCNC: 4 MMOL/L (ref 3.6–5.5)
PROT SERPL-MCNC: 6.9 G/DL (ref 6–8.2)
PROT SERPL-MCNC: 7 G/DL (ref 6–8.2)
PROTHROMBIN TIME: 14.1 SEC (ref 12–14.6)
RBC # BLD AUTO: 4.61 M/UL (ref 4.2–5.4)
RBC # BLD AUTO: 4.65 M/UL (ref 4.2–5.4)
SODIUM SERPL-SCNC: 138 MMOL/L (ref 135–145)
SODIUM SERPL-SCNC: 140 MMOL/L (ref 135–145)
TRIGL SERPL-MCNC: 123 MG/DL (ref 0–149)
TROPONIN T SERPL-MCNC: 279 NG/L (ref 6–19)
TROPONIN T SERPL-MCNC: 298 NG/L (ref 6–19)
TROPONIN T SERPL-MCNC: 309 NG/L (ref 6–19)
TROPONIN T SERPL-MCNC: 335 NG/L (ref 6–19)
TSH SERPL DL<=0.005 MIU/L-ACNC: 2.53 UIU/ML (ref 0.38–5.33)
UFH PPP CHRO-ACNC: 0.29 IU/ML
UFH PPP CHRO-ACNC: 0.52 IU/ML
UFH PPP CHRO-ACNC: <0.1 IU/ML
WBC # BLD AUTO: 10 K/UL (ref 4.8–10.8)
WBC # BLD AUTO: 9.9 K/UL (ref 4.8–10.8)

## 2021-04-06 PROCEDURE — 85610 PROTHROMBIN TIME: CPT

## 2021-04-06 PROCEDURE — A9270 NON-COVERED ITEM OR SERVICE: HCPCS | Performed by: STUDENT IN AN ORGANIZED HEALTH CARE EDUCATION/TRAINING PROGRAM

## 2021-04-06 PROCEDURE — 700105 HCHG RX REV CODE 258: Performed by: INTERNAL MEDICINE

## 2021-04-06 PROCEDURE — 36415 COLL VENOUS BLD VENIPUNCTURE: CPT

## 2021-04-06 PROCEDURE — 80061 LIPID PANEL: CPT

## 2021-04-06 PROCEDURE — 700102 HCHG RX REV CODE 250 W/ 637 OVERRIDE(OP): Performed by: NURSE PRACTITIONER

## 2021-04-06 PROCEDURE — 700111 HCHG RX REV CODE 636 W/ 250 OVERRIDE (IP): Performed by: STUDENT IN AN ORGANIZED HEALTH CARE EDUCATION/TRAINING PROGRAM

## 2021-04-06 PROCEDURE — 700111 HCHG RX REV CODE 636 W/ 250 OVERRIDE (IP): Performed by: EMERGENCY MEDICINE

## 2021-04-06 PROCEDURE — 84484 ASSAY OF TROPONIN QUANT: CPT

## 2021-04-06 PROCEDURE — 85730 THROMBOPLASTIN TIME PARTIAL: CPT

## 2021-04-06 PROCEDURE — 99233 SBSQ HOSP IP/OBS HIGH 50: CPT | Performed by: INTERNAL MEDICINE

## 2021-04-06 PROCEDURE — 700102 HCHG RX REV CODE 250 W/ 637 OVERRIDE(OP): Performed by: STUDENT IN AN ORGANIZED HEALTH CARE EDUCATION/TRAINING PROGRAM

## 2021-04-06 PROCEDURE — 85025 COMPLETE CBC W/AUTO DIFF WBC: CPT

## 2021-04-06 PROCEDURE — 700111 HCHG RX REV CODE 636 W/ 250 OVERRIDE (IP)

## 2021-04-06 PROCEDURE — A9270 NON-COVERED ITEM OR SERVICE: HCPCS | Performed by: INTERNAL MEDICINE

## 2021-04-06 PROCEDURE — 770020 HCHG ROOM/CARE - TELE (206)

## 2021-04-06 PROCEDURE — 306565 RIGID MIT RESTRAINT(PAIR): Performed by: FAMILY MEDICINE

## 2021-04-06 PROCEDURE — 80053 COMPREHEN METABOLIC PANEL: CPT

## 2021-04-06 PROCEDURE — A9270 NON-COVERED ITEM OR SERVICE: HCPCS | Performed by: NURSE PRACTITIONER

## 2021-04-06 PROCEDURE — 85520 HEPARIN ASSAY: CPT

## 2021-04-06 PROCEDURE — 93005 ELECTROCARDIOGRAM TRACING: CPT | Performed by: FAMILY MEDICINE

## 2021-04-06 PROCEDURE — 700111 HCHG RX REV CODE 636 W/ 250 OVERRIDE (IP): Performed by: NURSE PRACTITIONER

## 2021-04-06 PROCEDURE — 84443 ASSAY THYROID STIM HORMONE: CPT

## 2021-04-06 PROCEDURE — 700102 HCHG RX REV CODE 250 W/ 637 OVERRIDE(OP): Performed by: INTERNAL MEDICINE

## 2021-04-06 PROCEDURE — 70450 CT HEAD/BRAIN W/O DYE: CPT | Mod: MG

## 2021-04-06 RX ORDER — DIPHENHYDRAMINE HCL 25 MG
50 TABLET ORAL ONCE
Status: DISPENSED | OUTPATIENT
Start: 2021-04-06 | End: 2021-04-07

## 2021-04-06 RX ORDER — HALOPERIDOL 5 MG/ML
INJECTION INTRAMUSCULAR
Status: COMPLETED
Start: 2021-04-06 | End: 2021-04-06

## 2021-04-06 RX ORDER — LORAZEPAM 2 MG/ML
1 INJECTION INTRAMUSCULAR ONCE
Status: DISPENSED | OUTPATIENT
Start: 2021-04-06 | End: 2021-04-07

## 2021-04-06 RX ORDER — PREDNISONE 50 MG/1
50 TABLET ORAL EVERY 6 HOURS
Status: DISPENSED | OUTPATIENT
Start: 2021-04-06 | End: 2021-04-07

## 2021-04-06 RX ORDER — LORAZEPAM 2 MG/ML
.5-1 INJECTION INTRAMUSCULAR
Status: DISCONTINUED | OUTPATIENT
Start: 2021-04-06 | End: 2021-04-06

## 2021-04-06 RX ORDER — CARVEDILOL 12.5 MG/1
12.5 TABLET ORAL 2 TIMES DAILY WITH MEALS
Status: DISCONTINUED | OUTPATIENT
Start: 2021-04-06 | End: 2021-04-09 | Stop reason: HOSPADM

## 2021-04-06 RX ORDER — LORAZEPAM 2 MG/ML
1-2 INJECTION INTRAMUSCULAR
Status: DISCONTINUED | OUTPATIENT
Start: 2021-04-06 | End: 2021-04-09 | Stop reason: HOSPADM

## 2021-04-06 RX ORDER — HALOPERIDOL 5 MG/ML
5 INJECTION INTRAMUSCULAR ONCE
Status: DISCONTINUED | OUTPATIENT
Start: 2021-04-06 | End: 2021-04-06

## 2021-04-06 RX ORDER — HALOPERIDOL 5 MG/ML
5 INJECTION INTRAMUSCULAR ONCE
Status: COMPLETED | OUTPATIENT
Start: 2021-04-06 | End: 2021-04-06

## 2021-04-06 RX ORDER — HALOPERIDOL 5 MG/ML
1 INJECTION INTRAMUSCULAR EVERY 6 HOURS PRN
Status: DISCONTINUED | OUTPATIENT
Start: 2021-04-06 | End: 2021-04-09 | Stop reason: HOSPADM

## 2021-04-06 RX ORDER — ISOSORBIDE MONONITRATE 30 MG/1
30 TABLET, EXTENDED RELEASE ORAL
Status: DISCONTINUED | OUTPATIENT
Start: 2021-04-06 | End: 2021-04-07

## 2021-04-06 RX ORDER — LISINOPRIL 10 MG/1
10 TABLET ORAL
Status: DISCONTINUED | OUTPATIENT
Start: 2021-04-06 | End: 2021-04-09 | Stop reason: HOSPADM

## 2021-04-06 RX ADMIN — HALOPERIDOL 5 MG: 5 INJECTION INTRAMUSCULAR at 14:39

## 2021-04-06 RX ADMIN — LORAZEPAM 2 MG: 2 INJECTION INTRAMUSCULAR; INTRAVENOUS at 14:57

## 2021-04-06 RX ADMIN — CAPTOPRIL 12.5 MG: 12.5 TABLET ORAL at 00:16

## 2021-04-06 RX ADMIN — SODIUM CHLORIDE: 9 INJECTION, SOLUTION INTRAVENOUS at 00:49

## 2021-04-06 RX ADMIN — ASPIRIN 81 MG: 81 TABLET, CHEWABLE ORAL at 05:29

## 2021-04-06 RX ADMIN — CARVEDILOL 12.5 MG: 12.5 TABLET, FILM COATED ORAL at 21:10

## 2021-04-06 RX ADMIN — DOCUSATE SODIUM 50 MG AND SENNOSIDES 8.6 MG 2 TABLET: 8.6; 5 TABLET, FILM COATED ORAL at 05:29

## 2021-04-06 RX ADMIN — HEPARIN SODIUM 16 UNITS/KG/HR: 5000 INJECTION, SOLUTION INTRAVENOUS at 09:54

## 2021-04-06 RX ADMIN — HALOPERIDOL LACTATE 1 MG: 5 INJECTION, SOLUTION INTRAMUSCULAR at 21:30

## 2021-04-06 RX ADMIN — HEPARIN SODIUM 20 UNITS/KG/HR: 5000 INJECTION, SOLUTION INTRAVENOUS at 23:41

## 2021-04-06 RX ADMIN — SODIUM CHLORIDE: 9 INJECTION, SOLUTION INTRAVENOUS at 09:53

## 2021-04-06 RX ADMIN — HEPARIN SODIUM 2000 UNITS: 1000 INJECTION, SOLUTION INTRAVENOUS; SUBCUTANEOUS at 23:40

## 2021-04-06 RX ADMIN — HEPARIN SODIUM 2000 UNITS: 1000 INJECTION, SOLUTION INTRAVENOUS; SUBCUTANEOUS at 02:28

## 2021-04-06 RX ADMIN — PREDNISONE 50 MG: 50 TABLET ORAL at 21:10

## 2021-04-06 RX ADMIN — HEPARIN SODIUM 16 UNITS/KG/HR: 5000 INJECTION, SOLUTION INTRAVENOUS at 02:29

## 2021-04-06 RX ADMIN — LISINOPRIL 10 MG: 10 TABLET ORAL at 21:12

## 2021-04-06 RX ADMIN — HALOPERIDOL LACTATE 5 MG: 5 INJECTION, SOLUTION INTRAMUSCULAR at 14:39

## 2021-04-06 RX ADMIN — ISOSORBIDE MONONITRATE 30 MG: 30 TABLET, EXTENDED RELEASE ORAL at 21:09

## 2021-04-06 RX ADMIN — METOPROLOL TARTRATE 25 MG: 25 TABLET, FILM COATED ORAL at 05:29

## 2021-04-06 RX ADMIN — CAPTOPRIL 12.5 MG: 12.5 TABLET ORAL at 09:44

## 2021-04-06 ASSESSMENT — COPD QUESTIONNAIRES
HAVE YOU SMOKED AT LEAST 100 CIGARETTES IN YOUR ENTIRE LIFE: NO/DON'T KNOW
COPD SCREENING SCORE: 2
DO YOU EVER COUGH UP ANY MUCUS OR PHLEGM?: NO/ONLY WITH OCCASIONAL COLDS OR INFECTIONS
DURING THE PAST 4 WEEKS HOW MUCH DID YOU FEEL SHORT OF BREATH: NONE/LITTLE OF THE TIME

## 2021-04-06 ASSESSMENT — ENCOUNTER SYMPTOMS
PALPITATIONS: 0
FEVER: 0
DIZZINESS: 0
BLOOD IN STOOL: 0
ABDOMINAL PAIN: 0
SHORTNESS OF BREATH: 0
CHEST TIGHTNESS: 0

## 2021-04-06 ASSESSMENT — PAIN DESCRIPTION - PAIN TYPE
TYPE: ACUTE PAIN
TYPE: ACUTE PAIN

## 2021-04-06 NOTE — PROGRESS NOTES
Cardiology Follow Up Progress Note    Date of Service  4/6/2021    Attending Physician  Rehan Lovelace M.D.    Chief Complaint   Chest pain and LOVETT.    HPI  Sania Viera is a 75 y.o. female admitted 4/5/2021 with chest discomfort that radiated to her jaw and left arm. Patient was found to be significantly hypertensive with SBP of 201, EKG showed diffuse ST changes concerning for ischemia with elevated high sensitivity troponin of 182>257.     Past medical history significant for CAD with stent to the OM in 2006 with known mild to moderate residual disease in the LAD and RCA, HTN, HLD, ETOH use, former smoker and breast cancer S/P surgery and radiation on the right breast (was supposed to have chemo but was unable due to transportation issues).     Interim Events  Patient with some confusion and anxiousness, CT head pending.   NSR on tele with no ectopy.  Currently denies chest pain.   Plan is for LHC in am after premedicated for her iodine allergy.     CBC and chem panel remains stable.      Review of Systems  Review of Systems   Unable to perform ROS: Mental status change   Constitutional: Negative for fever.   Respiratory: Negative for chest tightness and shortness of breath.    Cardiovascular: Negative for chest pain, palpitations and leg swelling.   Gastrointestinal: Negative for abdominal pain and blood in stool.   Genitourinary: Negative for hematuria.   Musculoskeletal: Negative for gait problem.   Neurological: Negative for dizziness and syncope.   All other systems reviewed and are negative.      Vital signs in last 24 hours  Temp:  [36.1 °C (96.9 °F)-37.2 °C (98.9 °F)] 36.4 °C (97.6 °F)  Pulse:  [64-82] 65  Resp:  [16-20] 18  BP: (139-203)/() 168/103  SpO2:  [89 %-94 %] 92 %    Physical Exam  Physical Exam  Constitutional:       General: She is not in acute distress.     Appearance: Normal appearance.   HENT:      Head: Normocephalic.   Cardiovascular:      Rate and Rhythm: Normal rate and  regular rhythm.      Pulses: Normal pulses.      Heart sounds: Normal heart sounds.   Pulmonary:      Effort: Pulmonary effort is normal.      Breath sounds: Normal breath sounds.   Abdominal:      Palpations: Abdomen is soft.      Tenderness: There is no abdominal tenderness.   Musculoskeletal:      Cervical back: Normal range of motion.      Right lower leg: Edema (very trace pitting) present.      Left lower leg: Edema (very trace pitting) present.   Skin:     General: Skin is warm and dry.   Neurological:      Mental Status: She is alert. She is confused.      Comments: Able to answer regular orientation questions accurately but is inappropriate with answers to other questions.   Psychiatric:         Mood and Affect: Mood is anxious.         Behavior: Behavior normal.         Thought Content: Thought content normal.         Cognition and Memory: Cognition is impaired. Memory is impaired.         Judgment: Judgment is impulsive.         Lab Review  Lab Results   Component Value Date/Time    WBC 9.9 04/06/2021 01:16 AM    RBC 4.61 04/06/2021 01:16 AM    HEMOGLOBIN 14.4 04/06/2021 01:16 AM    HEMATOCRIT 43.8 04/06/2021 01:16 AM    MCV 95.0 04/06/2021 01:16 AM    MCH 31.2 04/06/2021 01:16 AM    MCHC 32.9 (L) 04/06/2021 01:16 AM    MPV 10.2 04/06/2021 01:16 AM      Lab Results   Component Value Date/Time    SODIUM 140 04/06/2021 01:16 AM    POTASSIUM 4.0 04/06/2021 01:16 AM    CHLORIDE 102 04/06/2021 01:16 AM    CO2 26 04/06/2021 01:16 AM    GLUCOSE 110 (H) 04/06/2021 01:16 AM    BUN 10 04/06/2021 01:16 AM    CREATININE 0.67 04/06/2021 01:16 AM      Lab Results   Component Value Date/Time    ASTSGOT 43 04/06/2021 01:16 AM    ALTSGPT 24 04/06/2021 01:16 AM     Lab Results   Component Value Date/Time    CHOLSTRLTOT 173 04/06/2021 01:16 AM     (H) 04/06/2021 01:16 AM    HDL 34 (A) 04/06/2021 01:16 AM    TRIGLYCERIDE 123 04/06/2021 01:16 AM    TROPONINT 279 (H) 04/06/2021 05:55 AM       Recent Labs      04/05/21  1729   NTPROBNP 2276*       Assessment/Plan  NSTEMI:  - Currently chest pain free.   - Trop trending down with a peak of 298.   - Continue hep gtt per weight based protocol.  - TTE ordered and pending.  - Start IMDUR 30 mg daily.   - Continue Metoprolol 25 mg BID.   - NPO at midnight for LHC, patient will be premedicated for her iodine allergy with prednisone and benadryl prior.     HTN:  - Remains poorly controlled.   - IMDUR started as above.  - Lisinopril 10 mg daily started.   - Continue BB as above.     CAD:  - LHC in am as above.  - Continue ASA 81 mg daily and statin therapy.     HLD:  - , goal is <70.  - High intensity Atorvastatin 80 mg daily started this admission.    Mild Confusion:  - Denies any recent ETOH use.  - CT of head ordered and pending.  - May have to postpone LHC in am if patient remains confused.   - Primary medical team following.     Thank you for allowing me to participate in the care of this patient.    I will continue to follow this patient.     Please contact me with any questions.    FRANCIA Mckoy.   Cardiology, [facility]  [phone]

## 2021-04-06 NOTE — PROGRESS NOTES
Attended round with the resident  for patient with mentality changed. Pt is more confused and is anxious about getting her heart cath done today. Pt could not remember that I was her nurse since 7am nor she remember that he is currently on a heparin gtt.  She stated that no heparin is going into her.  Pt was caught wandering in the [address] twice and didn't know what is going on and would like to find out when she is getting her heart catheterization done.  Informed the resident that I am concerned that she might have some slight bleed to the head since pt is on heparin gtt and pt might benefit from getting a head CT and also informed the resident that pt's blood pressure has been elevated since this am.  178/104 and captopril 12.5mg oral given at 944am and rechecked it with 168/103 at 11.27am.

## 2021-04-06 NOTE — PROGRESS NOTES
"Progress Note:    I was called to bedside by her nurse, Humaira, as the patient was acting confused and stated that she wanted to leave. She had previously pulled out her IV's. Patient told me that she was being held her and that she wanted to go home. She states that she doesn't want to do what the doctors are telling her to do and that she \"wants to go home and think about if I need the procedure or not. I only came here to get medications.\" When asking how she got to the hospital and what for yesterday, she replied that \"I drove myself here for a medical seminar with lots of doctors where they were serving a big meal with potatoes, like a party. There I talked to many doctors and they sent me here to get medications for my toe.\" (Medical record review shows she arrived via EMS for chest pain, and patient was able to tell me this herself this morning before she became altered). She then stated that she wanted to go home to see her dog.  When we explained to her that she was admitted to the hospital yesterday for a heart attack and that she needs a heart cath today with possible stents placed she replied that she doesn't want this because she wants to go home. When asked what year it was, she replied \"1947,\" and then she would start laughing. We explained to her that it was very important for her to stay for her health. Patient was told that she was going to have to stay here, and she started to become combative towards staff members when nursing staff was trying to help her get back into her bed.    Assessment:  Patient was determined to be lacking capacity because she was globally unaware of why she was here. I discussed with my attending physician and the nursing staff who agreed. I quickly curbsided psychiatry who agreed with placing the patient under a medical legal hold. Psychiatry agreed to consult and a consult order was placed. Patient was treated with haldol 5mg IM and patient had little to no response to " this. Patient was kicking staff members, so restraints were placed. Patient was then treated with Ativan 2mg IM and this was adequate to sedate the patient. IV access was obtained and patient was restarted on her heparin.     CT head, CBC, CMP, UA, UDS were ordered to work up her acute delirium.   Ativan and haldol PRN overnight for agitation.  Psychiatry consult

## 2021-04-06 NOTE — PROGRESS NOTES
[facility] [place] [facility] PROGRESS NOTE     Attending: Dr. Jabari Díaz    Resident: Dr. Yajaira Rincon    PATIENT: Sania Viera; [id number]; 1945 Hospital Day: 2    75 y.o. female with PMHx CAD s/p IVANIA to OM 2006, breast cancer s/p removal and radiation 2006, dyslipidemia, HTN admitted for evaluation and management of NSTEMI and hypertensive emergency.      Presenting symptoms include 4 days of chest discomfort and pain that initiated in her right ear and jaw and progressed to her left arm. In the ED patient was hypertensive to 200's SBP, troponins were elevated to 182 and continued to trend upward to 257->298, EKG showed diffuse ST segment depression. Cardiology was consulted and recommended NTG and heparin drip before ECHO and  LHC during this hospitalization.    SUBJECTIVE: No acute events overnight, this morning Ms. Viera was pleasant and denied any CP noting that it had completely resolved. No nausea, diaphoresis, HA, SOB, swelling. No telemetry events overnight.     OBJECTIVE:     Vitals:    04/06/21 0240 04/06/21 0453 04/06/21 0733 04/06/21 1110   BP: 139/89 157/91 (!) 178/104 (!) 168/103   Pulse: 77 71 64 65   Resp: 16 16 18 18   Temp: 36.4 °C (97.6 °F) 36.1 °C (97 °F) 36.4 °C (97.6 °F) 36.4 °C (97.6 °F)   TempSrc: Temporal Temporal Temporal Temporal   SpO2: 93% 94% 93% 92%   Weight:       Height:         No intake or output data in the 24 hours ending 04/06/21 1435    PE:  General: No acute distress, resting comfortably sitting on edge of bed.   HEENT: NC/AT. PERRLA. EOMI. MMM  Cardiovascular: RRR with no murmurs, rubs, or gallops  Respiratory: Symmetric expansion. CTAB with no crackles or wheezing  Abdomen: Soft, non tender, non distended, no masses, bowel sounds present  EXT:  Moves all extremities, no cyanosis, clubbing, or edema. Pulses 2+ and present in all 4  Neuro: non focal, alert and oriented to person, place, situation, and time.   Skin: In tact without lesions, rashes or appreciable  jaundice  Psych: Appropriate affect this morning      LABS:  Recent Labs     04/05/21  1019 04/06/21  0116   WBC 8.4 9.9   RBC 5.02 4.61   HEMOGLOBIN 15.3 14.4   HEMATOCRIT 47.8* 43.8   MCV 95.2 95.0   MCH 30.5 31.2   RDW 46.7 47.5   PLATELETCT 268 248   MPV 10.1 10.2   NEUTSPOLYS 63.00 60.90   LYMPHOCYTES 25.30 26.30   MONOCYTES 10.20 10.70   EOSINOPHILS 0.70 1.30   BASOPHILS 0.40 0.40     Recent Labs     04/05/21  1019 04/06/21  0116   SODIUM 141 140   POTASSIUM 3.3* 4.0   CHLORIDE 101 102   CO2 29 26   BUN 12 10   CREATININE 0.67 0.67   CALCIUM 9.0 9.0   ALBUMIN 3.8 3.5     Estimated GFR/CRCL = Estimated Creatinine Clearance: 93.9 mL/min (by C-G formula based on SCr of 0.67 mg/dL).  Recent Labs     04/05/21  1019 04/06/21  0116   GLUCOSE 110* 110*     Recent Labs     04/05/21  1019 04/06/21  0116   ASTSGOT 34 43   ALTSGPT 27 24   TBILIRUBIN 0.4 0.4   ALKPHOSPHAT 70 65   GLOBULIN 3.7* 3.4   INR 1.00 1.06             Recent Labs     04/05/21  1019 04/06/21  0116   INR 1.00 1.06   APTT 31.9 87.4*       IMAGING:   DX-CHEST-PORTABLE (1 VIEW)   Final Result      Mild cardiomegaly and possible vascular congestion.      EC-ECHOCARDIOGRAM COMPLETE W/O CONT    (Results Pending)   CL-LEFT HEART CATHETERIZATION WITH POSSIBLE INTERVENTION    (Results Pending)   CT-HEAD W/O    (Results Pending)         MEDS:  Current Facility-Administered Medications   Medication Last Admin   • HALOPERIDOL LACTATE 5 MG/ML INJ SOLN     • lisinopril (PRINIVIL) tablet 10 mg     • predniSONE (DELTASONE) tablet 50 mg      And   • diphenhydrAMINE (BENADRYL) tablet/capsule 50 mg     • isosorbide mononitrate SR (IMDUR) tablet 30 mg     • haloperidol lactate (HALDOL) injection 5 mg     • LORazepam (ATIVAN) injection 0.5-1 mg     • heparin infusion 25,000 units in 500 mL 0.45% NACL 16 Units/kg/hr at 04/06/21 0925   • heparin injection 2,000 Units 2,000 Units at 04/06/21 0228   • senna-docusate (PERICOLACE or SENOKOT S) 8.6-50 MG per tablet 2 tablet 2  tablet at 04/06/21 0529    And   • polyethylene glycol/lytes (MIRALAX) PACKET 1 Packet      And   • magnesium hydroxide (MILK OF MAGNESIA) suspension 30 mL      And   • bisacodyl (DULCOLAX) suppository 10 mg     • Respiratory Therapy Consult     • acetaminophen (Tylenol) tablet 650 mg     • labetalol (NORMODYNE/TRANDATE) injection 10 mg 10 mg at 04/05/21 1322   • ondansetron (ZOFRAN) syringe/vial injection 4 mg     • ondansetron (ZOFRAN ODT) dispertab 4 mg     • aspirin (ASA) chewable tab 81 mg 81 mg at 04/06/21 0529   • captopril (CAPOTEN) tablet 12.5 mg 12.5 mg at 04/06/21 0944   • atorvastatin (LIPITOR) tablet 80 mg 80 mg at 04/05/21 1322   • metoprolol tartrate (LOPRESSOR) tablet 25 mg 25 mg at 04/06/21 0529   • NS infusion New Bag at 04/06/21 0953   • lidocaine (XYLOCAINE) 1 % injection 0.5 mL         ASSESSMENT/PLAN: 75 y.o. female with PMHx CAD s/p IVANIA to OM 2006, breast cancer s/p removal and radiation 2006, dyslipidemia, tobacco use, HTN admitted for evaluation and management of NSTEMI and hypertensive emergency.        # NSTEMI  Chest pain resolved following administration of NTG. Troponins remain elevated.   Cardiology consulted, appreciate recommendations:  -continue weight based heparin drip  -follow up results of echo done today  -plan for [facility], however will need premedication per protocol with prednisone/diphenhydramine prior to contrast for iodine allergy  -Improve BP control with IMDUR 30 mg daily  -Continue metoprolol 25 mg     # Hypertensive emergency  Lisinopril 10 mg daily and metoprolol 25 mg BID initiated  Pressures remained elevated through the night-largely in the 160's systolic.   Cardiology recommendations:    -initiation of isosorbide mononitrate 30 mg daily     -captopril 12.5 mg q 8 prn for pressures over 160 systolic  -IV labetalol 10 mg q 4 prn for pressures over 185 SBP and 105 DBP  -Follow CBC, CMP    # CAD  # Dyslipidemia  -Continue high dose statin lipitor 80 mg daily    #  Hypokalemia (resolved)  -Daily BMP    Lines: PIV  IVF: NS at 100mL/hr  Abx: none  GI PPx:Multimodal   DVT PPx: heparin drip  Code: full  Diet: cardiac      Dispo: Inpatient for evaluation and management of acute NSTEMI and hypertensive emergency    Yajaira Rincon MD  PGY-1 Family Medicine Resident  [facility]

## 2021-04-06 NOTE — PROGRESS NOTES
Bedside shift report received from FRAN Arana.  Safety check complete.  All patient needs met at this time.  Will continue to monitor.

## 2021-04-06 NOTE — PROGRESS NOTES
Monitor called stating pt was off from her monitor,  went and checked on pt. Pt  Took took out her bilateral IV line and states that it's deep in her and she would like to take them out since it's deep in her. Explained to her that these are iv lines that we need to keep it in so we can give her medications. Pt  States she does not need it and she would like to leave and we have no right to hold her hostage.  Pt appears to be alert and oriented time self and year. Unable to provide  why she is here and how she is here, security called and charged Nidhi has informed.

## 2021-04-07 ENCOUNTER — APPOINTMENT (OUTPATIENT)
Dept: CARDIOLOGY | Facility: MEDICAL CENTER | Age: 76
DRG: 247 | End: 2021-04-07
Attending: INTERNAL MEDICINE
Payer: MEDICARE

## 2021-04-07 LAB
ALBUMIN SERPL BCP-MCNC: 3.6 G/DL (ref 3.2–4.9)
ALBUMIN/GLOB SERPL: 1 G/DL
ALP SERPL-CCNC: 69 U/L (ref 30–99)
ALT SERPL-CCNC: 23 U/L (ref 2–50)
AMPHET UR QL SCN: NEGATIVE
ANION GAP SERPL CALC-SCNC: 9 MMOL/L (ref 7–16)
APPEARANCE UR: CLEAR
AST SERPL-CCNC: 46 U/L (ref 12–45)
BACTERIA #/AREA URNS HPF: NEGATIVE /HPF
BARBITURATES UR QL SCN: NEGATIVE
BASOPHILS # BLD AUTO: 0.3 % (ref 0–1.8)
BASOPHILS # BLD: 0.03 K/UL (ref 0–0.12)
BENZODIAZ UR QL SCN: NEGATIVE
BILIRUB SERPL-MCNC: 0.6 MG/DL (ref 0.1–1.5)
BILIRUB UR QL STRIP.AUTO: NEGATIVE
BUN SERPL-MCNC: 10 MG/DL (ref 8–22)
BZE UR QL SCN: NEGATIVE
CALCIUM SERPL-MCNC: 8.9 MG/DL (ref 8.5–10.5)
CANNABINOIDS UR QL SCN: NEGATIVE
CHLORIDE SERPL-SCNC: 101 MMOL/L (ref 96–112)
CO2 SERPL-SCNC: 26 MMOL/L (ref 20–33)
COLOR UR: YELLOW
CREAT SERPL-MCNC: 0.57 MG/DL (ref 0.5–1.4)
EKG IMPRESSION: NORMAL
EOSINOPHIL # BLD AUTO: 0.09 K/UL (ref 0–0.51)
EOSINOPHIL NFR BLD: 0.9 % (ref 0–6.9)
EPI CELLS #/AREA URNS HPF: NEGATIVE /HPF
ERYTHROCYTE [DISTWIDTH] IN BLOOD BY AUTOMATED COUNT: 46.6 FL (ref 35.9–50)
GLOBULIN SER CALC-MCNC: 3.6 G/DL (ref 1.9–3.5)
GLUCOSE SERPL-MCNC: 116 MG/DL (ref 65–99)
GLUCOSE UR STRIP.AUTO-MCNC: NEGATIVE MG/DL
HCT VFR BLD AUTO: 42.7 % (ref 37–47)
HGB BLD-MCNC: 14.3 G/DL (ref 12–16)
HYALINE CASTS #/AREA URNS LPF: NORMAL /LPF
IMM GRANULOCYTES # BLD AUTO: 0.04 K/UL (ref 0–0.11)
IMM GRANULOCYTES NFR BLD AUTO: 0.4 % (ref 0–0.9)
KETONES UR STRIP.AUTO-MCNC: 15 MG/DL
LEUKOCYTE ESTERASE UR QL STRIP.AUTO: NEGATIVE
LV EJECT FRACT  99904: 45
LV EJECT FRACT MOD 2C 99903: 52.88
LV EJECT FRACT MOD 4C 99902: 45.3
LV EJECT FRACT MOD BP 99901: 49.83
LYMPHOCYTES # BLD AUTO: 2.03 K/UL (ref 1–4.8)
LYMPHOCYTES NFR BLD: 20.2 % (ref 22–41)
MCH RBC QN AUTO: 31.4 PG (ref 27–33)
MCHC RBC AUTO-ENTMCNC: 33.5 G/DL (ref 33.6–35)
MCV RBC AUTO: 93.6 FL (ref 81.4–97.8)
METHADONE UR QL SCN: NEGATIVE
MICRO URNS: ABNORMAL
MONOCYTES # BLD AUTO: 0.94 K/UL (ref 0–0.85)
MONOCYTES NFR BLD AUTO: 9.4 % (ref 0–13.4)
NEUTROPHILS # BLD AUTO: 6.9 K/UL (ref 2–7.15)
NEUTROPHILS NFR BLD: 68.8 % (ref 44–72)
NITRITE UR QL STRIP.AUTO: NEGATIVE
NRBC # BLD AUTO: 0 K/UL
NRBC BLD-RTO: 0 /100 WBC
OPIATES UR QL SCN: NEGATIVE
OXYCODONE UR QL SCN: NEGATIVE
PCP UR QL SCN: NEGATIVE
PH UR STRIP.AUTO: 7 [PH] (ref 5–8)
PLATELET # BLD AUTO: 251 K/UL (ref 164–446)
PMV BLD AUTO: 10 FL (ref 9–12.9)
POTASSIUM SERPL-SCNC: 3.7 MMOL/L (ref 3.6–5.5)
PROPOXYPH UR QL SCN: NEGATIVE
PROT SERPL-MCNC: 7.2 G/DL (ref 6–8.2)
PROT UR QL STRIP: 30 MG/DL
RBC # BLD AUTO: 4.56 M/UL (ref 4.2–5.4)
RBC # URNS HPF: NORMAL /HPF
RBC UR QL AUTO: ABNORMAL
SODIUM SERPL-SCNC: 136 MMOL/L (ref 135–145)
SP GR UR STRIP.AUTO: 1.01
TROPONIN T SERPL-MCNC: 260 NG/L (ref 6–19)
TROPONIN T SERPL-MCNC: 299 NG/L (ref 6–19)
TROPONIN T SERPL-MCNC: 334 NG/L (ref 6–19)
TROPONIN T SERPL-MCNC: 379 NG/L (ref 6–19)
UFH PPP CHRO-ACNC: 0.6 IU/ML
UFH PPP CHRO-ACNC: 0.67 IU/ML
UROBILINOGEN UR STRIP.AUTO-MCNC: 0.2 MG/DL
WBC # BLD AUTO: 10 K/UL (ref 4.8–10.8)
WBC #/AREA URNS HPF: NORMAL /HPF

## 2021-04-07 PROCEDURE — 36415 COLL VENOUS BLD VENIPUNCTURE: CPT

## 2021-04-07 PROCEDURE — A9270 NON-COVERED ITEM OR SERVICE: HCPCS | Performed by: NURSE PRACTITIONER

## 2021-04-07 PROCEDURE — 93010 ELECTROCARDIOGRAM REPORT: CPT | Performed by: INTERNAL MEDICINE

## 2021-04-07 PROCEDURE — A9270 NON-COVERED ITEM OR SERVICE: HCPCS | Performed by: STUDENT IN AN ORGANIZED HEALTH CARE EDUCATION/TRAINING PROGRAM

## 2021-04-07 PROCEDURE — 99222 1ST HOSP IP/OBS MODERATE 55: CPT | Performed by: PSYCHIATRY & NEUROLOGY

## 2021-04-07 PROCEDURE — 700102 HCHG RX REV CODE 250 W/ 637 OVERRIDE(OP): Performed by: NURSE PRACTITIONER

## 2021-04-07 PROCEDURE — 700111 HCHG RX REV CODE 636 W/ 250 OVERRIDE (IP): Performed by: NURSE PRACTITIONER

## 2021-04-07 PROCEDURE — 93306 TTE W/DOPPLER COMPLETE: CPT

## 2021-04-07 PROCEDURE — 99232 SBSQ HOSP IP/OBS MODERATE 35: CPT | Performed by: STUDENT IN AN ORGANIZED HEALTH CARE EDUCATION/TRAINING PROGRAM

## 2021-04-07 PROCEDURE — 80307 DRUG TEST PRSMV CHEM ANLYZR: CPT

## 2021-04-07 PROCEDURE — 84484 ASSAY OF TROPONIN QUANT: CPT | Mod: 91

## 2021-04-07 PROCEDURE — 85520 HEPARIN ASSAY: CPT

## 2021-04-07 PROCEDURE — A9270 NON-COVERED ITEM OR SERVICE: HCPCS | Performed by: INTERNAL MEDICINE

## 2021-04-07 PROCEDURE — 770020 HCHG ROOM/CARE - TELE (206)

## 2021-04-07 PROCEDURE — 700102 HCHG RX REV CODE 250 W/ 637 OVERRIDE(OP): Performed by: INTERNAL MEDICINE

## 2021-04-07 PROCEDURE — 700105 HCHG RX REV CODE 258: Performed by: INTERNAL MEDICINE

## 2021-04-07 PROCEDURE — 93306 TTE W/DOPPLER COMPLETE: CPT | Mod: 26 | Performed by: INTERNAL MEDICINE

## 2021-04-07 PROCEDURE — 80053 COMPREHEN METABOLIC PANEL: CPT

## 2021-04-07 PROCEDURE — 700102 HCHG RX REV CODE 250 W/ 637 OVERRIDE(OP): Performed by: STUDENT IN AN ORGANIZED HEALTH CARE EDUCATION/TRAINING PROGRAM

## 2021-04-07 PROCEDURE — 99233 SBSQ HOSP IP/OBS HIGH 50: CPT | Performed by: INTERNAL MEDICINE

## 2021-04-07 PROCEDURE — 700111 HCHG RX REV CODE 636 W/ 250 OVERRIDE (IP): Performed by: EMERGENCY MEDICINE

## 2021-04-07 PROCEDURE — 85025 COMPLETE CBC W/AUTO DIFF WBC: CPT

## 2021-04-07 PROCEDURE — 81001 URINALYSIS AUTO W/SCOPE: CPT

## 2021-04-07 RX ORDER — ISOSORBIDE MONONITRATE 30 MG/1
30 TABLET, EXTENDED RELEASE ORAL ONCE
Status: COMPLETED | OUTPATIENT
Start: 2021-04-07 | End: 2021-04-07

## 2021-04-07 RX ORDER — DIPHENHYDRAMINE HCL 25 MG
50 TABLET ORAL ONCE
Status: COMPLETED | OUTPATIENT
Start: 2021-04-08 | End: 2021-04-08

## 2021-04-07 RX ORDER — PREDNISONE 50 MG/1
50 TABLET ORAL EVERY 6 HOURS
Status: COMPLETED | OUTPATIENT
Start: 2021-04-07 | End: 2021-04-08

## 2021-04-07 RX ORDER — ISOSORBIDE MONONITRATE 60 MG/1
60 TABLET, EXTENDED RELEASE ORAL
Status: DISCONTINUED | OUTPATIENT
Start: 2021-04-08 | End: 2021-04-09 | Stop reason: HOSPADM

## 2021-04-07 RX ADMIN — CARVEDILOL 12.5 MG: 12.5 TABLET, FILM COATED ORAL at 17:55

## 2021-04-07 RX ADMIN — SODIUM CHLORIDE: 9 INJECTION, SOLUTION INTRAVENOUS at 17:57

## 2021-04-07 RX ADMIN — PREDNISONE 50 MG: 50 TABLET ORAL at 06:24

## 2021-04-07 RX ADMIN — ASPIRIN 81 MG: 81 TABLET, CHEWABLE ORAL at 06:24

## 2021-04-07 RX ADMIN — HEPARIN SODIUM 20 UNITS/KG/HR: 5000 INJECTION, SOLUTION INTRAVENOUS at 08:27

## 2021-04-07 RX ADMIN — SODIUM CHLORIDE: 9 INJECTION, SOLUTION INTRAVENOUS at 00:12

## 2021-04-07 RX ADMIN — PREDNISONE 50 MG: 50 TABLET ORAL at 21:29

## 2021-04-07 RX ADMIN — ISOSORBIDE MONONITRATE 30 MG: 30 TABLET, EXTENDED RELEASE ORAL at 06:24

## 2021-04-07 RX ADMIN — LISINOPRIL 10 MG: 10 TABLET ORAL at 06:24

## 2021-04-07 RX ADMIN — CARVEDILOL 12.5 MG: 12.5 TABLET, FILM COATED ORAL at 07:32

## 2021-04-07 RX ADMIN — ATORVASTATIN CALCIUM 80 MG: 80 TABLET, FILM COATED ORAL at 17:55

## 2021-04-07 RX ADMIN — ISOSORBIDE MONONITRATE 30 MG: 30 TABLET, EXTENDED RELEASE ORAL at 10:03

## 2021-04-07 ASSESSMENT — ENCOUNTER SYMPTOMS
PALPITATIONS: 0
CHILLS: 0
INSOMNIA: 0
FEVER: 0
COUGH: 0
EYE PAIN: 0
BLURRED VISION: 0
NAUSEA: 0
CHEST TIGHTNESS: 0
FOCAL WEAKNESS: 0
HEADACHES: 0
SENSORY CHANGE: 0
BLOOD IN STOOL: 0
SHORTNESS OF BREATH: 0
DIZZINESS: 0
VOMITING: 0
BACK PAIN: 0
ABDOMINAL PAIN: 0

## 2021-04-07 ASSESSMENT — LIFESTYLE VARIABLES: SUBSTANCE_ABUSE: 0

## 2021-04-07 ASSESSMENT — FIBROSIS 4 INDEX: FIB4 SCORE: 2.79

## 2021-04-07 NOTE — CARE PLAN
Problem: Knowledge Deficit  Goal: Knowledge of disease process/condition, treatment plan, diagnostic tests, and medications will improve  Outcome: PROGRESSING SLOWER THAN EXPECTED  Goal: Knowledge of the prescribed therapeutic regimen will improve  Outcome: PROGRESSING SLOWER THAN EXPECTED     Problem: Psychosocial Needs:  Goal: Level of anxiety will decrease  Outcome: PROGRESSING SLOWER THAN EXPECTED     Problem: Safety - Medical Restraint  Goal: Free from restraint(s) (Restraint for Interference with Medical Device)  Description: INTERVENTIONS:  1. ONCE/SHIFT or MINIMUM Q12H: Assess and document the continuing need for restraints  2. Q24H: Continued use of restraint requires LIP to perform face to face examination and written order  3. Identify and implement measures to help patient regain control  Outcome: PROGRESSING SLOWER THAN EXPECTED     Problem: Safety - Medical Restraint  Goal: Remains free of injury from restraints (Restraint for Interference with Medical Device)  Description: INTERVENTIONS:  1. Determine that other, less restrictive measures have been tried or would not be effective before applying the restraint  2. Evaluate the patient's condition at the time of restraint application  3. Inform patient/family regarding the reason for restraint  4. Q2H: Monitor safety, psychosocial status, comfort, nutrition and hydration  Outcome: PROGRESSING AS EXPECTED

## 2021-04-07 NOTE — CARE PLAN
Problem: Communication  Goal: The ability to communicate needs accurately and effectively will improve  4/6/2021 1839 by Quincy Parsons R.N.  Outcome: PROGRESSING AS EXPECTED  4/6/2021 1839 by Quincy Parsons R.N.  Outcome: PROGRESSING AS EXPECTED     Problem: Safety  Goal: Will remain free from injury  4/6/2021 1839 by Quincy Parsons R.N.  Outcome: PROGRESSING AS EXPECTED  4/6/2021 1839 by Quincy Parsons R.N.  Outcome: PROGRESSING AS EXPECTED     Problem: Infection  Goal: Will remain free from infection  4/6/2021 1839 by Quincy Parsons R.N.  Outcome: PROGRESSING AS EXPECTED  4/6/2021 1839 by Quincy Parsons R.N.  Outcome: PROGRESSING AS EXPECTED     Problem: Venous Thromboembolism (VTW)/Deep Vein Thrombosis (DVT) Prevention:  Goal: Patient will participate in Venous Thrombosis (VTE)/Deep Vein Thrombosis (DVT)Prevention Measures  4/6/2021 1839 by Quincy Parsons R.N.  Outcome: PROGRESSING AS EXPECTED  4/6/2021 1839 by Quincy Parsons R.N.  Outcome: PROGRESSING AS EXPECTED     Problem: Bowel/Gastric:  Goal: Normal bowel function is maintained or improved  4/6/2021 1839 by Quincy Parsons R.N.  Outcome: PROGRESSING AS EXPECTED  4/6/2021 1839 by Quincy Parsons R.N.  Outcome: PROGRESSING AS EXPECTED     Problem: Knowledge Deficit  Goal: Knowledge of disease process/condition, treatment plan, diagnostic tests, and medications will improve  Outcome: PROGRESSING AS EXPECTED

## 2021-04-07 NOTE — PROGRESS NOTES
Spiritual Care Note    Patient Information     Patient's Name: Sania Viera   MRN: [id number]    YOB: 1945   Age and Gender: 75 y.o. female   Service Area: [facility]   Room (and Bed): [place] [address]   Ethnicity or Nationality:     Primary Language: English   Mandaen/Spiritual preference: None   Place of Residence: [place]   Family/Friends/Others Present: No   Clinical Team Present: No   Medical Diagnosis(-es)/Procedure(s): NSTEMI   Code Status: Full Code    Date of Admission: 4/5/2021   Length of Stay: 2 days        Spiritual Care Provider Information:  Name of Spiritual Care Provider: Kush Machado  Title of Spiritual Care Provider: Associate   Phone Number: [phone]  E-mail: [email or web address]  Total time : 5 minutes    Spiritual Screen Results:    Gen Nursing  Spiritual Screen  Was spiritual care education provided to the patient?: Yes     Palliative Care  PC Mandaen/Spiritual Screening  Was spiritual care education provided to the patient?: Yes      Encounter/Request Information  Encounter/Request Type   Visited With: Patient, Patient not available  Nature of the Visit: Follow-up, On shift  Continue Visiting: No  General Visit: Yes  Referral From/ Origin of Request: Epic nursing    Religous Needs/Values       Spiritual Assessment   Spiritual Care Encounters  Observations/Symptoms: Thankfulness  Interacton/Conversation:  came for a follow up visit but Pt was asleep.  will try again in the afternoon or on the next morning  Interventions: Compassionate Presence  Outcomes: Ability to Communicate with Truth and Honesty  Plan: Visit Upon Request    Notes:   has been notified of the occurrence that happened on the afternoon of 4/6/21.  will make a follow up visit and try to bring companionship to the Pt.

## 2021-04-07 NOTE — CARE PLAN
Problem: Communication  Goal: The ability to communicate needs accurately and effectively will improve  Outcome: PROGRESSING AS EXPECTED     Problem: Safety  Goal: Will remain free from injury  Outcome: PROGRESSING AS EXPECTED  Goal: Will remain free from falls  Outcome: PROGRESSING AS EXPECTED     Problem: Infection  Goal: Will remain free from infection  Outcome: PROGRESSING AS EXPECTED     Problem: Venous Thromboembolism (VTW)/Deep Vein Thrombosis (DVT) Prevention:  Goal: Patient will participate in Venous Thrombosis (VTE)/Deep Vein Thrombosis (DVT)Prevention Measures  Outcome: PROGRESSING AS EXPECTED     Problem: Bowel/Gastric:  Goal: Normal bowel function is maintained or improved  Outcome: PROGRESSING AS EXPECTED  Goal: Will not experience complications related to bowel motility  Outcome: PROGRESSING AS EXPECTED     Problem: Discharge Barriers/Planning  Goal: Patient's continuum of care needs will be met  Outcome: PROGRESSING AS EXPECTED     Problem: Urinary Elimination:  Goal: Ability to reestablish a normal urinary elimination pattern will improve  Outcome: PROGRESSING AS EXPECTED     Problem: Pain Management  Goal: Pain level will decrease to patient's comfort goal  Outcome: PROGRESSING AS EXPECTED     Problem: Safety - Violent/Self-destructive Restraint  Goal: Remains free of injury from restraints (Restraint for Violent/Self-Destructive Behavior)  Description: INTERVENTIONS:  1. Determine that de-escalation and other, less restrictive measures have been tried or would not be effective before applying the restraint  2. Identify and document the criteria for restraint  3. Evaluate the patient's condition at the time of restraint application  4. Inform patient/family regarding the reason for restraint/seclusion  5. Q2H: Monitor comfort, nutrition and hydration needs  6. Q15M: Perform safety checks including skin, circulation, sensory, respiratory and psychological status  7. Ensure continuous  observation  8. Identify and implement measures to help patient regain control, assess readiness for release and initiate progressive release per policy  4/6/2021 2308 by Edwige Servin R.N.  Outcome: MET    Goal: Free from restraints (Restraint for Violent or Self-Destructive Behavior)  Description: INTERVENTIONS:  1. Verify that a physician or other LIP has performed a face to face examination within one hour of restraint application  2. Assess and document the patient's behavior or symptoms that indicate continued need for restraint, notify LIP and obtain renewal orders as indicated  --Q4H for patient  age 18 or older  --Q2H for patients age 9 through 17  --Q1H for patients age 8 and under  3. Q4h: Verify that an LIP has performed a face to face examination and entered a new order  4. Identify and implement measures to help patient regain control, assess readiness for release and initiate progressive release per policy  4/6/2021 2308 by Edwige Servin R.N.  Outcome: MET

## 2021-04-07 NOTE — CONSULTS
"PSYCHIATRIC INTAKE EVALUATION    *Reason for admission:   Chest pain.  Admitted for NSTEMI            *Reason for consult:  \" Patient wanting to leave, states she came to the hospital for a seminar and for food and hashbrowns, but also saying that she has a heart condition that she could die if she left\"   *Requesting Physician/APN:  Gordon Chiang M.D.        Legal Hold status:  Not on hold        *Chief Complaint:: \" I wanted to check my heart\"     *HPI (includes Psychiatric ROS): Patient was oriented to person, time, place and situation.  She reported she called EMS because she wanted to check her heart.  She was having some heart pains and shortness of breath.  Patient states that the plan is to get a stent tomorrow, and she is in agreement with.  She states that with the stent, she will be able to walk more, be healthier and breathe much better.  Stated that she does not get the stent, she has a chance of having another heart attack.  Stated that at this time the stent is the best treatment for her situation.  Patient stated that she was not taking any medications for the past 2 years due to financial difficulties.  She was not following up with any PCP either.  Upon discharge she would like to get connected with a PCP and continue medication regimen at home.  Patient denies any psychiatric history.  Denies any depressed mood for the past 2 weeks.  Patient reports history of chronic insomnia and taking over-the-counter medication, without effect.  States her energy has been decreased due to her heart condition recently.  Patient denies any SI/HI.  No acute psychosis or anxiety.  No signs or symptoms of hypomania/shady.  Patient denied use any drugs.      *Medical Review Of Symptoms (not dx conditions):   ROS      *Psychiatric Examination:   Vitals:   Vitals:    04/07/21 0004 04/07/21 0500 04/07/21 0728 04/07/21 1153   BP: 158/89 151/84 (!) 175/100 152/81   Pulse: 76 69 78 74   Resp: 18 18  16   Temp: 36.7 °C " "(98 °F) 36.7 °C (98.1 °F) 36.7 °C (98.1 °F) 36.7 °C (98 °F)   TempSrc: Temporal Temporal Temporal Temporal   SpO2: 96% 96% 96% 98%   Weight:       Height:         Appearance: appears stated age, fair grooming and hygiene, calm, cooperative, good eye contact  Abnormal movements: none  Gait/posture: normal  Speech: normal volume, tone and rhythm  Though process: linear and goal oriented  Associations: no loose associations  Thought content: denies AVH, no delusions or paranoia elicited, does not appear to be responding to internal stimuli, neither is internally preoccupied.   Judgement and Insight: fair/fair  Orientation:oriented to person, place, time and situation  Recent and Remote Memory: intact  Attention Span and Concentration: intact  Language: fluid   Fund of Knowledge: not tested   Mood and Affect:\" Good\" euthymic, appropriate   SI/HI:denies any active or passive SI/HI              *PAST MEDICAL/PSYCH/FAMILY/SOCIAL(as reported by patient):       *medical hx:           Past Medical History:   Diagnosis Date   • Arthritis     bilat hands   • Bowel habit changes 03/01/2018    Constipatiom   • Breast mass     Right   • Breath shortness     with exertion   • Bronchitis 2003   • CAD (coronary artery disease) 2005    IVANIA to OM   • Dental disorder     lower plate   • Dyslipidemia    • Essential hypertension    • Hemorrhagic disorder (HCC)     \"easy bleeder'   • High cholesterol    • Myocardial infarct (HCC) 2005   • Obesity    • Renal disorder     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   • Urinary incontinence     wears pads     Past Surgical History:   Procedure Laterality Date   • MASTECTOMY Right 3/8/2018    Procedure: MASTECTOMY - PARTIAL;  Surgeon: Ana Giron M.D.;  Location: SURGERY [facility];  Service: General   • NODE BIOPSY SENTINEL Right 3/8/2018    Procedure: NODE BIOPSY SENTINEL - AXILLARY;  Surgeon: Ana Giron M.D.;  Location: SURGERY [facility];  Service: General   • BREAST BIOPSY Right " 3/1/2018    Procedure: BREAST BIOPSY- EXCISION , MEDIAL LESION, EXCISION OF MASS, LATERAL  AFTER WIRE LOCALIZATION  ;  Surgeon: Ana Giron M.D.;  Location: SURGERY [facility];  Service: General   • ANGIOPLASTY  5/2006    OM [place] in [place]   • STENT PLACEMENT  2005    [place]   • OTHER  1956    tonsillectomy        *psychiatric hx:    Denies    *family Psych hx: Denies     *social hx: Patient is domiciled, lives alone with her dog.  Alcohol: Rarely  Drugs: Denies  Denies tobacco     *MEDICAL HX: labs, MARS, medications, etc were reviewed. Only those findings of potential interest to psychiatry are noted below:    *Current Medical issues:   see below     *Allergies:  Allergies   Allergen Reactions   • Iodine Rash and Itching     She reports rash and severe significant itching after her cardiac catheterization in 2006, unclear if this was the topical scrub or related to IV contrast, for these reasons she does avoid shellfish     • Misc Natural Products Rash     nickel/metal   • Tape Rash     Paper tape ok      *Current Medications:    Current Facility-Administered Medications:   •  [START ON 4/8/2021] isosorbide mononitrate SR (IMDUR) tablet 60 mg, 60 mg, Oral, Q DAY, Shanell Pritchard, A.P.R.N.  •  predniSONE (DELTASONE) tablet 50 mg, 50 mg, Oral, Q6HR **AND** [START ON 4/8/2021] diphenhydrAMINE (BENADRYL) tablet/capsule 50 mg, 50 mg, Oral, Once, Shanell Pritchard, A.P.R.N.  •  lisinopril (PRINIVIL) tablet 10 mg, 10 mg, Oral, Q DAY, Gordon Chiang M.D., 10 mg at 04/07/21 0624  •  LORazepam (ATIVAN) injection 1-2 mg, 1-2 mg, Intravenous, Q2HRS PRN, Gordon Chiang M.D.  •  haloperidol lactate (HALDOL) injection 1 mg, 1 mg, Intravenous, Q6HRS PRN, Gordon Chiang M.D., 1 mg at 04/06/21 2130  •  carvedilol (COREG) tablet 12.5 mg, 12.5 mg, Oral, BID WITH MEALS, Vania Roque M.D., 12.5 mg at 04/07/21 0732  •  heparin infusion 25,000 units in 500 mL 0.45% NACL, 0-30 Units/kg/hr (Adjusted),  Intravenous, Continuous, Gopi Alvarenga M.D., Last Rate: 32 mL/hr at 04/07/21 1358, 20 Units/kg/hr at 04/07/21 1358  •  heparin injection 2,000 Units, 2,000 Units, Intravenous, PRN, Gopi Alvarenga M.D., 2,000 Units at 04/06/21 2340  •  senna-docusate (PERICOLACE or SENOKOT S) 8.6-50 MG per tablet 2 tablet, 2 tablet, Oral, BID, 2 tablet at 04/06/21 0529 **AND** polyethylene glycol/lytes (MIRALAX) PACKET 1 Packet, 1 Packet, Oral, QDAY PRN **AND** magnesium hydroxide (MILK OF MAGNESIA) suspension 30 mL, 30 mL, Oral, QDAY PRN **AND** bisacodyl (DULCOLAX) suppository 10 mg, 10 mg, Rectal, QDAY PRN, Bret Lovett M.D.  •  Respiratory Therapy Consult, , Nebulization, Continuous RT, Bret Lovett M.D.  •  acetaminophen (Tylenol) tablet 650 mg, 650 mg, Oral, Q6HRS PRN, Bret Lovett M.D.  •  labetalol (NORMODYNE/TRANDATE) injection 10 mg, 10 mg, Intravenous, Q4HRS PRN, Bret Lovett M.D., 10 mg at 04/05/21 1322  •  ondansetron (ZOFRAN) syringe/vial injection 4 mg, 4 mg, Intravenous, Q4HRS PRN, Bret Lovett M.D.  •  ondansetron (ZOFRAN ODT) dispertab 4 mg, 4 mg, Oral, Q4HRS PRN, Bret Lovett M.D.  •  aspirin (ASA) chewable tab 81 mg, 81 mg, Oral, DAILY, Bret Lovett M.D., 81 mg at 04/07/21 0624  •  captopril (CAPOTEN) tablet 12.5 mg, 12.5 mg, Oral, Q8HRS PRN, Bret Lovett M.D., 12.5 mg at 04/06/21 0944  •  atorvastatin (LIPITOR) tablet 80 mg, 80 mg, Oral, Q EVENING, Bret Lovett M.D., 80 mg at 04/05/21 1322  •  NS infusion, , Intravenous, Continuous, Vania Roque M.D., Last Rate: 100 mL/hr at 04/07/21 0012, New Bag at 04/07/21 0012  *ECG: personally reviewed QTc  524  *Cranial Imaging: head ct reviewed  EC-ECHOCARDIOGRAM COMPLETE W/O CONT   Final Result      CT-HEAD W/O   Final Result      1.  Cerebral atrophy.      2.  White matter lucencies most consistent with small vessel ischemic change versus  demyelination or gliosis.      3.  Otherwise, Head CT without contrast with no acute findings. No evidence of acute cerebral infarction, hemorrhage or mass lesion.      DX-CHEST-PORTABLE (1 VIEW)   Final Result      Mild cardiomegaly and possible vascular congestion.      CL-LEFT HEART CATHETERIZATION WITH POSSIBLE INTERVENTION    (Results Pending)        EEG:  No done     *Labs:  Recent Results (from the past 72 hour(s))   EKG (NOW)    Collection Time: 21 10:08 AM   Result Value Ref Range    Report       [facility] Emergency Dept.    Test Date:  2021  Pt Name:    TARA NOWAK                 Department: ER  MRN:        [id number]                      Room:       [facility]  Gender:     Female                       Technician: [id number]  :        1945                   Requested By:ER TRIAGE PROTOCOL  Order #:    [id number]                    Reading MD:    Measurements  Intervals                                Axis  Rate:       84                           P:          54  HI:         192                          QRS:        22  QRSD:       92                           T:          252  QT:         376  QTc:        445    Interpretive Statements  SINUS RHYTHM  REPOL ABNRM SUGGESTS ISCHEMIA, DIFFUSE LEADS  Compared to ECG 2018 07:38:23  Early repolarization now present  Possible ischemia now present  Myocardial infarct finding no longer present     CBC with Differential    Collection Time: 21 10:19 AM   Result Value Ref Range    WBC 8.4 4.8 - 10.8 K/uL    RBC 5.02 4.20 - 5.40 M/uL    Hemoglobin 15.3 12.0 - 16.0 g/dL    Hematocrit 47.8 (H) 37.0 - 47.0 %    MCV 95.2 81.4 - 97.8 fL    MCH 30.5 27.0 - 33.0 pg    MCHC 32.0 (L) 33.6 - 35.0 g/dL    RDW 46.7 35.9 - 50.0 fL    Platelet Count 268 164 - 446 K/uL    MPV 10.1 9.0 - 12.9 fL    Neutrophils-Polys 63.00 44.00 - 72.00 %    Lymphocytes 25.30 22.00 - 41.00 %    Monocytes 10.20 0.00 - 13.40 %    Eosinophils 0.70 0.00 - 6.90 %     Basophils 0.40 0.00 - 1.80 %    Immature Granulocytes 0.40 0.00 - 0.90 %    Nucleated RBC 0.00 /100 WBC    Neutrophils (Absolute) 5.31 2.00 - 7.15 K/uL    Lymphs (Absolute) 2.13 1.00 - 4.80 K/uL    Monos (Absolute) 0.86 (H) 0.00 - 0.85 K/uL    Eos (Absolute) 0.06 0.00 - 0.51 K/uL    Baso (Absolute) 0.03 0.00 - 0.12 K/uL    Immature Granulocytes (abs) 0.03 0.00 - 0.11 K/uL    NRBC (Absolute) 0.00 K/uL   Complete Metabolic Panel (CMP)    Collection Time: 04/05/21 10:19 AM   Result Value Ref Range    Sodium 141 135 - 145 mmol/L    Potassium 3.3 (L) 3.6 - 5.5 mmol/L    Chloride 101 96 - 112 mmol/L    Co2 29 20 - 33 mmol/L    Anion Gap 11.0 7.0 - 16.0    Glucose 110 (H) 65 - 99 mg/dL    Bun 12 8 - 22 mg/dL    Creatinine 0.67 0.50 - 1.40 mg/dL    Calcium 9.0 8.5 - 10.5 mg/dL    AST(SGOT) 34 12 - 45 U/L    ALT(SGPT) 27 2 - 50 U/L    Alkaline Phosphatase 70 30 - 99 U/L    Total Bilirubin 0.4 0.1 - 1.5 mg/dL    Albumin 3.8 3.2 - 4.9 g/dL    Total Protein 7.5 6.0 - 8.2 g/dL    Globulin 3.7 (H) 1.9 - 3.5 g/dL    A-G Ratio 1.0 g/dL   Troponin STAT    Collection Time: 04/05/21 10:19 AM   Result Value Ref Range    Troponin T 182 (H) 6 - 19 ng/L   ESTIMATED GFR    Collection Time: 04/05/21 10:19 AM   Result Value Ref Range    GFR If African American >60 >60 mL/min/1.73 m 2    GFR If Non African American >60 >60 mL/min/1.73 m 2   aPTT    Collection Time: 04/05/21 10:19 AM   Result Value Ref Range    APTT 31.9 24.7 - 36.0 sec   Prothrombin Time    Collection Time: 04/05/21 10:19 AM   Result Value Ref Range    PT 13.5 12.0 - 14.6 sec    INR 1.00 0.87 - 1.13   Heparin Xa (Unfractionated)    Collection Time: 04/05/21 10:19 AM   Result Value Ref Range    Heparin Xa (UFH) <0.10 IU/mL   EKG (NOW)    Collection Time: 04/05/21 10:19 AM   Result Value Ref Range    Report       [facility] Emergency Dept.    Test Date:  2021-04-05  Pt Name:    TARA NOWAK                 Department: ER  MRN:        [id number]                       Room:       [facility]  Gender:     Female                       Technician:  :        1945                   Requested By:SUDHA GREY  Order #:    [id number]                    Reading MD:    Measurements  Intervals                                Axis  Rate:       82                           P:          51  OH:         188                          QRS:        21  QRSD:       90                           T:          243  QT:         400  QTc:        468    Interpretive Statements  SINUS RHYTHM  BORDERLINE INFERIOR Q WAVES  REPOL ABNRM SUGGESTS ISCHEMIA, DIFFUSE LEADS  Compared to ECG 2021 10:08:37  No significant changes     SARS-COV Antigen ROGER: Collect dry nasal swab AND NP swab in VTM    Collection Time: 21 12:30 PM   Result Value Ref Range    SARS-CoV-2 Source Nasal Swab     SARS-COV ANTIGEN ROGER NotDetected Not-Detected   SARS-CoV-2 PCR (24 hour In-House): Collect NP swab in VTM    Collection Time: 21 12:30 PM    Specimen: Respirate   Result Value Ref Range    SARS-CoV-2 Source NP Swab     SARS-CoV-2 by PCR NotDetected    TROPONIN    Collection Time: 21  5:29 PM   Result Value Ref Range    Troponin T 257 (H) 6 - 19 ng/L   Heparin Xa (Unfractionated)    Collection Time: 21  5:29 PM   Result Value Ref Range    Heparin Xa (UFH) 0.16 IU/mL   TSH WITH REFLEX TO FT4    Collection Time: 21  5:29 PM   Result Value Ref Range    TSH 2.590 0.380 - 5.330 uIU/mL   proBrain Natriuretic Peptide, NT    Collection Time: 21  5:29 PM   Result Value Ref Range    NT-proBNP 2276 (H) 0 - 125 pg/mL   TROPONIN    Collection Time: 21  1:16 AM   Result Value Ref Range    Troponin T 298 (H) 6 - 19 ng/L   Heparin Xa (Unfractionated)    Collection Time: 21  1:16 AM   Result Value Ref Range    Heparin Xa (UFH) 0.29 IU/mL   CBC with Differential    Collection Time: 21  1:16 AM   Result Value Ref Range    WBC 9.9 4.8 - 10.8 K/uL    RBC 4.61 4.20 - 5.40 M/uL    Hemoglobin  14.4 12.0 - 16.0 g/dL    Hematocrit 43.8 37.0 - 47.0 %    MCV 95.0 81.4 - 97.8 fL    MCH 31.2 27.0 - 33.0 pg    MCHC 32.9 (L) 33.6 - 35.0 g/dL    RDW 47.5 35.9 - 50.0 fL    Platelet Count 248 164 - 446 K/uL    MPV 10.2 9.0 - 12.9 fL    Neutrophils-Polys 60.90 44.00 - 72.00 %    Lymphocytes 26.30 22.00 - 41.00 %    Monocytes 10.70 0.00 - 13.40 %    Eosinophils 1.30 0.00 - 6.90 %    Basophils 0.40 0.00 - 1.80 %    Immature Granulocytes 0.40 0.00 - 0.90 %    Nucleated RBC 0.00 /100 WBC    Neutrophils (Absolute) 6.00 2.00 - 7.15 K/uL    Lymphs (Absolute) 2.59 1.00 - 4.80 K/uL    Monos (Absolute) 1.05 (H) 0.00 - 0.85 K/uL    Eos (Absolute) 0.13 0.00 - 0.51 K/uL    Baso (Absolute) 0.04 0.00 - 0.12 K/uL    Immature Granulocytes (abs) 0.04 0.00 - 0.11 K/uL    NRBC (Absolute) 0.00 K/uL   Prothrombin Time    Collection Time: 04/06/21  1:16 AM   Result Value Ref Range    PT 14.1 12.0 - 14.6 sec    INR 1.06 0.87 - 1.13   APTT    Collection Time: 04/06/21  1:16 AM   Result Value Ref Range    APTT 87.4 (H) 24.7 - 36.0 sec   Comp Metabolic Panel    Collection Time: 04/06/21  1:16 AM   Result Value Ref Range    Sodium 140 135 - 145 mmol/L    Potassium 4.0 3.6 - 5.5 mmol/L    Chloride 102 96 - 112 mmol/L    Co2 26 20 - 33 mmol/L    Anion Gap 12.0 7.0 - 16.0    Glucose 110 (H) 65 - 99 mg/dL    Bun 10 8 - 22 mg/dL    Creatinine 0.67 0.50 - 1.40 mg/dL    Calcium 9.0 8.5 - 10.5 mg/dL    AST(SGOT) 43 12 - 45 U/L    ALT(SGPT) 24 2 - 50 U/L    Alkaline Phosphatase 65 30 - 99 U/L    Total Bilirubin 0.4 0.1 - 1.5 mg/dL    Albumin 3.5 3.2 - 4.9 g/dL    Total Protein 6.9 6.0 - 8.2 g/dL    Globulin 3.4 1.9 - 3.5 g/dL    A-G Ratio 1.0 g/dL   Lipid Profile    Collection Time: 04/06/21  1:16 AM   Result Value Ref Range    Cholesterol,Tot 173 100 - 199 mg/dL    Triglycerides 123 0 - 149 mg/dL    HDL 34 (A) >=40 mg/dL     (H) <100 mg/dL   ESTIMATED GFR    Collection Time: 04/06/21  1:16 AM   Result Value Ref Range    GFR If   >60 >60 mL/min/1.73 m 2    GFR If Non African American >60 >60 mL/min/1.73 m 2   TROPONIN    Collection Time: 04/06/21  5:55 AM   Result Value Ref Range    Troponin T 279 (H) 6 - 19 ng/L   Heparin Xa (Unfractionated)    Collection Time: 04/06/21  8:00 AM   Result Value Ref Range    Heparin Xa (UFH) 0.52 IU/mL   TROPONIN    Collection Time: 04/06/21 12:06 PM   Result Value Ref Range    Troponin T 335 (H) 6 - 19 ng/L   Comp Metabolic Panel    Collection Time: 04/06/21  6:12 PM   Result Value Ref Range    Sodium 138 135 - 145 mmol/L    Potassium 3.9 3.6 - 5.5 mmol/L    Chloride 102 96 - 112 mmol/L    Co2 26 20 - 33 mmol/L    Anion Gap 10.0 7.0 - 16.0    Glucose 101 (H) 65 - 99 mg/dL    Bun 10 8 - 22 mg/dL    Creatinine 0.79 0.50 - 1.40 mg/dL    Calcium 9.0 8.5 - 10.5 mg/dL    AST(SGOT) 41 12 - 45 U/L    ALT(SGPT) 20 2 - 50 U/L    Alkaline Phosphatase 66 30 - 99 U/L    Total Bilirubin 0.5 0.1 - 1.5 mg/dL    Albumin 3.6 3.2 - 4.9 g/dL    Total Protein 7.0 6.0 - 8.2 g/dL    Globulin 3.4 1.9 - 3.5 g/dL    A-G Ratio 1.1 g/dL   CBC WITH DIFFERENTIAL    Collection Time: 04/06/21  6:12 PM   Result Value Ref Range    WBC 10.0 4.8 - 10.8 K/uL    RBC 4.65 4.20 - 5.40 M/uL    Hemoglobin 14.3 12.0 - 16.0 g/dL    Hematocrit 45.1 37.0 - 47.0 %    MCV 97.0 81.4 - 97.8 fL    MCH 30.8 27.0 - 33.0 pg    MCHC 31.7 (L) 33.6 - 35.0 g/dL    RDW 48.3 35.9 - 50.0 fL    Platelet Count 263 164 - 446 K/uL    MPV 10.7 9.0 - 12.9 fL    Neutrophils-Polys 68.20 44.00 - 72.00 %    Lymphocytes 20.10 (L) 22.00 - 41.00 %    Monocytes 10.60 0.00 - 13.40 %    Eosinophils 0.20 0.00 - 6.90 %    Basophils 0.30 0.00 - 1.80 %    Immature Granulocytes 0.60 0.00 - 0.90 %    Nucleated RBC 0.00 /100 WBC    Neutrophils (Absolute) 6.80 2.00 - 7.15 K/uL    Lymphs (Absolute) 2.01 1.00 - 4.80 K/uL    Monos (Absolute) 1.06 (H) 0.00 - 0.85 K/uL    Eos (Absolute) 0.02 0.00 - 0.51 K/uL    Baso (Absolute) 0.03 0.00 - 0.12 K/uL    Immature Granulocytes (abs) 0.06 0.00 - 0.11  K/uL    NRBC (Absolute) 0.00 K/uL   TSH    Collection Time: 21  6:12 PM   Result Value Ref Range    TSH 2.530 0.380 - 5.330 uIU/mL   TROPONIN    Collection Time: 21  6:12 PM   Result Value Ref Range    Troponin T 309 (H) 6 - 19 ng/L   ESTIMATED GFR    Collection Time: 21  6:12 PM   Result Value Ref Range    GFR If African American >60 >60 mL/min/1.73 m 2    GFR If Non African American >60 >60 mL/min/1.73 m 2   EKG    Collection Time: 21  9:55 PM   Result Value Ref Range    Report       [facility]    Test Date:  2021  Pt Name:    TARA NOWAK                 Department: 183  MRN:        [id number]                      Room:       [facility]  Gender:     Female                       Technician: TELMA  :        [id number]                   Requested By:RAUL PAIZ  Order #:    [id number]                    Reading MD: Abdoul Butler MD    Measurements  Intervals                                Axis  Rate:       87                           P:          64  AR:         182                          QRS:        18  QRSD:       95                           T:          -69  QT:         435  QTc:        524    Interpretive Statements  SINUS RHYTHM  VENTRICULAR PREMATURE COMPLEX  PROBABLE LVH WITH SECONDARY REPOL ABNRM  INFEROPOSTERIOR INFARCT, RECENT  PROLONGED QT INTERVAL  ST CHANGES, DIFFUSE LEADS, CONSIDER ISCHEMIA  Compared to ECG 2021 10:19:18  Ventricular premature complex(es) now present  Electronically Signed On 2021 7:40:31 PDT by Abdoul Butler MD      Heparin Xa (Unfractionated)    Collection Time: 21 10:20 PM   Result Value Ref Range    Heparin Xa (UFH) <0.10 IU/mL   CBC WITH DIFFERENTIAL    Collection Time: 21 12:08 AM   Result Value Ref Range    WBC 10.0 4.8 - 10.8 K/uL    RBC 4.56 4.20 - 5.40 M/uL    Hemoglobin 14.3 12.0 - 16.0 g/dL    Hematocrit 42.7 37.0 - 47.0 %    MCV 93.6 81.4 - 97.8 fL    MCH 31.4 27.0 - 33.0 pg    MCHC 33.5 (L) 33.6 - 35.0 g/dL     RDW 46.6 35.9 - 50.0 fL    Platelet Count 251 164 - 446 K/uL    MPV 10.0 9.0 - 12.9 fL    Neutrophils-Polys 68.80 44.00 - 72.00 %    Lymphocytes 20.20 (L) 22.00 - 41.00 %    Monocytes 9.40 0.00 - 13.40 %    Eosinophils 0.90 0.00 - 6.90 %    Basophils 0.30 0.00 - 1.80 %    Immature Granulocytes 0.40 0.00 - 0.90 %    Nucleated RBC 0.00 /100 WBC    Neutrophils (Absolute) 6.90 2.00 - 7.15 K/uL    Lymphs (Absolute) 2.03 1.00 - 4.80 K/uL    Monos (Absolute) 0.94 (H) 0.00 - 0.85 K/uL    Eos (Absolute) 0.09 0.00 - 0.51 K/uL    Baso (Absolute) 0.03 0.00 - 0.12 K/uL    Immature Granulocytes (abs) 0.04 0.00 - 0.11 K/uL    NRBC (Absolute) 0.00 K/uL   Comp Metabolic Panel    Collection Time: 04/07/21 12:08 AM   Result Value Ref Range    Sodium 136 135 - 145 mmol/L    Potassium 3.7 3.6 - 5.5 mmol/L    Chloride 101 96 - 112 mmol/L    Co2 26 20 - 33 mmol/L    Anion Gap 9.0 7.0 - 16.0    Glucose 116 (H) 65 - 99 mg/dL    Bun 10 8 - 22 mg/dL    Creatinine 0.57 0.50 - 1.40 mg/dL    Calcium 8.9 8.5 - 10.5 mg/dL    AST(SGOT) 46 (H) 12 - 45 U/L    ALT(SGPT) 23 2 - 50 U/L    Alkaline Phosphatase 69 30 - 99 U/L    Total Bilirubin 0.6 0.1 - 1.5 mg/dL    Albumin 3.6 3.2 - 4.9 g/dL    Total Protein 7.2 6.0 - 8.2 g/dL    Globulin 3.6 (H) 1.9 - 3.5 g/dL    A-G Ratio 1.0 g/dL   TROPONIN    Collection Time: 04/07/21 12:08 AM   Result Value Ref Range    Troponin T 260 (H) 6 - 19 ng/L   ESTIMATED GFR    Collection Time: 04/07/21 12:08 AM   Result Value Ref Range    GFR If African American >60 >60 mL/min/1.73 m 2    GFR If Non African American >60 >60 mL/min/1.73 m 2   URINALYSIS,CULTURE IF INDICATED    Collection Time: 04/07/21  3:09 AM    Specimen: Urine, Cath   Result Value Ref Range    Color Yellow     Character Clear     Specific Gravity 1.009 <1.035    Ph 7.0 5.0 - 8.0    Glucose Negative Negative mg/dL    Ketones 15 (A) Negative mg/dL    Protein 30 (A) Negative mg/dL    Bilirubin Negative Negative    Urobilinogen, Urine 0.2 Negative    Nitrite  Negative Negative    Leukocyte Esterase Negative Negative    Occult Blood Trace (A) Negative    Micro Urine Req Microscopic    URINE DRUG SCREEN    Collection Time: 04/07/21  3:09 AM   Result Value Ref Range    Amphetamines Urine Negative Negative    Barbiturates Negative Negative    Benzodiazepines Negative Negative    Cocaine Metabolite Negative Negative    Methadone Negative Negative    Opiates Negative Negative    Oxycodone Negative Negative    Phencyclidine -Pcp Negative Negative    Propoxyphene Negative Negative    Cannabinoid Metab Negative Negative   URINE MICROSCOPIC (W/UA)    Collection Time: 04/07/21  3:09 AM   Result Value Ref Range    WBC 0-2 /hpf    RBC 0-2 /hpf    Bacteria Negative None /hpf    Epithelial Cells Negative /hpf    Hyaline Cast 0-2 /lpf   Heparin Xa (Unfractionated)    Collection Time: 04/07/21  5:48 AM   Result Value Ref Range    Heparin Xa (UFH) 0.67 IU/mL   TROPONIN    Collection Time: 04/07/21  5:48 AM   Result Value Ref Range    Troponin T 334 (H) 6 - 19 ng/L   EC-ECHOCARDIOGRAM COMPLETE W/O CONT    Collection Time: 04/07/21  9:45 AM   Result Value Ref Range    Eject.Frac. MOD BP 49.83     Eject.Frac. MOD 4C 45.3     Eject.Frac. MOD 2C 52.88     Left Ventrical Ejection Fraction 45    Heparin Xa (Unfractionated)    Collection Time: 04/07/21 11:40 AM   Result Value Ref Range    Heparin Xa (UFH) 0.60 IU/mL   TROPONIN    Collection Time: 04/07/21 11:40 AM   Result Value Ref Range    Troponin T 379 (H) 6 - 19 ng/L       Recent Labs     04/06/21  0116 04/06/21  1812 04/07/21  0008   WBC 9.9 10.0 10.0   RBC 4.61 4.65 4.56   HEMOGLOBIN 14.4 14.3 14.3   HEMATOCRIT 43.8 45.1 42.7   MCV 95.0 97.0 93.6   MCH 31.2 30.8 31.4   RDW 47.5 48.3 46.6   PLATELETCT 248 263 251   MPV 10.2 10.7 10.0   NEUTSPOLYS 60.90 68.20 68.80   LYMPHOCYTES 26.30 20.10* 20.20*   MONOCYTES 10.70 10.60 9.40   EOSINOPHILS 1.30 0.20 0.90   BASOPHILS 0.40 0.30 0.30     Lab Results   Component Value Date/Time    SODIUM 136  04/07/2021 12:08 AM    POTASSIUM 3.7 04/07/2021 12:08 AM    CHLORIDE 101 04/07/2021 12:08 AM    CO2 26 04/07/2021 12:08 AM    GLUCOSE 116 (H) 04/07/2021 12:08 AM    BUN 10 04/07/2021 12:08 AM    CREATININE 0.57 04/07/2021 12:08 AM         No results found for: BREATHALIZER  No components found for: BLOODALCOHOL   Lab Results   Component Value Date/Time    AMPHUR Negative 04/07/2021 0309    BARBSURINE Negative 04/07/2021 0309    BENZODIAZU Negative 04/07/2021 0309    COCAINEMET Negative 04/07/2021 0309    METHADONE Negative 04/07/2021 0309    OPIATES Negative 04/07/2021 0309    OXYCODN Negative 04/07/2021 0309    PCPURINE Negative 04/07/2021 0309    PROPOXY Negative 04/07/2021 0309    CANNABINOID Negative 04/07/2021 0309     Lab Results   Component Value Date/Time    FREET4 0.82 07/05/2017 1130        Assessment: Patient has capacity to make medical and discharge planning decisions at this time.      Dx:  Delirium, resolved    Medical (specify new and established dx):  NSTEMI  Hypertensive emergency  Hypokalemia  Dyslipidemia      Plan:  1- Legal hold: Not applicable:  2- Psychotropic medications: Not warranted at this time  3- Patient has capacity to make medical and discharge planning decisions at this time. 4- Psychiatry signing off.     Thank you for the consult.     This note was created using voice recognition software (Dragon). The accuracy of the dictation is limited by the abilities of the software. I have reviewed the note prior to signing. However, error related to voice recognition software and /or scribes may still exist and should be interpreted within the appropriate context.

## 2021-04-07 NOTE — PROGRESS NOTES
Cardiology Follow Up Progress Note    Date of Service  4/7/2021    Attending Physician  Rehan Lovelace M.D.    Chief Complaint   Chest pain and LOVETT.    HPI  Sania Viera is a 75 y.o. female admitted 4/5/2021 with chest discomfort that radiated to her jaw and left arm. Patient was found to be significantly hypertensive with SBP of 201, EKG showed diffuse ST changes concerning for ischemia with elevated high sensitivity troponin of 182>257.     Past medical history significant for CAD with stent to the OM in 2006 with known mild to moderate residual disease in the LAD and RCA, HTN, HLD, ETOH use, former smoker and breast cancer S/P surgery and radiation on the right breast (was supposed to have chemo but was unable due to transportation issues).     Interim Events  4/6/2021: Patient with some confusion and anxiousness, CT head pending.   NSR on tele with no ectopy. Currently denies chest pain.   Plan is for LHC in am after premedicated for her iodine allergy. CBC and chem panel remains stable.  4/7/2021: No overnight cardiac events. Personal telemetry review shows SR. VSS; hypertensive this AM; RA; No daily weight. Denies chest pain; hasn't been OOB. Heparin gtt. LHC on hold until patient's competency is determined. Hold iodine allergy pre-medication until LHC time determined.     Addendum: patient oriented this afternoon. LHC scheduled for tomorrow. NPO at midnight; pre-medicate again tonight for iodine allergy.     Review of Systems  Review of Systems   Unable to perform ROS: Mental status change   Constitutional: Negative for fever.   Respiratory: Negative for chest tightness and shortness of breath.    Cardiovascular: Negative for chest pain, palpitations and leg swelling.   Gastrointestinal: Negative for abdominal pain and blood in stool.   Genitourinary: Negative for hematuria.   Musculoskeletal: Negative for gait problem.   Neurological: Negative for dizziness and syncope.   All other systems reviewed  and are negative.      Vital signs in last 24 hours  Temp:  [36.4 °C (97.6 °F)-37.1 °C (98.7 °F)] 36.7 °C (98.1 °F)  Pulse:  [61-78] 78  Resp:  [18] 18  BP: (139-175)/() 175/100  SpO2:  [92 %-96 %] 96 %    Physical Exam  Physical Exam  Vitals and nursing note reviewed.   Constitutional:       General: She is not in acute distress.     Appearance: Normal appearance.   HENT:      Head: Normocephalic.   Cardiovascular:      Rate and Rhythm: Normal rate and regular rhythm.      Pulses: Normal pulses.      Heart sounds: Normal heart sounds.   Pulmonary:      Effort: Pulmonary effort is normal.      Breath sounds: Normal breath sounds.   Abdominal:      Palpations: Abdomen is soft.      Tenderness: There is no abdominal tenderness.   Musculoskeletal:      Cervical back: Normal range of motion.      Right lower leg: No edema (very trace pitting).      Left lower leg: No edema (very trace pitting).   Skin:     General: Skin is warm and dry.   Neurological:      General: No focal deficit present.      Mental Status: She is alert. She is disoriented and confused.      Comments: Disoriented to situation and time   Psychiatric:         Cognition and Memory: Cognition is impaired. Memory is impaired.         Lab Review  Lab Results   Component Value Date/Time    WBC 10.0 04/07/2021 12:08 AM    RBC 4.56 04/07/2021 12:08 AM    HEMOGLOBIN 14.3 04/07/2021 12:08 AM    HEMATOCRIT 42.7 04/07/2021 12:08 AM    MCV 93.6 04/07/2021 12:08 AM    MCH 31.4 04/07/2021 12:08 AM    MCHC 33.5 (L) 04/07/2021 12:08 AM    MPV 10.0 04/07/2021 12:08 AM      Lab Results   Component Value Date/Time    SODIUM 136 04/07/2021 12:08 AM    POTASSIUM 3.7 04/07/2021 12:08 AM    CHLORIDE 101 04/07/2021 12:08 AM    CO2 26 04/07/2021 12:08 AM    GLUCOSE 116 (H) 04/07/2021 12:08 AM    BUN 10 04/07/2021 12:08 AM    CREATININE 0.57 04/07/2021 12:08 AM      Lab Results   Component Value Date/Time    ASTSGOT 46 (H) 04/07/2021 12:08 AM    ALTSGPT 23 04/07/2021  12:08 AM     Lab Results   Component Value Date/Time    CHOLSTRLTOT 173 04/06/2021 01:16 AM     (H) 04/06/2021 01:16 AM    HDL 34 (A) 04/06/2021 01:16 AM    TRIGLYCERIDE 123 04/06/2021 01:16 AM    TROPONINT 334 (H) 04/07/2021 05:48 AM       Recent Labs     04/05/21  1729   NTPROBNP 2276*       Assessment/Plan  NSTEMI:  - Currently chest pain free.   - Trop trending down with a peak of 298.   - Continue hep gtt per weight based protocol.  - TTE ordered and pending.  - Start IMDUR 60 mg daily.   - Continue coreg 12.5 mg BID.   - LHC on hold until competency is determined.     HTN:  - Remains poorly controlled.   - Increase IMDUR per above.  - Lisinopril 10 mg daily .   - Continue BB as above.     CAD:  - Continue ASA 81 mg daily and statin therapy.     HLD:  - , goal is <70.  - High intensity Atorvastatin 80 mg daily started this admission.    Mild Confusion:  - Denies any recent ETOH use.  - CT of head completed; no acute fndings.  - LHC postponed   - Primary medical team following; Psych to consult    Thank you for allowing me to participate in the care of this patient.    I will continue to follow this patient.     Please contact me with any questions.    FRANCIA Hernandez.   Cardiology, [facility]  [phone]

## 2021-04-07 NOTE — PROGRESS NOTES
[facility] Medicine Daily Progress Note    Date of Service  4/7/2021    Chief Complaint  75 y.o. female admitted 4/5/2021 with chest pain    Hospital Course  75 y.o. female with PMHx CAD s/p IVANIA to OM 2006, breast cancer s/p removal and radiation 2006, dyslipidemia, HTN admitted for evaluation and management of NSTEMI and hypertensive emergency.       Presenting symptoms include 4 days of chest discomfort and pain that initiated in her right ear and jaw and progressed to her left arm. In the ED patient was hypertensive to 200's SBP, troponins were elevated to 182 and continued to trend upward to 257->298, EKG showed diffuse ST segment depression. Cardiology was consulted and recommended NTG and heparin drip before ECHO and  LHC during this hospitalization.    Patient with agitation 4/6, requiring restraints and sedating medications. Patient placed on legal hold as she was not safe to discharge herself home. Legal hold and restraints discontinued 4/7 as patient back to her baseline mentation.    Interval Problem Update  No acute events overnight.  Patient alert and oriented x4 this morning at bedside.  Discontinue legal hold and restraints.  Patient denies chest pain at this time.  On heparin drip. Monitor on telemetry.  [facility] rescheduled for tomorrow 4/8.  Cardiology following, appreciate recommendations.    Consultants/Specialty  cardiology    Code Status  Full Code    Disposition  Inpatient, pending [facility] 4/8, cardiology recommendations.    Review of Systems  Review of Systems   Constitutional: Negative for chills and fever.   Eyes: Negative for blurred vision and pain.   Respiratory: Negative for cough and shortness of breath.    Cardiovascular: Negative for chest pain, palpitations and leg swelling.   Gastrointestinal: Negative for abdominal pain, nausea and vomiting.   Genitourinary: Negative for dysuria and urgency.   Musculoskeletal: Negative for back pain.   Skin: Negative for itching and rash.   Neurological:  Negative for dizziness, sensory change, focal weakness and headaches.   Psychiatric/Behavioral: Negative for substance abuse. The patient does not have insomnia.         Physical Exam  Temp:  [36.7 °C (98 °F)-37.1 °C (98.7 °F)] 36.7 °C (98 °F)  Pulse:  [61-78] 74  Resp:  [16-18] 16  BP: (139-175)/() 152/81  SpO2:  [95 %-98 %] 98 %    Physical Exam  Vitals reviewed.   Constitutional:       General: She is not in acute distress.     Appearance: She is not diaphoretic.   HENT:      Head: Normocephalic and atraumatic.      Right Ear: External ear normal.      Left Ear: External ear normal.      Nose: Nose normal. No congestion.      Mouth/Throat:      Pharynx: No oropharyngeal exudate or posterior oropharyngeal erythema.   Eyes:      Extraocular Movements: Extraocular movements intact.      Pupils: Pupils are equal, round, and reactive to light.   Cardiovascular:      Rate and Rhythm: Normal rate and regular rhythm.   Pulmonary:      Effort: Pulmonary effort is normal. No respiratory distress.      Breath sounds: Normal breath sounds. No wheezing.   Abdominal:      General: Bowel sounds are normal. There is no distension.      Palpations: Abdomen is soft.      Tenderness: There is no abdominal tenderness.   Musculoskeletal:         General: No swelling. Normal range of motion.      Cervical back: Normal range of motion and neck supple.   Skin:     General: Skin is warm and dry.   Neurological:      General: No focal deficit present.      Mental Status: She is alert and oriented to person, place, and time.      Sensory: No sensory deficit.      Motor: No weakness.   Psychiatric:         Mood and Affect: Mood normal.         Behavior: Behavior normal.         Fluids    Intake/Output Summary (Last 24 hours) at 4/7/2021 1303  Last data filed at 4/7/2021 0300  Gross per 24 hour   Intake 1092 ml   Output 600 ml   Net 492 ml       Laboratory  Recent Labs     04/06/21  0116 04/06/21  1812 04/07/21  0008   WBC 9.9 10.0  10.0   RBC 4.61 4.65 4.56   HEMOGLOBIN 14.4 14.3 14.3   HEMATOCRIT 43.8 45.1 42.7   MCV 95.0 97.0 93.6   MCH 31.2 30.8 31.4   MCHC 32.9* 31.7* 33.5*   RDW 47.5 48.3 46.6   PLATELETCT 248 263 251   MPV 10.2 10.7 10.0     Recent Labs     04/06/21  0116 04/06/21  1812 04/07/21  0008   SODIUM 140 138 136   POTASSIUM 4.0 3.9 3.7   CHLORIDE 102 102 101   CO2 26 26 26   GLUCOSE 110* 101* 116*   BUN 10 10 10   CREATININE 0.67 0.79 0.57   CALCIUM 9.0 9.0 8.9     Recent Labs     04/05/21  1019 04/06/21  0116   APTT 31.9 87.4*   INR 1.00 1.06         Recent Labs     04/06/21  0116   TRIGLYCERIDE 123   HDL 34*   *       Imaging  EC-ECHOCARDIOGRAM COMPLETE W/O CONT   Final Result      CT-HEAD W/O   Final Result      1.  Cerebral atrophy.      2.  White matter lucencies most consistent with small vessel ischemic change versus demyelination or gliosis.      3.  Otherwise, Head CT without contrast with no acute findings. No evidence of acute cerebral infarction, hemorrhage or mass lesion.      DX-CHEST-PORTABLE (1 VIEW)   Final Result      Mild cardiomegaly and possible vascular congestion.      CL-LEFT HEART CATHETERIZATION WITH POSSIBLE INTERVENTION    (Results Pending)        Assessment/Plan  * NSTEMI (non-ST elevated myocardial infarction) (HCC)- (present on admission)  Assessment & Plan  4-day history of episodic substernal chest pain that is triggered on exertion and resolved with rest  H/O CAD status post stent 2005/RCA IVANIA 2006  Troponin 300's, stable, no chest pain  On heparin drip  Aspirin and high-dose statin  Nitroglycerin paste  Echocardiogram pending  Plan for [facility] tomorrow 4/8.  Cardiology following, appreciate recommendations    Hypertensive emergency- (present on admission)  Assessment & Plan  improved  SBP in the 200s on presentation  Complicated by cardiac ischemia, troponin elevated  Started on nitroglycerin per cardiology  Captopril 3 times daily as needed  Labetalol as needed    Hypokalemia  Assessment &  Plan  Replete as necessary    Dyslipidemia- (present on admission)  Assessment & Plan  Start high-dose statin       VTE prophylaxis: heparin drip

## 2021-04-07 NOTE — PROGRESS NOTES
Bedside shift report received from FRAN Mathew.  Safety check complete.  All patient needs met at this time.  Will continue to monitor.

## 2021-04-07 NOTE — CONSULTS
BRIEF PSYCHIATRIC CONSULT NOTE: patient seen, full note to follow.  Patient has capacity to make medical and discharge planning decisions at this time.  Psychiatry signing off.

## 2021-04-08 ENCOUNTER — PATIENT OUTREACH (OUTPATIENT)
Dept: HEALTH INFORMATION MANAGEMENT | Facility: OTHER | Age: 76
End: 2021-04-08

## 2021-04-08 ENCOUNTER — APPOINTMENT (OUTPATIENT)
Dept: CARDIOLOGY | Facility: MEDICAL CENTER | Age: 76
DRG: 247 | End: 2021-04-08
Attending: INTERNAL MEDICINE
Payer: MEDICARE

## 2021-04-08 LAB
ACT BLD: 235 SEC (ref 74–137)
ACT BLD: 263 SEC (ref 74–137)
ANION GAP SERPL CALC-SCNC: 9 MMOL/L (ref 7–16)
BUN SERPL-MCNC: 16 MG/DL (ref 8–22)
CALCIUM SERPL-MCNC: 8.7 MG/DL (ref 8.5–10.5)
CHLORIDE SERPL-SCNC: 106 MMOL/L (ref 96–112)
CO2 SERPL-SCNC: 23 MMOL/L (ref 20–33)
CREAT SERPL-MCNC: 0.72 MG/DL (ref 0.5–1.4)
EKG IMPRESSION: NORMAL
GLUCOSE SERPL-MCNC: 137 MG/DL (ref 65–99)
MAGNESIUM SERPL-MCNC: 1.9 MG/DL (ref 1.5–2.5)
POTASSIUM SERPL-SCNC: 4 MMOL/L (ref 3.6–5.5)
SODIUM SERPL-SCNC: 138 MMOL/L (ref 135–145)
TROPONIN T SERPL-MCNC: 221 NG/L (ref 6–19)
TROPONIN T SERPL-MCNC: 248 NG/L (ref 6–19)
TROPONIN T SERPL-MCNC: 291 NG/L (ref 6–19)
TROPONIN T SERPL-MCNC: 385 NG/L (ref 6–19)
UFH PPP CHRO-ACNC: 0.72 IU/ML

## 2021-04-08 PROCEDURE — 93010 ELECTROCARDIOGRAM REPORT: CPT | Performed by: INTERNAL MEDICINE

## 2021-04-08 PROCEDURE — 93005 ELECTROCARDIOGRAM TRACING: CPT | Performed by: INTERNAL MEDICINE

## 2021-04-08 PROCEDURE — 4A023N7 MEASUREMENT OF CARDIAC SAMPLING AND PRESSURE, LEFT HEART, PERCUTANEOUS APPROACH: ICD-10-PCS | Performed by: INTERNAL MEDICINE

## 2021-04-08 PROCEDURE — 700102 HCHG RX REV CODE 250 W/ 637 OVERRIDE(OP): Performed by: INTERNAL MEDICINE

## 2021-04-08 PROCEDURE — A9270 NON-COVERED ITEM OR SERVICE: HCPCS | Performed by: STUDENT IN AN ORGANIZED HEALTH CARE EDUCATION/TRAINING PROGRAM

## 2021-04-08 PROCEDURE — 700111 HCHG RX REV CODE 636 W/ 250 OVERRIDE (IP): Performed by: EMERGENCY MEDICINE

## 2021-04-08 PROCEDURE — 80048 BASIC METABOLIC PNL TOTAL CA: CPT

## 2021-04-08 PROCEDURE — 92928 PRQ TCAT PLMT NTRAC ST 1 LES: CPT | Mod: LC | Performed by: INTERNAL MEDICINE

## 2021-04-08 PROCEDURE — 92921 PR PRQ TRLUML CORONARY ANGIOPLASTY ADDL BRANCH: CPT | Mod: LD | Performed by: INTERNAL MEDICINE

## 2021-04-08 PROCEDURE — 700102 HCHG RX REV CODE 250 W/ 637 OVERRIDE(OP): Performed by: STUDENT IN AN ORGANIZED HEALTH CARE EDUCATION/TRAINING PROGRAM

## 2021-04-08 PROCEDURE — 700101 HCHG RX REV CODE 250

## 2021-04-08 PROCEDURE — 84484 ASSAY OF TROPONIN QUANT: CPT

## 2021-04-08 PROCEDURE — 700102 HCHG RX REV CODE 250 W/ 637 OVERRIDE(OP)

## 2021-04-08 PROCEDURE — 99233 SBSQ HOSP IP/OBS HIGH 50: CPT | Mod: 25 | Performed by: INTERNAL MEDICINE

## 2021-04-08 PROCEDURE — 99152 MOD SED SAME PHYS/QHP 5/>YRS: CPT | Performed by: INTERNAL MEDICINE

## 2021-04-08 PROCEDURE — 85520 HEPARIN ASSAY: CPT

## 2021-04-08 PROCEDURE — B2151ZZ FLUOROSCOPY OF LEFT HEART USING LOW OSMOLAR CONTRAST: ICD-10-PCS | Performed by: INTERNAL MEDICINE

## 2021-04-08 PROCEDURE — 700117 HCHG RX CONTRAST REV CODE 255: Performed by: INTERNAL MEDICINE

## 2021-04-08 PROCEDURE — A9270 NON-COVERED ITEM OR SERVICE: HCPCS | Performed by: INTERNAL MEDICINE

## 2021-04-08 PROCEDURE — 83735 ASSAY OF MAGNESIUM: CPT

## 2021-04-08 PROCEDURE — 85347 COAGULATION TIME ACTIVATED: CPT

## 2021-04-08 PROCEDURE — 99232 SBSQ HOSP IP/OBS MODERATE 35: CPT | Performed by: STUDENT IN AN ORGANIZED HEALTH CARE EDUCATION/TRAINING PROGRAM

## 2021-04-08 PROCEDURE — 770020 HCHG ROOM/CARE - TELE (206)

## 2021-04-08 PROCEDURE — A9270 NON-COVERED ITEM OR SERVICE: HCPCS | Performed by: NURSE PRACTITIONER

## 2021-04-08 PROCEDURE — 027135Z DILATION OF CORONARY ARTERY, TWO ARTERIES WITH TWO DRUG-ELUTING INTRALUMINAL DEVICES, PERCUTANEOUS APPROACH: ICD-10-PCS | Performed by: INTERNAL MEDICINE

## 2021-04-08 PROCEDURE — C1887 CATHETER, GUIDING: HCPCS

## 2021-04-08 PROCEDURE — 700111 HCHG RX REV CODE 636 W/ 250 OVERRIDE (IP)

## 2021-04-08 PROCEDURE — B2111ZZ FLUOROSCOPY OF MULTIPLE CORONARY ARTERIES USING LOW OSMOLAR CONTRAST: ICD-10-PCS | Performed by: INTERNAL MEDICINE

## 2021-04-08 PROCEDURE — 93458 L HRT ARTERY/VENTRICLE ANGIO: CPT | Mod: 26,59 | Performed by: INTERNAL MEDICINE

## 2021-04-08 PROCEDURE — 700105 HCHG RX REV CODE 258: Performed by: INTERNAL MEDICINE

## 2021-04-08 PROCEDURE — 700111 HCHG RX REV CODE 636 W/ 250 OVERRIDE (IP): Performed by: NURSE PRACTITIONER

## 2021-04-08 PROCEDURE — A9270 NON-COVERED ITEM OR SERVICE: HCPCS

## 2021-04-08 PROCEDURE — 700102 HCHG RX REV CODE 250 W/ 637 OVERRIDE(OP): Performed by: NURSE PRACTITIONER

## 2021-04-08 RX ORDER — HEPARIN SODIUM 1000 [USP'U]/ML
INJECTION, SOLUTION INTRAVENOUS; SUBCUTANEOUS
Status: COMPLETED
Start: 2021-04-08 | End: 2021-04-08

## 2021-04-08 RX ORDER — DIPHENHYDRAMINE HYDROCHLORIDE 50 MG/ML
INJECTION INTRAMUSCULAR; INTRAVENOUS
Status: COMPLETED
Start: 2021-04-08 | End: 2021-04-08

## 2021-04-08 RX ORDER — HEPARIN SODIUM 200 [USP'U]/100ML
INJECTION, SOLUTION INTRAVENOUS
Status: COMPLETED
Start: 2021-04-08 | End: 2021-04-08

## 2021-04-08 RX ORDER — MIDAZOLAM HYDROCHLORIDE 1 MG/ML
INJECTION INTRAMUSCULAR; INTRAVENOUS
Status: COMPLETED
Start: 2021-04-08 | End: 2021-04-08

## 2021-04-08 RX ORDER — CLOPIDOGREL BISULFATE 75 MG/1
75 TABLET ORAL DAILY
Status: DISCONTINUED | OUTPATIENT
Start: 2021-04-09 | End: 2021-04-09 | Stop reason: HOSPADM

## 2021-04-08 RX ORDER — VERAPAMIL HYDROCHLORIDE 2.5 MG/ML
INJECTION, SOLUTION INTRAVENOUS
Status: COMPLETED
Start: 2021-04-08 | End: 2021-04-08

## 2021-04-08 RX ORDER — ISOSORBIDE MONONITRATE 30 MG/1
TABLET, EXTENDED RELEASE ORAL
Status: COMPLETED
Start: 2021-04-08 | End: 2021-04-08

## 2021-04-08 RX ORDER — CLOPIDOGREL 300 MG/1
TABLET, FILM COATED ORAL
Status: COMPLETED
Start: 2021-04-08 | End: 2021-04-08

## 2021-04-08 RX ORDER — LIDOCAINE HYDROCHLORIDE 20 MG/ML
INJECTION, SOLUTION INFILTRATION; PERINEURAL
Status: COMPLETED
Start: 2021-04-08 | End: 2021-04-08

## 2021-04-08 RX ORDER — CHOLECALCIFEROL (VITAMIN D3) 125 MCG
5 CAPSULE ORAL NIGHTLY
Status: DISCONTINUED | OUTPATIENT
Start: 2021-04-08 | End: 2021-04-09 | Stop reason: HOSPADM

## 2021-04-08 RX ORDER — CLOPIDOGREL 300 MG/1
600 TABLET, FILM COATED ORAL ONCE
Status: COMPLETED | OUTPATIENT
Start: 2021-04-08 | End: 2021-04-08

## 2021-04-08 RX ADMIN — DIPHENHYDRAMINE HYDROCHLORIDE 50 MG: 50 INJECTION INTRAMUSCULAR; INTRAVENOUS at 13:19

## 2021-04-08 RX ADMIN — SODIUM CHLORIDE: 9 INJECTION, SOLUTION INTRAVENOUS at 16:08

## 2021-04-08 RX ADMIN — ASPIRIN 81 MG: 81 TABLET, CHEWABLE ORAL at 04:10

## 2021-04-08 RX ADMIN — CLOPIDOGREL BISULFATE 600 MG: 300 TABLET, FILM COATED ORAL at 15:15

## 2021-04-08 RX ADMIN — VERAPAMIL HYDROCHLORIDE 2.5 MG: 2.5 INJECTION, SOLUTION INTRAVENOUS at 13:14

## 2021-04-08 RX ADMIN — ATORVASTATIN CALCIUM 80 MG: 80 TABLET, FILM COATED ORAL at 18:00

## 2021-04-08 RX ADMIN — CLOPIDOGREL BISULFATE 600 MG: 300 TABLET, FILM COATED ORAL at 14:14

## 2021-04-08 RX ADMIN — HEPARIN SODIUM: 1000 INJECTION, SOLUTION INTRAVENOUS; SUBCUTANEOUS at 14:13

## 2021-04-08 RX ADMIN — Medication 5 MG: at 00:11

## 2021-04-08 RX ADMIN — ISOSORBIDE MONONITRATE 60 MG: 30 TABLET, EXTENDED RELEASE ORAL at 04:09

## 2021-04-08 RX ADMIN — DOCUSATE SODIUM 50 MG AND SENNOSIDES 8.6 MG 2 TABLET: 8.6; 5 TABLET, FILM COATED ORAL at 18:01

## 2021-04-08 RX ADMIN — Medication 5 MG: at 22:57

## 2021-04-08 RX ADMIN — LISINOPRIL 10 MG: 10 TABLET ORAL at 04:10

## 2021-04-08 RX ADMIN — CARVEDILOL 12.5 MG: 12.5 TABLET, FILM COATED ORAL at 18:00

## 2021-04-08 RX ADMIN — PREDNISONE 50 MG: 50 TABLET ORAL at 04:10

## 2021-04-08 RX ADMIN — CARVEDILOL 12.5 MG: 12.5 TABLET, FILM COATED ORAL at 07:43

## 2021-04-08 RX ADMIN — HEPARIN SODIUM: 1000 INJECTION, SOLUTION INTRAVENOUS; SUBCUTANEOUS at 13:15

## 2021-04-08 RX ADMIN — FENTANYL CITRATE 50 MCG: 50 INJECTION, SOLUTION INTRAMUSCULAR; INTRAVENOUS at 14:07

## 2021-04-08 RX ADMIN — IOHEXOL 113 ML: 350 INJECTION, SOLUTION INTRAVENOUS at 13:45

## 2021-04-08 RX ADMIN — NITROGLYCERIN: 20 INJECTION INTRAVENOUS at 12:00

## 2021-04-08 RX ADMIN — HEPARIN SODIUM 20 UNITS/KG/HR: 5000 INJECTION, SOLUTION INTRAVENOUS at 00:13

## 2021-04-08 RX ADMIN — HEPARIN SODIUM: 200 INJECTION, SOLUTION INTRAVENOUS at 12:00

## 2021-04-08 RX ADMIN — LIDOCAINE HYDROCHLORIDE: 20 INJECTION, SOLUTION INFILTRATION; PERINEURAL at 13:14

## 2021-04-08 RX ADMIN — MIDAZOLAM HYDROCHLORIDE 1.5 MG: 1 INJECTION, SOLUTION INTRAMUSCULAR; INTRAVENOUS at 14:06

## 2021-04-08 RX ADMIN — DIPHENHYDRAMINE HYDROCHLORIDE 50 MG: 25 TABLET ORAL at 10:52

## 2021-04-08 RX ADMIN — PREDNISONE 50 MG: 50 TABLET ORAL at 10:52

## 2021-04-08 RX ADMIN — SODIUM CHLORIDE: 9 INJECTION, SOLUTION INTRAVENOUS at 04:03

## 2021-04-08 SDOH — ECONOMIC STABILITY: FOOD INSECURITY: WITHIN THE PAST 12 MONTHS, YOU WORRIED THAT YOUR FOOD WOULD RUN OUT BEFORE YOU GOT MONEY TO BUY MORE.: NEVER TRUE

## 2021-04-08 SDOH — ECONOMIC STABILITY: TRANSPORTATION INSECURITY
IN THE PAST 12 MONTHS, HAS LACK OF TRANSPORTATION KEPT YOU FROM MEETINGS, WORK, OR FROM GETTING THINGS NEEDED FOR DAILY LIVING?: YES

## 2021-04-08 SDOH — ECONOMIC STABILITY: FOOD INSECURITY: WITHIN THE PAST 12 MONTHS, THE FOOD YOU BOUGHT JUST DIDN'T LAST AND YOU DIDN'T HAVE MONEY TO GET MORE.: NEVER TRUE

## 2021-04-08 SDOH — ECONOMIC STABILITY: TRANSPORTATION INSECURITY
IN THE PAST 12 MONTHS, HAS THE LACK OF TRANSPORTATION KEPT YOU FROM MEDICAL APPOINTMENTS OR FROM GETTING MEDICATIONS?: YES

## 2021-04-08 ASSESSMENT — ENCOUNTER SYMPTOMS
CHILLS: 0
FEVER: 0
BACK PAIN: 0
SHORTNESS OF BREATH: 0
COUGH: 0
STRIDOR: 0
APNEA: 0
VOMITING: 0
HEADACHES: 0
NAUSEA: 0
CHOKING: 0
CHEST TIGHTNESS: 0
PALPITATIONS: 0
EYE PAIN: 0
DIZZINESS: 0
WHEEZING: 0
BLURRED VISION: 0
INSOMNIA: 0
SENSORY CHANGE: 0
ABDOMINAL PAIN: 0
FOCAL WEAKNESS: 0

## 2021-04-08 ASSESSMENT — PAIN DESCRIPTION - PAIN TYPE: TYPE: ACUTE PAIN

## 2021-04-08 ASSESSMENT — LIFESTYLE VARIABLES: SUBSTANCE_ABUSE: 0

## 2021-04-08 ASSESSMENT — SOCIAL DETERMINANTS OF HEALTH (SDOH): HOW HARD IS IT FOR YOU TO PAY FOR THE VERY BASICS LIKE FOOD, HOUSING, MEDICAL CARE, AND HEATING?: NOT VERY HARD

## 2021-04-08 ASSESSMENT — FIBROSIS 4 INDEX: FIB4 SCORE: 2.79

## 2021-04-08 NOTE — CARE PLAN
Problem: Communication  Goal: The ability to communicate needs accurately and effectively will improve  Outcome: PROGRESSING AS EXPECTED     Problem: Safety  Goal: Will remain free from injury  Outcome: PROGRESSING AS EXPECTED  Goal: Will remain free from falls  Outcome: PROGRESSING AS EXPECTED     Problem: Infection  Goal: Will remain free from infection  Outcome: PROGRESSING AS EXPECTED     Problem: Venous Thromboembolism (VTW)/Deep Vein Thrombosis (DVT) Prevention:  Goal: Patient will participate in Venous Thrombosis (VTE)/Deep Vein Thrombosis (DVT)Prevention Measures  Outcome: PROGRESSING AS EXPECTED     Problem: Bowel/Gastric:  Goal: Normal bowel function is maintained or improved  Outcome: PROGRESSING AS EXPECTED  Goal: Will not experience complications related to bowel motility  Outcome: PROGRESSING AS EXPECTED     Problem: Knowledge Deficit  Goal: Knowledge of disease process/condition, treatment plan, diagnostic tests, and medications will improve  Outcome: PROGRESSING AS EXPECTED  Goal: Knowledge of the prescribed therapeutic regimen will improve  Outcome: PROGRESSING AS EXPECTED     Problem: Discharge Barriers/Planning  Goal: Patient's continuum of care needs will be met  Outcome: PROGRESSING AS EXPECTED     Problem: Urinary Elimination:  Goal: Ability to reestablish a normal urinary elimination pattern will improve  Outcome: PROGRESSING AS EXPECTED     Problem: Pain Management  Goal: Pain level will decrease to patient's comfort goal  Outcome: PROGRESSING AS EXPECTED     Problem: Psychosocial Needs:  Goal: Level of anxiety will decrease  Outcome: PROGRESSING AS EXPECTED     Problem: Safety - Medical Restraint  Goal: Remains free of injury from restraints (Restraint for Interference with Medical Device)  Description: INTERVENTIONS:  1. Determine that other, less restrictive measures have been tried or would not be effective before applying the restraint  2. Evaluate the patient's condition at the time  of restraint application  3. Inform patient/family regarding the reason for restraint  4. Q2H: Monitor safety, psychosocial status, comfort, nutrition and hydration  Outcome: PROGRESSING AS EXPECTED  Goal: Free from restraint(s) (Restraint for Interference with Medical Device)  Description: INTERVENTIONS:  1. ONCE/SHIFT or MINIMUM Q12H: Assess and document the continuing need for restraints  2. Q24H: Continued use of restraint requires LIP to perform face to face examination and written order  3. Identify and implement measures to help patient regain control  Outcome: PROGRESSING AS EXPECTED     Problem: Skin Integrity  Goal: Risk for impaired skin integrity will decrease  Outcome: PROGRESSING AS EXPECTED     Problem: Respiratory:  Goal: Respiratory status will improve  Outcome: PROGRESSING AS EXPECTED

## 2021-04-08 NOTE — DOCUMENTATION QUERY
"                                                                         [facility]                                                                       Query Response Note      PATIENT:               TARA NOWAK  ACCT #:                  [id number]  MRN:                     [id number]  :                      1945  ADMIT DATE:       2021 9:56 AM  DISCH DATE:          RESPONDING  PROVIDER #:        [id number]           QUERY TEXT:    \"Encephalopathic\" is documented in the Cardiology Note dated . \"Acute delirium\" is documented in the Hospital Medicine Note dated . Can this documentation be further specified.    NOTE:  If the appropriate response is not listed below, please respond with a new note.    The patient's Clinical Indicators include:   H&P: NSTEMI; HTN Emergency; Hypokalemia (3.3).   BP: 164/100 - 230/117;  BP:139/24 - 178/104   CT-Head: No acute findings   Card. PN: Encephalopathic, required physical/chemical restraints.   MD PN: Acute delirium, kicking staff members   UDS: NEG  Treatment: CT-Head; tx underlying conditions; psych/legal hold; ativan/haldol; lab  Risk Factors: Age; acute illness (NSTEMI; htn emergency)    Thank You,  Kelly Antonio RN  Clinical    Connect via [facility] [employer]  Options provided:   -- Acute encephalopathy, (Please specify type)   -- Delirium due to known physiological condition, (please specify the known physiological condition)   -- Delirium due to general medical condition   -- Delirium due to multiple etiologies   -- Unable to determine      Query created by: Kelly Antonio on 2021 4:24 PM    RESPONSE TEXT:    Delirium due to general medical condition          Electronically signed by:  VICTORIANO MORAN MD 2021 6:00 PM              "

## 2021-04-08 NOTE — PROGRESS NOTES
Received evening report from day RN. Pt is pleasant, and has no complaints. AOx4 and with appropriate actions for hospital stay. Bed is locked in low position with call light and belongings within reach. POC discussed and all questions answered. All needs and requirements met at this time.

## 2021-04-08 NOTE — PROCEDURES
Cardiac Catheterization and Percutaneous Intervention Procedure Report    4/8/2021    Referring MD:     Indication for procedure: ACS >24 hours    Procedures:  · Insertion of 5/6 FR sheath in the right radial artery  · right and left coronary arteriograms  · Left heart catheterization  · Angioplasty and placement of a 3.5 by 15mm Brian drug-eluting stent in mid  left circumflex coronary artery.  · Angioplasty and placement of a 3.5 by 30 mm , 3.0X8mm Hercules drug-eluting  stents in mid  left anterior descending coronary artery.    Final diagnosis:   two vessel coronary artery disease.  Successful percutaneous intervention of left anterior descending and left circumflex coronary artery.    Recommendations: Guideline directed medical therapy and risk factor management      Coronary arteriograms:  Left main: normal  Left anterior descending: Long segment disease in mid portion, resulting in 70% stenosis. This lesion involves diagonal. Distal and apical LAD is free of significant disease  Left circumflex:Prior stent in mid portion, distal stent edge has 90% stenosis, two marginal branches, free of significant disease.  Right coronary: Diffuse mild disease, dominant    Left Heart Catheterization:  Left Ventriculogram: Not performed  Left Ventricular EDP: 34 mm Hg   Aortic Valve Gradient: No significant AV gradient noted    Procedure details:  Sania Viera was brought to the cardiac catheterization lab where the right wrist was prepped and draped in the usual manner for cardiac catheterization.  The area was anesthetized with lidocaine and a 5/6 FR sheath was inserted into the right radial artery without difficulty. A #3.5 left Darrian catheter was advanced to the ostia of the Left coronary artery and arteriograms were recorded.   A #4 right Darrian catheter was advanced to the ostia of the right coronary artery and arteriograms were recorded. Aortic valve was crossed using #4 right Darrian catheter left heart  catheterization and left ventriculogram were performed.  Patient underwent percutaneous coronary revascularization as outlined below.  At the completion of the case the sheath was removed and hemostasis achieved utilizing a radial compression band .  Patient was pain-free and hemodynamically stable at the completion of the case.  There were no apparent complications.    Interventional Procedure LAD:     Given the patient's clinical presentation and coronary anatomy, PCI was indicated and we proceeded with the intervention as detailed below.    Indication for PCI:  NSTE- ACD    Lesion length 30mm.  Calcified lesion, high risk, PATTY-3 flow before and after.  Bifurcation lesion.    EBU 3.5 guide catheter was used to engage left main coronary artery.  Run-through wire was placed in distal LAD after crossing the lesion.  Another run-through wire was placed in the diagonal for protection.  Lesion was dilated using 3.5 x 20 mm noncompliant balloon at 12 ace pressures.  Ostial diagonal is dilated using 2.5 x 12 noncompliant balloon at 12 ace pressure.  A 3.5 x 30 mm Brian drug-eluting stent was placed in the mid LAD.  Appears to have slight distal edge dissection, this was covered using 3.0 x 8 mm Brian drug-eluting stent applied at 18 ace pressures.  Proximal portion of the stent was dilated using 3.75 x 15 mm NC balloon.  Good result, excellent flow into distal LAD, diagonal is pinched but excellent flow.    Interventional Procedure LCX:     Given the patient's clinical presentation and coronary anatomy, PCI was indicated and we proceeded with the intervention as detailed below.    Distal stent edge lesion, 90% stenosis, lesion length is 12 mm, not bifurcation, high risk.  PATTY-3 flow prior, after intervention.    Runthrough wire advanced to left circumflex artery, lesion was crossed.  Lesion was dilated using 3.25 x 15 mm noncompliant balloon at 12 ace pressures.  3.5 x 15 mm Brian drug-eluting stent was placed in mid  circumflex artery in overlapping fashion with prior stent at nominal 11 ace pressures.    Anticoagulant: Heparin  Antiplatelet: Clopidogrel  EBL <25 cc  Complications: none  Specimens: none  Contrast: 113ml  Fluorotime : see cath lab flowsheet      Sedation: I supervised moderate sedation over a trained independent observer.    Sedation start time: 13:22  End time: 14:05          Electronically signed by   Mani Ha M.D., ALYSE  Interventional cardiologist  4/8/2021  2:17 PM

## 2021-04-08 NOTE — PROGRESS NOTES
Community Health Worker Heladio introduced Community Care Management and [facility] to the pateint. Patient reports that she receives $934 a month for social security. Patient is not established with a primary care provider but would like to be scheduled. Patient lives at [facility]. Patient reports no need for food assistance. Patient has a friend for support but does need assistance with transportation. Patient utilizes a walker and a cane at home. Patient's phone number is [phone]. Patient has accepted [facility]. Patient has no other needs at this time.     Community Health Worker Intake  • Social determinates of health intake complete.   • Identified barriers to transportation.   • Contact information provided to Sania Viera   • Has PCP appointment scheduled for 4/20/2021  • Accepted [facility].   • Inpatient assessment completed.    Plan: CHW has discussed R&V and provided the phone number for her senior care plus . CHW scheduled a new patient appointment with Gracie Bauer M.D. on 4/20/2021 @ 11am. CHW will send a referral to [facility]. CHW will follow up after hospitalization.

## 2021-04-08 NOTE — PROGRESS NOTES
Hospital Medicine Daily Progress Note    Date of Service  4/8/2021    Chief Complaint  75 y.o. female admitted 4/5/2021 with chest pain    Hospital Course  75 y.o. female with PMHx CAD s/p IVANIA to OM 2006, breast cancer s/p removal and radiation 2006, dyslipidemia, HTN admitted for evaluation and management of NSTEMI and hypertensive emergency.       Presenting symptoms include 4 days of chest discomfort and pain that initiated in her right ear and jaw and progressed to her left arm. In the ED patient was hypertensive to 200's SBP, troponins were elevated to 182 and continued to trend upward to 257->298, EKG showed diffuse ST segment depression. Cardiology was consulted and recommended NTG and heparin drip before ECHO and  LHC during this hospitalization.    Patient with agitation 4/6, requiring restraints and sedating medications. Patient placed on legal hold as she was not safe to discharge herself home. Legal hold and restraints discontinued 4/7 as patient back to her baseline mentation.    Interval Problem Update  No acute events overnight.  Patient has no complaints.  NPO for LHC today.  On heparin drip, on telemetry.  Cardiology following, appreciate post cath recommendations.    Consultants/Specialty  cardiology    Code Status  Full Code    Disposition  Inpatient, pending LHC today, cardiology recommendations.    Review of Systems  Review of Systems   Constitutional: Negative for chills and fever.   Eyes: Negative for blurred vision and pain.   Respiratory: Negative for cough and shortness of breath.    Cardiovascular: Negative for chest pain, palpitations and leg swelling.   Gastrointestinal: Negative for abdominal pain, nausea and vomiting.   Genitourinary: Negative for dysuria and urgency.   Musculoskeletal: Negative for back pain.   Skin: Negative for itching and rash.   Neurological: Negative for dizziness, sensory change, focal weakness and headaches.   Psychiatric/Behavioral: Negative for substance  abuse. The patient does not have insomnia.         Physical Exam  Temp:  [36 °C (96.8 °F)-36.7 °C (98 °F)] 36.3 °C (97.4 °F)  Pulse:  [69-80] 71  Resp:  [16-18] 18  BP: (130-157)/(71-90) 148/90  SpO2:  [90 %-98 %] 92 %    Physical Exam  Vitals reviewed.   Constitutional:       General: She is not in acute distress.     Appearance: She is not diaphoretic.   HENT:      Head: Normocephalic and atraumatic.      Right Ear: External ear normal.      Left Ear: External ear normal.      Nose: Nose normal. No congestion.      Mouth/Throat:      Pharynx: No oropharyngeal exudate or posterior oropharyngeal erythema.   Eyes:      Extraocular Movements: Extraocular movements intact.      Pupils: Pupils are equal, round, and reactive to light.   Cardiovascular:      Rate and Rhythm: Normal rate and regular rhythm.   Pulmonary:      Effort: Pulmonary effort is normal. No respiratory distress.      Breath sounds: Normal breath sounds. No wheezing.   Abdominal:      General: Bowel sounds are normal. There is no distension.      Palpations: Abdomen is soft.      Tenderness: There is no abdominal tenderness.   Musculoskeletal:         General: No swelling. Normal range of motion.      Cervical back: Normal range of motion and neck supple.   Skin:     General: Skin is warm and dry.   Neurological:      General: No focal deficit present.      Mental Status: She is alert and oriented to person, place, and time.      Sensory: No sensory deficit.      Motor: No weakness.   Psychiatric:         Mood and Affect: Mood normal.         Behavior: Behavior normal.         Fluids    Intake/Output Summary (Last 24 hours) at 4/8/2021 1130  Last data filed at 4/8/2021 0403  Gross per 24 hour   Intake 150 ml   Output --   Net 150 ml       Laboratory  Recent Labs     04/06/21  0116 04/06/21  1812 04/07/21  0008   WBC 9.9 10.0 10.0   RBC 4.61 4.65 4.56   HEMOGLOBIN 14.4 14.3 14.3   HEMATOCRIT 43.8 45.1 42.7   MCV 95.0 97.0 93.6   MCH 31.2 30.8 31.4    MCHC 32.9* 31.7* 33.5*   RDW 47.5 48.3 46.6   PLATELETCT 248 263 251   MPV 10.2 10.7 10.0     Recent Labs     04/06/21  1812 04/07/21  0008 04/08/21  0054   SODIUM 138 136 138   POTASSIUM 3.9 3.7 4.0   CHLORIDE 102 101 106   CO2 26 26 23   GLUCOSE 101* 116* 137*   BUN 10 10 16   CREATININE 0.79 0.57 0.72   CALCIUM 9.0 8.9 8.7     Recent Labs     04/06/21  0116   APTT 87.4*   INR 1.06         Recent Labs     04/06/21  0116   TRIGLYCERIDE 123   HDL 34*   *       Imaging  EC-ECHOCARDIOGRAM COMPLETE W/O CONT   Final Result      CT-HEAD W/O   Final Result      1.  Cerebral atrophy.      2.  White matter lucencies most consistent with small vessel ischemic change versus demyelination or gliosis.      3.  Otherwise, Head CT without contrast with no acute findings. No evidence of acute cerebral infarction, hemorrhage or mass lesion.      DX-CHEST-PORTABLE (1 VIEW)   Final Result      Mild cardiomegaly and possible vascular congestion.      CL-LEFT HEART CATHETERIZATION WITH POSSIBLE INTERVENTION    (Results Pending)        Assessment/Plan  * NSTEMI (non-ST elevated myocardial infarction) (HCC)- (present on admission)  Assessment & Plan  4-day history of episodic substernal chest pain that is triggered on exertion and resolved with rest  H/O CAD status post stent 2005/RCA IVANIA 2006  Troponin 300's, stable, chest pain resolved  NPO, left heart catheterization today.  On heparin drip  Aspirin and high-dose statin  Echocardiogram shows EF 45%, anterolateral wall hypokinesis  Cardiology following, appreciate recommendations    Hypertensive emergency- (present on admission)  Assessment & Plan  improved  SBP in the 200s on presentation  Complicated by cardiac ischemia, troponin elevated  Started on nitroglycerin per cardiology  Captopril 3 times daily as needed  Labetalol as needed    Hypokalemia  Assessment & Plan  Replete as necessary    Dyslipidemia- (present on admission)  Assessment & Plan  Start high-dose statin        VTE prophylaxis: heparin drip

## 2021-04-08 NOTE — PROGRESS NOTES
Cardiology Follow Up Progress Note    Date of Service  4/8/2021    Attending Physician  Asif Mario M.D.    PMH: CAD/MI with PCI to OM 2006, mild to moderate residual disease in LAD and RCA, lost to follow-up, difficulty accessing meds due to financial issues, breast cancer status post radiation and surgery to the right, hypertension, hyperlipidemia, BMI 35    Presented with chest pain that radiates to jaw, left hand, systolic blood pressure~200s    Cardiology consultation for NSTEMI      Interim Events    No overnight cardiac events  Telemetry: Sinus  Denies chest pain/pressure  Encephalopathy, resolved  Keep n.p.o.  Plan for coronary angiography this afternoon  Labs reviewed  NA, K, CR stable  Mildly hypertensive      Review of Systems  Review of Systems   Respiratory: Negative for apnea, cough, choking, chest tightness, shortness of breath, wheezing and stridor.        Vital signs in last 24 hours  Temp:  [36 °C (96.8 °F)-36.6 °C (97.8 °F)] 36.4 °C (97.6 °F)  Pulse:  [69-80] 71  Resp:  [16-18] 18  BP: (130-157)/(71-90) 146/90  SpO2:  [90 %-98 %] 98 %    Physical Exam  Physical Exam  Cardiovascular:      Rate and Rhythm: Normal rate and regular rhythm.   Pulmonary:      Effort: Pulmonary effort is normal.   Skin:     General: Skin is warm.   Neurological:      General: No focal deficit present.      Mental Status: She is alert.   Psychiatric:         Mood and Affect: Mood normal.         Lab Review  Lab Results   Component Value Date/Time    WBC 10.0 04/07/2021 12:08 AM    RBC 4.56 04/07/2021 12:08 AM    HEMOGLOBIN 14.3 04/07/2021 12:08 AM    HEMATOCRIT 42.7 04/07/2021 12:08 AM    MCV 93.6 04/07/2021 12:08 AM    MCH 31.4 04/07/2021 12:08 AM    MCHC 33.5 (L) 04/07/2021 12:08 AM    MPV 10.0 04/07/2021 12:08 AM      Lab Results   Component Value Date/Time    SODIUM 138 04/08/2021 12:54 AM    POTASSIUM 4.0 04/08/2021 12:54 AM    CHLORIDE 106 04/08/2021 12:54 AM    CO2 23 04/08/2021 12:54 AM    GLUCOSE 137 (H)  04/08/2021 12:54 AM    BUN 16 04/08/2021 12:54 AM    CREATININE 0.72 04/08/2021 12:54 AM      Lab Results   Component Value Date/Time    ASTSGOT 46 (H) 04/07/2021 12:08 AM    ALTSGPT 23 04/07/2021 12:08 AM     Lab Results   Component Value Date/Time    CHOLSTRLTOT 173 04/06/2021 01:16 AM     (H) 04/06/2021 01:16 AM    HDL 34 (A) 04/06/2021 01:16 AM    TRIGLYCERIDE 123 04/06/2021 01:16 AM    TROPONINT 221 (H) 04/08/2021 11:56 AM       Recent Labs     04/05/21  1729   NTPROBNP 2276*       Cardiac Imaging and Procedures Review  EKG: Sinus rhythm, inferoposterior infarct    Echocardiogram:  4/7/2021 LVEF 45% hypokinesis of the anterolateral wall    Cardiac Catheterization: Pending    Imaging  Chest X-Ray: Cardiomegaly, vascular congestion    Stress Test: Not applicable pending    Assessment/Plan    NSTEMI, troponin III 79  -Plan for left heart cath this afternoon  -Keep n.p.o.  -IV heparin  -Aspirin, atorvastatin, carvedilol 12.5, isosorbide 60, lisinopril 10  -Echo demonstrated LVEF 45%, hypokinesis of the anterolateral wall    Ischemic cardiomyopathy, LVEF 45%  -GDMT  -Carvedilol, lisinopril    Encephalopathy/delirium  -Resolved    Hypertensive emergency  -/90  -Uptitrate lisinopril    Dyslipidemia  -  -Continue atorvastatin    Cardiology will follow along    History of MI 2006 with PCI to OM, mild to moderate RCA and LAD residual disease      Thank you for allowing me to participate in the care of this patient.      Please contact me with any questions.    MANUEL Vela   Cardiologist, [facility]  [phone]

## 2021-04-08 NOTE — DISCHARGE PLANNING
Anticipated Discharge Disposition: home    Action: Patient scheduled for cath today, currently not medically cleared. RN CM spoke with staff at [facility], the [facility] for [facility] that patient lives at, and it is independent living but they would appreciate an update when it is known when patient will discharge so they can know to check on patient when she is home. Yasmeen Knight at [phone] is the contact for when patient is known to discharge.    Barriers to Discharge: medical clearance    Plan: Case coordination to continue to follow for discharge planning needs

## 2021-04-08 NOTE — PROGRESS NOTES
Patient received post cath procedure and stent placement. VSS on room air. TR band in place to right wrist with 13cc of air. No signs of bleeding. Will deflate TR band 2-4cc q15min until empty and continue to monitor for signs of bleeding.

## 2021-04-09 ENCOUNTER — PHARMACY VISIT (OUTPATIENT)
Dept: PHARMACY | Facility: MEDICAL CENTER | Age: 76
End: 2021-04-09
Payer: COMMERCIAL

## 2021-04-09 ENCOUNTER — APPOINTMENT (OUTPATIENT)
Dept: RADIOLOGY | Facility: MEDICAL CENTER | Age: 76
DRG: 247 | End: 2021-04-09
Attending: NURSE PRACTITIONER
Payer: MEDICARE

## 2021-04-09 VITALS
TEMPERATURE: 97.4 F | BODY MASS INDEX: 34.92 KG/M2 | RESPIRATION RATE: 18 BRPM | HEART RATE: 66 BPM | SYSTOLIC BLOOD PRESSURE: 131 MMHG | OXYGEN SATURATION: 94 % | HEIGHT: 68 IN | DIASTOLIC BLOOD PRESSURE: 72 MMHG | WEIGHT: 230.38 LBS

## 2021-04-09 LAB
ANION GAP SERPL CALC-SCNC: 8 MMOL/L (ref 7–16)
BUN SERPL-MCNC: 17 MG/DL (ref 8–22)
CALCIUM SERPL-MCNC: 8.3 MG/DL (ref 8.5–10.5)
CHLORIDE SERPL-SCNC: 107 MMOL/L (ref 96–112)
CO2 SERPL-SCNC: 23 MMOL/L (ref 20–33)
CREAT SERPL-MCNC: 0.69 MG/DL (ref 0.5–1.4)
ERYTHROCYTE [DISTWIDTH] IN BLOOD BY AUTOMATED COUNT: 47.2 FL (ref 35.9–50)
GLUCOSE SERPL-MCNC: 132 MG/DL (ref 65–99)
HCT VFR BLD AUTO: 35.8 % (ref 37–47)
HGB BLD-MCNC: 11.8 G/DL (ref 12–16)
MCH RBC QN AUTO: 31.4 PG (ref 27–33)
MCHC RBC AUTO-ENTMCNC: 33 G/DL (ref 33.6–35)
MCV RBC AUTO: 95.2 FL (ref 81.4–97.8)
PLATELET # BLD AUTO: 239 K/UL (ref 164–446)
PMV BLD AUTO: 10.4 FL (ref 9–12.9)
POTASSIUM SERPL-SCNC: 3.6 MMOL/L (ref 3.6–5.5)
RBC # BLD AUTO: 3.76 M/UL (ref 4.2–5.4)
SODIUM SERPL-SCNC: 138 MMOL/L (ref 135–145)
TROPONIN T SERPL-MCNC: 548 NG/L (ref 6–19)
WBC # BLD AUTO: 13.2 K/UL (ref 4.8–10.8)

## 2021-04-09 PROCEDURE — RXMED WILLOW AMBULATORY MEDICATION CHARGE: Performed by: STUDENT IN AN ORGANIZED HEALTH CARE EDUCATION/TRAINING PROGRAM

## 2021-04-09 PROCEDURE — 84484 ASSAY OF TROPONIN QUANT: CPT

## 2021-04-09 PROCEDURE — A9270 NON-COVERED ITEM OR SERVICE: HCPCS | Performed by: STUDENT IN AN ORGANIZED HEALTH CARE EDUCATION/TRAINING PROGRAM

## 2021-04-09 PROCEDURE — A9270 NON-COVERED ITEM OR SERVICE: HCPCS | Performed by: INTERNAL MEDICINE

## 2021-04-09 PROCEDURE — 700102 HCHG RX REV CODE 250 W/ 637 OVERRIDE(OP): Performed by: INTERNAL MEDICINE

## 2021-04-09 PROCEDURE — A9270 NON-COVERED ITEM OR SERVICE: HCPCS | Performed by: NURSE PRACTITIONER

## 2021-04-09 PROCEDURE — 700102 HCHG RX REV CODE 250 W/ 637 OVERRIDE(OP): Performed by: STUDENT IN AN ORGANIZED HEALTH CARE EDUCATION/TRAINING PROGRAM

## 2021-04-09 PROCEDURE — 93971 EXTREMITY STUDY: CPT | Mod: 26 | Performed by: INTERNAL MEDICINE

## 2021-04-09 PROCEDURE — 85027 COMPLETE CBC AUTOMATED: CPT

## 2021-04-09 PROCEDURE — 93971 EXTREMITY STUDY: CPT | Mod: LT

## 2021-04-09 PROCEDURE — 80048 BASIC METABOLIC PNL TOTAL CA: CPT

## 2021-04-09 PROCEDURE — 700102 HCHG RX REV CODE 250 W/ 637 OVERRIDE(OP): Performed by: NURSE PRACTITIONER

## 2021-04-09 PROCEDURE — 99239 HOSP IP/OBS DSCHRG MGMT >30: CPT | Performed by: STUDENT IN AN ORGANIZED HEALTH CARE EDUCATION/TRAINING PROGRAM

## 2021-04-09 RX ORDER — ISOSORBIDE MONONITRATE 60 MG/1
60 TABLET, EXTENDED RELEASE ORAL DAILY
Qty: 30 TABLET | Refills: 0 | Status: SHIPPED | OUTPATIENT
Start: 2021-04-10 | End: 2021-04-23 | Stop reason: SDUPTHER

## 2021-04-09 RX ORDER — CLOPIDOGREL BISULFATE 75 MG/1
75 TABLET ORAL DAILY
Qty: 30 TABLET | Refills: 0 | Status: SHIPPED | OUTPATIENT
Start: 2021-04-10 | End: 2021-04-23 | Stop reason: SDUPTHER

## 2021-04-09 RX ORDER — ATORVASTATIN CALCIUM 80 MG/1
80 TABLET, FILM COATED ORAL EVERY EVENING
Qty: 30 TABLET | Refills: 0 | Status: SHIPPED | OUTPATIENT
Start: 2021-04-09 | End: 2021-04-23 | Stop reason: SDUPTHER

## 2021-04-09 RX ORDER — LISINOPRIL 10 MG/1
10 TABLET ORAL DAILY
Qty: 30 TABLET | Refills: 0 | Status: SHIPPED | OUTPATIENT
Start: 2021-04-10 | End: 2021-04-23 | Stop reason: SDUPTHER

## 2021-04-09 RX ORDER — CARVEDILOL 12.5 MG/1
12.5 TABLET ORAL 2 TIMES DAILY WITH MEALS
Qty: 60 TABLET | Refills: 0 | Status: SHIPPED | OUTPATIENT
Start: 2021-04-09 | End: 2021-04-23 | Stop reason: SDUPTHER

## 2021-04-09 RX ADMIN — ASPIRIN 81 MG: 81 TABLET, CHEWABLE ORAL at 06:33

## 2021-04-09 RX ADMIN — ISOSORBIDE MONONITRATE 60 MG: 30 TABLET, EXTENDED RELEASE ORAL at 06:33

## 2021-04-09 RX ADMIN — CLOPIDOGREL BISULFATE 75 MG: 75 TABLET ORAL at 06:35

## 2021-04-09 RX ADMIN — CARVEDILOL 12.5 MG: 12.5 TABLET, FILM COATED ORAL at 08:36

## 2021-04-09 RX ADMIN — LISINOPRIL 10 MG: 10 TABLET ORAL at 06:33

## 2021-04-09 ASSESSMENT — ENCOUNTER SYMPTOMS
STRIDOR: 0
APNEA: 0
SHORTNESS OF BREATH: 0
CHOKING: 0
WHEEZING: 0
CHEST TIGHTNESS: 0
COUGH: 0

## 2021-04-09 ASSESSMENT — PAIN DESCRIPTION - PAIN TYPE
TYPE: ACUTE PAIN
TYPE: ACUTE PAIN

## 2021-04-09 NOTE — DISCHARGE SUMMARY
Discharge Summary    CHIEF COMPLAINT ON ADMISSION  Chief Complaint   Patient presents with   • Chest Pressure     BIB EMS for worsening chest pressure x4 days. Weakness       Reason for Admission  EMS     Admission Date  4/5/2021    CODE STATUS  Full Code    HPI & HOSPITAL COURSE  This is a 75 y.o. female here with history of coronary artery disease status post stenting 2006, who presented with chest pain, found to have NSTEMI with hypertensive emergency. Patient started on heparin drip, ultimately had left heart catheterization which showed LAD and left circumflex stenosis, she had angioplasty and placement of drug eluting stents x3. She was treated with aspirin and plavix, as well as statin, carvedilol, lisinopril. Patient feeling well, she is to follow up with cardiology clinic. Hospitalization also notable for one day of delirium, requiring physical restraints and sedative use. Patient placed on legal hold for that day as she was confused and not able to care for herself yet was agitated demanding to go home. Legal hold and restraints discontinued the following day as patient's delirium resolved.    Therefore, she is discharged in fair and stable condition to home with close outpatient follow-up.    The patient met 2-midnight criteria for an inpatient stay at the time of discharge.    Discharge Date  4/9/2021    FOLLOW UP ITEMS POST DISCHARGE  Take medications as prescribed.  Follow up with cardiology and PCP.    DISCHARGE DIAGNOSES  Principal Problem:    NSTEMI (non-ST elevated myocardial infarction) (HCC) POA: Yes  Active Problems:    Hypertensive emergency POA: Yes    Hypokalemia POA: Unknown    Dyslipidemia (Chronic) POA: Yes  Resolved Problems:    * No resolved hospital problems. *      FOLLOW UP  Future Appointments   Date Time Provider Department Center   4/20/2021 11:00 AM Gracie Bauer M.D. [facility] None     [facility]  [address]  [address]  [facility]  [id number] [phone]  Call  Your doctor has referred you to Cardiac Rehab, which is important to your recovery. Please call to make an appointment.      MEDICATIONS ON DISCHARGE     Medication List      START taking these medications      Instructions   atorvastatin 80 MG tablet  Commonly known as: LIPITOR   Take 1 tablet by mouth every evening for 30 days.  Dose: 80 mg     carvedilol 12.5 MG Tabs  Commonly known as: COREG   Take 1 tablet by mouth 2 times a day with meals for 30 days.  Dose: 12.5 mg     clopidogrel 75 MG Tabs  Start taking on: April 10, 2021  Commonly known as: PLAVIX   Take 1 tablet by mouth every day for 30 days.  Dose: 75 mg     isosorbide mononitrate SR 60 MG Tb24  Start taking on: April 10, 2021  Commonly known as: IMDUR   Take 1 tablet by mouth every day for 30 days.  Dose: 60 mg     lisinopril 10 MG Tabs  Start taking on: April 10, 2021  Commonly known as: PRINIVIL   Take 1 tablet by mouth every day for 30 days.  Dose: 10 mg        CONTINUE taking these medications      Instructions   aspirin 81 MG Chew chewable tablet  Commonly known as: ASA   Take 1 Tab by mouth every day.  Dose: 81 mg            Allergies  Allergies   Allergen Reactions   • Iodine Rash and Itching     She reports rash and severe significant itching after her cardiac catheterization in 2006, unclear if this was the topical scrub or related to IV contrast, for these reasons she does avoid shellfish     • Misc Natural Products Rash     nickel/metal   • Tape Rash     Paper tape ok       DIET  Orders Placed This Encounter   Procedures   • Diet Order Diet: Cardiac     Standing Status:   Standing     Number of Occurrences:   1     Order Specific Question:   Diet:     Answer:   Cardiac [6]   • Discontinue Diet Tray     Standing Status:   Standing     Number of Occurrences:   1       ACTIVITY  As tolerated.  Weight bearing as tolerated    CONSULTATIONS  cardiology    PROCEDURES  PCI, left heart catheterization with IVANIA  placement x3    LABORATORY  Lab Results   Component Value Date    SODIUM 138 04/09/2021    POTASSIUM 3.6 04/09/2021    CHLORIDE 107 04/09/2021    CO2 23 04/09/2021    GLUCOSE 132 (H) 04/09/2021    BUN 17 04/09/2021    CREATININE 0.69 04/09/2021        Lab Results   Component Value Date    WBC 13.2 (H) 04/09/2021    HEMOGLOBIN 11.8 (L) 04/09/2021    HEMATOCRIT 35.8 (L) 04/09/2021    PLATELETCT 239 04/09/2021        Total time of the discharge process exceeds 36 minutes.

## 2021-04-09 NOTE — DISCHARGE PLANNING
Anticipated Discharge Disposition: home     Action: RN CM met with patient at bedside and provided 2nd IMM. She says she has a friend coming in a cab to pick her up. RN CM called and updated Yasmeen at her apartment complex to notify that patient is discharging home today as they do check on residents.     Barriers to Discharge: none    Plan: Patient to discharge home today

## 2021-04-09 NOTE — DISCHARGE INSTRUCTIONS
Discharge Instructions    Discharged to home by car with friend. Discharged via wheelchair, hospital escort: Yes.  Special equipment needed: Not Applicable    Be sure to schedule a follow-up appointment with your primary care doctor or any specialists as instructed.     Discharge Plan:        I understand that a diet low in cholesterol, fat, and sodium is recommended for good health. Unless I have been given specific instructions below for another diet, I accept this instruction as my diet prescription.   Other diet: cardiac    Special Instructions: Diagnosis:  Acute Coronary Syndrome (ACS) is a diagnosis that encompasses cardiac-related chest pain and heart attack. ACS occurs when the blood flow to the heart muscle is severely reduced or cut off completely due to a slow process called atherosclerosis.  Atherosclerosis is a disease in which the coronary arteries become narrow from a buildup of fat, cholesterol, and other substances that combine to form plaque. If the plaque breaks, a blood clot will form and block the blood flow to the heart muscle. This lack of blood flow can cause damage or death to the heart muscle which is called a heart attack or Myocardial Infarction (MI). There are two different types of MIs:  ST Elevation Myocardial Infarction or STEMI (the most severe type of heart attack) and Non-ST Elevation Myocardial Infarction or NSTEMI.    Treatment Plan:  · Cardiac Diet  - Low fat, low salt, low cholesterol   · Cardiac Rehab  - Your doctor has ordered you a referral to [facility].  Call [phone] to schedule an appointment.  · Attend my follow-up appointment with my Cardiologist.  · Take my medications as prescribed by my doctor  · Exercise daily  · Lower my bad cholesterol and raise my good cholesterol, lower my blood pressure and Reduce stress    Medications:  Certain medications are used to treat ACS.  Remember to always take medications as prescribed and never stop talking medications unless  told by your doctor.    You have been prescribed the following medicatons:    Aspirin - Aspirin is used as a blood thinning medication and you will require this medication indefinitely.  Anti-platelet/blood thinner - Your Anti-platelet/Blood thinning medication is called plavix, and is used in combination with aspirin to prevent clots from forming in your heart and/or around your stent.  Your doctor will determine how long you need to be on this medicine.  Beta-Blocker - Beta-Blocker carvedilol is used to lower blood pressure and heart rate, and/or helps your heart heal after a heart attack.  Statin - Statin atorvastatin is used to lower cholesterol.  ACE - lisinopril    · Is patient discharged on Warfarin / Coumadin?   No   Atorvastatin tablets  What is this medicine?  ATORVASTATIN (a TORE va sta tin) is known as a HMG-CoA reductase inhibitor or 'statin'. It lowers the level of cholesterol and triglycerides in the blood. This drug may also reduce the risk of heart attack, stroke, or other health problems in patients with risk factors for heart disease. Diet and lifestyle changes are often used with this drug.  This medicine may be used for other purposes; ask your health care provider or pharmacist if you have questions.  COMMON BRAND NAME(S): Lipitor  What should I tell my health care provider before I take this medicine?  They need to know if you have any of these conditions:  diabetes  if you often drink alcohol  history of stroke  kidney disease  liver disease  muscle aches or weakness  thyroid disease  an unusual or allergic reaction to atorvastatin, other medicines, foods, dyes, or preservatives  pregnant or trying to get pregnant  breast-feeding  How should I use this medicine?  Take this medicine by mouth with a glass of water. Follow the directions on the prescription label. You can take it with or without food. If it upsets your stomach, take it with food. Do not take with grapefruit juice. Take your  medicine at regular intervals. Do not take it more often than directed. Do not stop taking except on your doctor's advice.  Talk to your pediatrician regarding the use of this medicine in children. While this drug may be prescribed for children as young as 10 for selected conditions, precautions do apply.  Overdosage: If you think you have taken too much of this medicine contact a poison control center or emergency room at once.  NOTE: This medicine is only for you. Do not share this medicine with others.  What if I miss a dose?  If you miss a dose, take it as soon as you can. If your next dose is to be taken in less than 12 hours, then do not take the missed dose. Take the next dose at your regular time. Do not take double or extra doses.  What may interact with this medicine?  Do not take this medicine with any of the following medications:  dasabuvir; ombitasvir; paritaprevir; ritonavir  ombitasvir; paritaprevir; ritonavir  posaconazole  red yeast rice  This medicine may also interact with the following medications:  alcohol  birth control pills  certain antibiotics like erythromycin and clarithromycin  certain antivirals for HIV or hepatitis  certain medicines for cholesterol like fenofibrate, gemfibrozil, and niacin  certain medicines for fungal infections like ketoconazole and itraconazole  colchicine  cyclosporine  digoxin  grapefruit juice  rifampin  This list may not describe all possible interactions. Give your health care provider a list of all the medicines, herbs, non-prescription drugs, or dietary supplements you use. Also tell them if you smoke, drink alcohol, or use illegal drugs. Some items may interact with your medicine.  What should I watch for while using this medicine?  Visit your doctor or health care professional for regular check-ups. You may need regular tests to make sure your liver is working properly.  Your health care professional may tell you to stop taking this medicine if you develop  muscle problems. If your muscle problems do not go away after stopping this medicine, contact your health care professional.  Do not become pregnant while taking this medicine. Women should inform their health care professional if they wish to become pregnant or think they might be pregnant. There is a potential for serious side effects to an unborn child. Talk to your health care professional or pharmacist for more information. Do not breast-feed an infant while taking this medicine.  This medicine may increase blood sugar. Ask your healthcare provider if changes in diet or medicines are needed if you have diabetes.  If you are going to need surgery or other procedure, tell your doctor that you are using this medicine.  This drug is only part of a total heart-health program. Your doctor or a dietician can suggest a low-cholesterol and low-fat diet to help. Avoid alcohol and smoking, and keep a proper exercise schedule.  This medicine may cause a decrease in Co-Enzyme Q-10. You should make sure that you get enough Co-Enzyme Q-10 while you are taking this medicine. Discuss the foods you eat and the vitamins you take with your health care professional.  What side effects may I notice from receiving this medicine?  Side effects that you should report to your doctor or health care professional as soon as possible:  allergic reactions like skin rash, itching or hives, swelling of the face, lips, or tongue  fever  joint pain  loss of memory  redness, blistering, peeling or loosening of the skin, including inside the mouth  signs and symptoms of high blood sugar such as being more thirsty or hungry or having to urinate more than normal. You may also feel very tired or have blurry vision.  signs and symptoms of liver injury like dark yellow or brown urine; general ill feeling or flu-like symptoms; light-belly pain; unusually weak or tired; yellowing of the eyes or skin  signs and symptoms of muscle injury like dark urine;  trouble passing urine or change in the amount of urine; unusually weak or tired; muscle pain or side or back pain  Side effects that usually do not require medical attention (report to your doctor or health care professional if they continue or are bothersome):  diarrhea  nausea  stomach pain  trouble sleeping  upset stomach  This list may not describe all possible side effects. Call your doctor for medical advice about side effects. You may report side effects to FDA at 7-346-SYH-0901.  Where should I keep my medicine?  Keep out of the reach of children.  Store between 20 and 25 degrees C (68 and 77 degrees F). Throw away any unused medicine after the expiration date.  NOTE: This sheet is a summary. It may not cover all possible information. If you have questions about this medicine, talk to your doctor, pharmacist, or health care provider.  © 2020 [employer] Standard (2019-10-09 11:36:16)      Depression / Suicide Risk    As you are discharged from this [facility] facility, it is important to learn how to keep safe from harming yourself.    Recognize the warning signs:  · Abrupt changes in personality, positive or negative- including increase in energy   · Giving away possessions  · Change in eating patterns- significant weight changes-  positive or negative  · Change in sleeping patterns- unable to sleep or sleeping all the time   · Unwillingness or inability to communicate  · Depression  · Unusual sadness, discouragement and loneliness  · Talk of wanting to die  · Neglect of personal appearance   · Rebelliousness- reckless behavior  · Withdrawal from people/activities they love  · Confusion- inability to concentrate     If you or a loved one observes any of these behaviors or has concerns about self-harm, here's what you can do:  · Talk about it- your feelings and reasons for harming yourself  · Remove any means that you might use to hurt yourself (examples: pills, rope, extension cords, firearm)  · Get  professional help from the community (Mental Health, Substance Abuse, psychological counseling)  · Do not be alone:Call your Safe Contact- someone whom you trust who will be there for you.  · Call your local CRISIS HOTLINE 402-9863 or 257-595-4438  · Call your local Children's Mobile Crisis Response Team [place] (808) 233-8383 or www.[employer]  · Call the toll free National Suicide Prevention Hotlines   · National Suicide Prevention Lifeline 329-572-KQJQ (1294)  · National Hope Line Network 800-SUICIDE (861-4744)      Carvedilol tablets  What is this medicine?  CARVEDILOL (MONTY ve dil ol) is a beta-blocker. Beta-blockers reduce the workload on the heart and help it to beat more regularly. This medicine is used to treat high blood pressure and heart failure.  This medicine may be used for other purposes; ask your health care provider or pharmacist if you have questions.  COMMON BRAND NAME(S): Coreg  What should I tell my health care provider before I take this medicine?  They need to know if you have any of these conditions:  · circulation problems  · diabetes  · history of heart attack or heart disease  · liver disease  · lung or breathing disease, like asthma or emphysema  · pheochromocytoma  · slow or irregular heartbeat  · thyroid disease  · an unusual or allergic reaction to carvedilol, other beta-blockers, medicines, foods, dyes, or preservatives  · pregnant or trying to get pregnant  · breast-feeding  How should I use this medicine?  Take this medicine by mouth with a glass of water. Follow the directions on the prescription label. It is best to take the tablets with food. Take your doses at regular intervals. Do not take your medicine more often than directed. Do not stop taking except on the advice of your doctor or health care professional.  Talk to your pediatrician regarding the use of this medicine in children. Special care may be needed.  Overdosage: If you think you have taken too much of  this medicine contact a poison control center or emergency room at once.  NOTE: This medicine is only for you. Do not share this medicine with others.  What if I miss a dose?  If you miss a dose, take it as soon as you can. If it is almost time for your next dose, take only that dose. Do not take double or extra doses.  What may interact with this medicine?  This medicine may interact with the following medications:  · certain medicines for blood pressure, heart disease, irregular heart beat  · certain medicines for depression, like fluoxetine or paroxetine  · certain medicines for diabetes, like glipizide or glyburide  · cimetidine  · clonidine  · cyclosporine  · digoxin  · MAOIs like Carbex, Eldepryl, Marplan, Nardil, and Parnate  · reserpine  · rifampin  This list may not describe all possible interactions. Give your health care provider a list of all the medicines, herbs, non-prescription drugs, or dietary supplements you use. Also tell them if you smoke, drink alcohol, or use illegal drugs. Some items may interact with your medicine.  What should I watch for while using this medicine?  Check your heart rate and blood pressure regularly while you are taking this medicine. Ask your doctor or health care professional what your heart rate and blood pressure should be, and when you should contact him or her. Do not stop taking this medicine suddenly. This could lead to serious heart-related effects.  Contact your doctor or health care professional if you have difficulty breathing while taking this drug.  Check your weight daily. Ask your doctor or health care professional when you should notify him/her of any weight gain.  You may get drowsy or dizzy. Do not drive, use machinery, or do anything that requires mental alertness until you know how this medicine affects you. To reduce the risk of dizzy or fainting spells, do not sit or stand up quickly. Alcohol can make you more drowsy, and increase flushing and rapid  heartbeats. Avoid alcoholic drinks.  This medicine may increase blood sugar. Ask your healthcare provider if changes in diet or medicines are needed if you have diabetes.  If you are going to have surgery, tell your doctor or health care professional that you are taking this medicine.  What side effects may I notice from receiving this medicine?  Side effects that you should report to your doctor or health care professional as soon as possible:  · allergic reactions like skin rash, itching or hives, swelling of the face, lips, or tongue  · breathing problems  · dark urine  · irregular heartbeat  ·   signs and symptoms of high blood sugar such as being more thirsty or hungry or having to urinate more than normal. You may also feel very tired or have blurry vision.  · swollen legs or ankles  · vomiting  · yellowing of the eyes or skin  Side effects that usually do not require medical attention (report to your doctor or health care professional if they continue or are bothersome):  · change in sex drive or performance  · diarrhea  · dry eyes (especially if wearing contact lenses)  · dry, itching skin  · headache  · nausea  · unusually tired  This list may not describe all possible side effects. Call your doctor for medical advice about side effects. You may report side effects to FDA at 2-975-FDA-2208.  Where should I keep my medicine?  Keep out of the reach of children.  Store at room temperature below 30 degrees C (86 degrees F). Protect from moisture. Keep container tightly closed. Throw away any unused medicine after the expiration date.  NOTE: This sheet is a summary. It may not cover all possible information. If you have questions about this medicine, talk to your doctor, pharmacist, or health care provider.  © 2020 [employer] Standard (2019-10-09 09:13:57)        Clopidogrel tablets  What is this medicine?  CLOPIDOGREL (kloh PID oh grel) helps to prevent blood clots. This medicine is used to prevent heart  attack, stroke, or other vascular events in people who are at high risk.  This medicine may be used for other purposes; ask your health care provider or pharmacist if you have questions.  COMMON BRAND NAME(S): Plavix  What should I tell my health care provider before I take this medicine?  They need to know if you have any of the following conditions:  · bleeding disorders  · bleeding in the brain  · having surgery  · history of stomach bleeding  · an unusual or allergic reaction to clopidogrel, other medicines, foods, dyes, or preservatives  · pregnant or trying to get pregnant  · breast-feeding  How should I use this medicine?  Take this medicine by mouth with a glass of water. Follow the directions on the prescription label. You may take this medicine with or without food. If it upsets your stomach, take it with food. Take your medicine at regular intervals. Do not take it more often than directed. Do not stop taking except on your doctor's advice.  A special MedGuide will be given to you by the pharmacist with each prescription and refill. Be sure to read this information carefully each time.  Talk to your pediatrician regarding the use of this medicine in children. Special care may be needed.  Overdosage: If you think you have taken too much of this medicine contact a poison control center or emergency room at once.  NOTE: This medicine is only for you. Do not share this medicine with others.  What if I miss a dose?  If you miss a dose, take it as soon as you can. If it is almost time for your next dose, take only that dose. Do not take double or extra doses.  What may interact with this medicine?  Do not take this medicine with the following medications:  · dasabuvir; ombitasvir; paritaprevir; ritonavir  · defibrotide  · selexipag  This medicine may also interact with the following medications:  · certain medicines that treat or prevent blood clots like warfarin  · narcotic medicines for pain  · NSAIDs,  medicines for pain and inflammation, like ibuprofen or naproxen  · repaglinide  · SNRIs, medicines for depression, like desvenlafaxine, duloxetine, levomilnacipran, venlafaxine  · SSRIs, medicines for depression, like citalopram, escitalopram, fluoxetine, fluvoxamine, paroxetine, sertraline  · stomach acid blockers like cimetidine, esomeprazole, omeprazole  This list may not describe all possible interactions. Give your health care provider a list of all the medicines, herbs, non-prescription drugs, or dietary supplements you use. Also tell them if you smoke, drink alcohol, or use illegal drugs. Some items may interact with your medicine.  What should I watch for while using this medicine?  Visit your doctor or health care professional for regular check-ups. Do not stop taking your medicine unless your doctor tells you to.  Notify your doctor or health care professional and seek emergency treatment if you develop breathing problems; changes in vision; chest pain; severe, sudden headache; pain, swelling, warmth in the leg; trouble speaking; sudden numbness or weakness of the face, arm or leg. These can be signs that your condition has gotten worse.  If you are going to have surgery or dental work, tell your doctor or health care professional that you are taking this medicine.  Certain genetic factors may reduce the effect of this medicine. Your doctor may use genetic tests to determine treatment.  Only take aspirin if you are instructed to. Low doses of aspirin are used with this medicine to treat some conditions. Taking aspirin with this medicine can increase your risk of bleeding so you must be careful. Talk to your doctor or pharmacist if you have questions.  What side effects may I notice from receiving this medicine?  Side effects that you should report to your doctor or health care professional as soon as possible:  · allergic reactions like skin rash, itching or hives, swelling of the face, lips, or  tongue  · signs and symptoms of bleeding such as bloody or black, tarry stools; red or dark-brown urine; spitting up blood or brown material that looks like coffee grounds; red spots on the skin; unusual bruising or bleeding from the eye, gums, or nose  · signs and symptoms of a blood clot such as breathing problems; changes in vision; chest pain; severe, sudden headache; pain, swelling, warmth in the leg; trouble speaking; sudden numbness or weakness of the face, arm or leg  · signs and symptoms of low blood sugar such as feeling anxious; confusion; dizziness; increased hunger; unusually weak or tired; increased sweating; shakiness; cold, clammy skin; irritable; headache; blurred vision; fast heartbeat; loss of consciousness  Side effects that usually do not require medical attention (report to your doctor or health care professional if they continue or are bothersome):  · constipation  · diarrhea  · headache  · upset stomach  This list may not describe all possible side effects. Call your doctor for medical advice about side effects. You may report side effects to FDA at 0-125-FDA-6581.  Where should I keep my medicine?  Keep out of the reach of children.  Store at room temperature of 59 to 86 degrees F (15 to 30 degrees C). Throw away any unused medicine after the expiration date.  NOTE: This sheet is a summary. It may not cover all possible information. If you have questions about this medicine, talk to your doctor, pharmacist, or health care provider.  © 2020 [employer] Standard (2019-05-20 15:03:38)        Isosorbide Mononitrate tablets  What is this medicine?  ISOSORBIDE MONONITRATE (eye leidy SOR bide mon oh AMBER trate) is a type of vasodilator. It relaxes blood vessels, increasing the blood and oxygen supply to your heart. This medicine is used to prevent chest pain caused by angina. It will not help to stop an episode of chest pain.  This medicine may be used for other purposes; ask your health care  provider or pharmacist if you have questions.  COMMON BRAND NAME(S): Ismo, Monoket  What should I tell my health care provider before I take this medicine?  They need to know if you have any of these conditions:  · previous heart attack or heart failure  · an unusual or allergic reaction to isosorbide mononitrate, nitrates, other medicines, foods, dyes, or preservatives  · pregnant or trying to get pregnant  · breast-feeding  How should I use this medicine?  Take this medicine by mouth with a glass of water. Follow the directions on the prescription label. Take your medicine at regular intervals. Do not take your medicine more often than directed. Do not stop taking this medicine suddenly or your symptoms may get worse. Ask your doctor or health care professional how to gradually reduce the dose.  Talk to your pediatrician regarding the use of this medicine in children. Special care may be needed.  Overdosage: If you think you have taken too much of this medicine contact a poison control center or emergency room at once.  NOTE: This medicine is only for you. Do not share this medicine with others.  What if I miss a dose?  If you miss a dose, take it as soon as you can. If it is almost time for your next dose, take only that dose. Do not take double or extra doses.  What may interact with this medicine?  Do not take this medicine with any of the following medications:  · medicines used to treat erectile dysfunction (ED) like avanafil, sildenafil, tadalafil, and vardenafil  · riociguat  This medicine may also interact with the following medications:  · medicines for high blood pressure  · other medicines for angina or heart failure  This list may not describe all possible interactions. Give your health care provider a list of all the medicines, herbs, non-prescription drugs, or dietary supplements you use. Also tell them if you smoke, drink alcohol, or use illegal drugs. Some items may interact with your  medicine.  What should I watch for while using this medicine?  Check your heart rate and blood pressure regularly while you are taking this medicine. Ask your doctor or health care professional what your heart rate and blood pressure should be and when you should contact him or her. Tell your doctor or health care professional if you feel your medicine is no longer working.  You may get dizzy. Do not drive, use machinery, or do anything that needs mental alertness until you know how this medicine affects you. To reduce the risk of dizzy or fainting spells, do not sit or stand up quickly, especially if you are an older patient. Alcohol can make you more dizzy, and increase flushing and rapid heartbeats. Avoid alcoholic drinks.  Do not treat yourself for coughs, colds, or pain while you are taking this medicine without asking your doctor or health care professional for advice. Some ingredients may increase your blood pressure.  What side effects may I notice from receiving this medicine?  Side effects that you should report to your doctor or health care professional as soon as possible:  · bluish discoloration of lips, fingernails, or palms of hands  · irregular heartbeat, palpitations  · low blood pressure  · nausea, vomiting  · persistent headache  · unusually weak or tired  Side effects that usually do not require medical attention (report to your doctor or health care professional if they continue or are bothersome):  · flushing of the face or neck  · rash  This list may not describe all possible side effects. Call your doctor for medical advice about side effects. You may report side effects to FDA at 7-651-FDA-5180.  Where should I keep my medicine?  Keep out of the reach of children.  Store between 15 and 30 degrees C (59 and 86 degrees F). Keep container tightly closed. Throw away any unused medicine after the expiration date.  NOTE: This sheet is a summary. It may not cover all possible information. If you  have questions about this medicine, talk to your doctor, pharmacist, or health care provider.  © 2020 Elsevier/Gold Standard (2014-10-17 14:48:55)        Lisinopril tablets  What is this medicine?  LISINOPRIL (lyse IN oh pril) is an ACE inhibitor. This medicine is used to treat high blood pressure and heart failure. It is also used to protect the heart immediately after a heart attack.  This medicine may be used for other purposes; ask your health care provider or pharmacist if you have questions.  COMMON BRAND NAME(S): Prinivil, Zestril  What should I tell my health care provider before I take this medicine?  They need to know if you have any of these conditions:  · diabetes  · heart or blood vessel disease  · kidney disease  · low blood pressure  · previous swelling of the tongue, face, or lips with difficulty breathing, difficulty swallowing, hoarseness, or tightening of the throat  · an unusual or allergic reaction to lisinopril, other ACE inhibitors, insect venom, foods, dyes, or preservatives  · pregnant or trying to get pregnant  · breast-feeding  How should I use this medicine?  Take this medicine by mouth with a glass of water. Follow the directions on your prescription label. You may take this medicine with or without food. If it upsets your stomach, take it with food. Take your medicine at regular intervals. Do not take it more often than directed. Do not stop taking except on your doctor's advice.  Talk to your pediatrician regarding the use of this medicine in children. Special care may be needed. While this drug may be prescribed for children as young as 6 years of age for selected conditions, precautions do apply.  Overdosage: If you think you have taken too much of this medicine contact a poison control center or emergency room at once.  NOTE: This medicine is only for you. Do not share this medicine with others.  What if I miss a dose?  If you miss a dose, take it as soon as you can. If it is almost  time for your next dose, take only that dose. Do not take double or extra doses.  What may interact with this medicine?  Do not take this medicine with any of the following medications:  · hymenoptera venom  · sacubitril; valsartan  This medicines may also interact with the following medications:  · aliskiren  · angiotensin receptor blockers, like losartan or valsartan  · certain medicines for diabetes  · diuretics  · everolimus  · gold compounds  · lithium  · NSAIDs, medicines for pain and inflammation, like ibuprofen or naproxen  · potassium salts or supplements  · salt substitutes  · sirolimus  · temsirolimus  This list may not describe all possible interactions. Give your health care provider a list of all the medicines, herbs, non-prescription drugs, or dietary supplements you use. Also tell them if you smoke, drink alcohol, or use illegal drugs. Some items may interact with your medicine.  What should I watch for while using this medicine?  Visit your doctor or health care professional for regular check ups. Check your blood pressure as directed. Ask your doctor what your blood pressure should be, and when you should contact him or her.  Do not treat yourself for coughs, colds, or pain while you are using this medicine without asking your doctor or health care professional for advice. Some ingredients may increase your blood pressure.  Women should inform their doctor if they wish to become pregnant or think they might be pregnant. There is a potential for serious side effects to an unborn child. Talk to your health care professional or pharmacist for more information.  Check with your doctor or health care professional if you get an attack of severe diarrhea, nausea and vomiting, or if you sweat a lot. The loss of too much body fluid can make it dangerous for you to take this medicine.  You may get drowsy or dizzy. Do not drive, use machinery, or do anything that needs mental alertness until you know how this  drug affects you. Do not stand or sit up quickly, especially if you are an older patient. This reduces the risk of dizzy or fainting spells. Alcohol can make you more drowsy and dizzy. Avoid alcoholic drinks.  Avoid salt substitutes unless you are told otherwise by your doctor or health care professional.  What side effects may I notice from receiving this medicine?  Side effects that you should report to your doctor or health care professional as soon as possible:  · allergic reactions like skin rash, itching or hives, swelling of the hands, feet, face, lips, throat, or tongue  · breathing problems  · signs and symptoms of kidney injury like trouble passing urine or change in the amount of urine  · signs and symptoms of increased potassium like muscle weakness; chest pain; or fast, irregular heartbeat  · signs and symptoms of liver injury like dark yellow or brown urine; general ill feeling or flu-like symptoms; light-colored stools; loss of appetite; nausea; right upper belly pain; unusually weak or tired; yellowing of the eyes or skin  · signs and symptoms of low blood pressure like dizziness; feeling faint or lightheaded, falls; unusually weak or tired  · stomach pain with or without nausea and vomiting  Side effects that usually do not require medical attention (report to your doctor or health care professional if they continue or are bothersome):  · changes in taste  · cough  · dizziness  · fever  · headache  · sensitivity to light  This list may not describe all possible side effects. Call your doctor for medical advice about side effects. You may report side effects to FDA at 4-820-FJU-0564.  Where should I keep my medicine?  Keep out of the reach of children.  Store at room temperature between 15 and 30 degrees C (59 and 86 degrees F). Protect from moisture. Keep container tightly closed. Throw away any unused medicine after the expiration date.  NOTE: This sheet is a summary. It may not cover all possible  information. If you have questions about this medicine, talk to your doctor, pharmacist, or health care provider.  © 2020 Elsevier/Gold Standard (2017-02-06 12:52:35)

## 2021-04-09 NOTE — PROGRESS NOTES
Received report from day shift [facility]. Assumed care of pt. Pt reports no needs at this time. Updated pt on plan of care. Pt resting comfortably in bed. Right arm cath site assessed; pulses intact and no reports of numbness of tingling. No sign of bleeding. TR band still in place. Skin and fall precautions in place. Educated on use of call light. Hourly rounding and continuous monitoring in place.

## 2021-04-09 NOTE — PROGRESS NOTES
Cardiology Follow Up Progress Note    Date of Service  4/9/2021    Attending Physician  Asif Mario M.D.    PMH: CAD/MI with PCI to OM 2006, mild to moderate residual disease in LAD and RCA, lost to follow-up, difficulty accessing meds due to financial issues, breast cancer status post radiation and surgery to the right, hypertension, hyperlipidemia, BMI 35    Presented with chest pain that radiates to jaw, left hand, systolic blood pressure~200s    Cardiology consultation for NSTEMI      Interim Events    No overnight cardiac events  Telemetry: Sinus  Denies chest pain/pressure  Encephalopathy, resolved  Underwent coronary angiography yesterday showed two vessel coronary artery disease.  Successful percutaneous intervention of left anterior descending and left circumflex coronary artery, 4/8/2021  Labs reviewed  NA, K, CR stable  /70      Review of Systems  Review of Systems   Respiratory: Negative for apnea, cough, choking, chest tightness, shortness of breath, wheezing and stridor.        Vital signs in last 24 hours  Temp:  [36.1 °C (96.9 °F)-36.9 °C (98.5 °F)] 36.3 °C (97.4 °F)  Pulse:  [62-75] 66  Resp:  [16-18] 18  BP: (121-176)/(68-95) 131/72  SpO2:  [92 %-98 %] 94 %    Physical Exam  Physical Exam  Cardiovascular:      Rate and Rhythm: Normal rate and regular rhythm.   Pulmonary:      Effort: Pulmonary effort is normal.   Skin:     General: Skin is warm.      Comments: Right radial cath site without evidence of hematoma   Neurological:      General: No focal deficit present.      Mental Status: She is alert.   Psychiatric:         Mood and Affect: Mood normal.         Lab Review  Lab Results   Component Value Date/Time    WBC 13.2 (H) 04/09/2021 12:07 AM    RBC 3.76 (L) 04/09/2021 12:07 AM    HEMOGLOBIN 11.8 (L) 04/09/2021 12:07 AM    HEMATOCRIT 35.8 (L) 04/09/2021 12:07 AM    MCV 95.2 04/09/2021 12:07 AM    MCH 31.4 04/09/2021 12:07 AM    MCHC 33.0 (L) 04/09/2021 12:07 AM    MPV 10.4 04/09/2021  12:07 AM      Lab Results   Component Value Date/Time    SODIUM 138 04/09/2021 12:07 AM    POTASSIUM 3.6 04/09/2021 12:07 AM    CHLORIDE 107 04/09/2021 12:07 AM    CO2 23 04/09/2021 12:07 AM    GLUCOSE 132 (H) 04/09/2021 12:07 AM    BUN 17 04/09/2021 12:07 AM    CREATININE 0.69 04/09/2021 12:07 AM      Lab Results   Component Value Date/Time    ASTSGOT 46 (H) 04/07/2021 12:08 AM    ALTSGPT 23 04/07/2021 12:08 AM     Lab Results   Component Value Date/Time    CHOLSTRLTOT 173 04/06/2021 01:16 AM     (H) 04/06/2021 01:16 AM    HDL 34 (A) 04/06/2021 01:16 AM    TRIGLYCERIDE 123 04/06/2021 01:16 AM    TROPONINT 548 (H) 04/09/2021 12:07 AM       No results for input(s): NTPROBNP in the last 72 hours.    Cardiac Imaging and Procedures Review  EKG: Sinus rhythm, inferoposterior infarct    Echocardiogram:  4/7/2021 LVEF 45% hypokinesis of the anterolateral wall    Cardiac Catheterization: 4/8/2021  Procedures:  · Insertion of 5/6 FR sheath in the right radial artery  · right and left coronary arteriograms  · Left heart catheterization  · Angioplasty and placement of a 3.5 by 15mm Gackle drug-eluting stent in mid  left circumflex coronary artery.  · Angioplasty and placement of a 3.5 by 30 mm , 3.0X8mm Brian drug-eluting  stents in mid  left anterior descending coronary artery.     Final diagnosis:   two vessel coronary artery disease.  Successful percutaneous intervention of left anterior descending and left circumflex coronary artery.     Imaging  Chest X-Ray: Cardiomegaly, vascular congestion    Stress Test: Not applicable pending    Assessment/Plan    NSTEMI, troponin 379 NG/L  -s/p successful PTCA/PCI/IVANIA to mLCX & mLAD, 4/8/2021  -DAPT, the form of aspirin 81, Plavix 75 mg, along with atorvastatin 80, carvedilol 12.5, lisinopril 10  -Echo demonstrated LVEF 45%, hypokinesis of the anterolateral wall    Ischemic cardiomyopathy, LVEF 45%  -GDMT  -Carvedilol,  lisinopril    Encephalopathy/delirium  -Resolved    Hypertensive emergency  -Stable on current medical therapy  -Systolic blood pressure 130s on discharge      Dyslipidemia  -  -Continue atorvastatin      History of MI 2006 with PCI to OM, mild to moderate RCA and LAD residual disease    Follow-up with our cardiology office in 2-3  weeks, will arrange.    Thank you for allowing me to participate in the care of this patient.      Please contact me with any questions.    RASHID Vela.   Cardiologist, [facility]  [phone]

## 2021-04-09 NOTE — PROGRESS NOTES
Patient's friend here in the ER parking lot to pick patient up in a taxi cab.  Patient assisted out to the ER parking lot and into the cab with her friend.

## 2021-04-09 NOTE — PROGRESS NOTES
Patient arrived to the discharge lounge.  Meds to Beds Pharmacist in the delivered medications to patient.  Medication education provided to patient by pharmacist.

## 2021-04-09 NOTE — PROGRESS NOTES
"Left arm noted to be increased in swelling and tenderness. Pt states it feels very \"tight\" and difficult to bend. Warm and red. Warm pack placed and pillow used for elevation. Concerns to be addressed with day team for possible need for ultrasound.   " No

## 2021-04-09 NOTE — THERAPY
Therapy contact Note    Patient Name: Sania Viera  Age:  75 y.o., Sex:  female  Medical Record #: [id number]  Today's Date: 4/9/2021    Post percutaneous intervention of left anterior descending and left circumflex coronary artery.  Pt issued education materials on use of RPE scale and post cardiac cath activity. Verbalized understanding.  Pt discharged today.

## 2021-04-09 NOTE — CARE PLAN
Problem: Safety  Goal: Will remain free from injury  Outcome: PROGRESSING AS EXPECTED     Problem: Pain Management  Goal: Pain level will decrease to patient's comfort goal  Outcome: PROGRESSING AS EXPECTED     Problem: Venous Thromboembolism (VTW)/Deep Vein Thrombosis (DVT) Prevention:  Goal: Patient will participate in Venous Thrombosis (VTE)/Deep Vein Thrombosis (DVT)Prevention Measures  Outcome: PROGRESSING AS EXPECTED

## 2021-04-09 NOTE — CARE PLAN
Problem: Safety  Goal: Will remain free from injury  Outcome: PROGRESSING AS EXPECTED  Goal: Will remain free from falls  Outcome: PROGRESSING AS EXPECTED     Problem: Infection  Goal: Will remain free from infection  Outcome: PROGRESSING AS EXPECTED     Problem: Venous Thromboembolism (VTW)/Deep Vein Thrombosis (DVT) Prevention:  Goal: Patient will participate in Venous Thrombosis (VTE)/Deep Vein Thrombosis (DVT)Prevention Measures  Outcome: PROGRESSING AS EXPECTED     Problem: Bowel/Gastric:  Goal: Normal bowel function is maintained or improved  Outcome: PROGRESSING AS EXPECTED  Goal: Will not experience complications related to bowel motility  Outcome: PROGRESSING AS EXPECTED     Problem: Knowledge Deficit  Goal: Knowledge of disease process/condition, treatment plan, diagnostic tests, and medications will improve  Outcome: PROGRESSING AS EXPECTED  Goal: Knowledge of the prescribed therapeutic regimen will improve  Outcome: PROGRESSING AS EXPECTED     Problem: Discharge Barriers/Planning  Goal: Patient's continuum of care needs will be met  Outcome: PROGRESSING AS EXPECTED     Problem: Urinary Elimination:  Goal: Ability to reestablish a normal urinary elimination pattern will improve  Outcome: PROGRESSING AS EXPECTED     Problem: Pain Management  Goal: Pain level will decrease to patient's comfort goal  Outcome: PROGRESSING AS EXPECTED     Problem: Psychosocial Needs:  Goal: Level of anxiety will decrease  Outcome: PROGRESSING AS EXPECTED     Problem: Safety - Medical Restraint  Goal: Remains free of injury from restraints (Restraint for Interference with Medical Device)  Description: INTERVENTIONS:  1. Determine that other, less restrictive measures have been tried or would not be effective before applying the restraint  2. Evaluate the patient's condition at the time of restraint application  3. Inform patient/family regarding the reason for restraint  4. Q2H: Monitor safety, psychosocial status, comfort,  nutrition and hydration  Outcome: PROGRESSING AS EXPECTED  Goal: Free from restraint(s) (Restraint for Interference with Medical Device)  Description: INTERVENTIONS:  1. ONCE/SHIFT or MINIMUM Q12H: Assess and document the continuing need for restraints  2. Q24H: Continued use of restraint requires LIP to perform face to face examination and written order  3. Identify and implement measures to help patient regain control  Outcome: PROGRESSING AS EXPECTED     Problem: Skin Integrity  Goal: Risk for impaired skin integrity will decrease  Outcome: PROGRESSING AS EXPECTED     Problem: Respiratory:  Goal: Respiratory status will improve  Outcome: PROGRESSING AS EXPECTED

## 2021-04-10 NOTE — DISCHARGE PLANNING
Meds-to-Beds: Discharge prescription orders listed below delivered to patient at discharge [facility]. RN Horacio notified. Attempted to  patient, but she was more focused on looking for her transportation. Written information regarding the dispensed prescriptions was provided to the patient including the phone number of the pharmacy to call for any questions. Patient elected to have co-payment billed to patient account.      Sania Viera   Home Medication Instructions VIDA:[id number]    Printed on:04/09/21 1800   Medication Information                      atorvastatin (LIPITOR) 80 MG tablet  Take 1 tablet by mouth every evening for 30 days.             carvedilol (COREG) 12.5 MG Tab  Take 1 tablet by mouth 2 times a day with meals for 30 days.             clopidogrel (PLAVIX) 75 MG Tab  Take 1 tablet by mouth every day for 30 days.             isosorbide mononitrate SR (IMDUR) 60 MG TABLET SR 24 HR  Take 1 tablet by mouth every day for 30 days.             lisinopril (PRINIVIL) 10 MG Tab  Take 1 tablet by mouth every day for 30 days.                 Lay Nguyen, PharmD

## 2021-04-12 ENCOUNTER — PATIENT OUTREACH (OUTPATIENT)
Dept: HEALTH INFORMATION MANAGEMENT | Facility: OTHER | Age: 76
End: 2021-04-12

## 2021-04-12 NOTE — PROGRESS NOTES
Community Health Worker [facility] attempted to reach the patient to follow up after hospitalization. Patient did not answer and has a voicemail box that has not been set up yet.     CHW will attempt again.

## 2021-04-15 ENCOUNTER — NURSE TRIAGE (OUTPATIENT)
Dept: HEALTH INFORMATION MANAGEMENT | Facility: OTHER | Age: 76
End: 2021-04-15

## 2021-04-15 NOTE — TELEPHONE ENCOUNTER
"Patient's neighbor concerned about her she says she is very weak, fatigued and has altered mental status. Advised to call 911.     Reason for Disposition  • SEVERE weakness (i.e., unable to walk or barely able to walk, requires support) and new onset or worsening    Additional Information  • Negative: SEVERE difficulty breathing (e.g., struggling for each breath, speaks in single words)  • Negative: Shock suspected (e.g., cold/pale/clammy skin, too weak to stand, low BP, rapid pulse)  • Negative: Difficult to awaken or acting confused (e.g., disoriented, slurred speech)  • Negative: Fainted > 15 minutes ago and still feels too weak or dizzy to stand    Answer Assessment - Initial Assessment Questions  1. DESCRIPTION: \"Describe how you are feeling.\"      Doesn't feel well, very tired  2. SEVERITY: \"How bad is it?\"  \"Can you stand and walk?\"    - MILD - Feels weak or tired, but does not interfere with work, school or normal activities    - MODERATE - Able to stand and walk; weakness interferes with work, school, or normal activities    - SEVERE - Unable to stand or walk      Mild to moderate  3. ONSET:  \"When did the weakness begin?\"      Since discharged  4. CAUSE: \"What do you think is causing the weakness?\"      unsure  5. MEDICINES: \"Have you recently started a new medicine or had a change in the amount of a medicine?\"      many new medicines  6. OTHER SYMPTOMS: \"Do you have any other symptoms?\" (e.g., chest pain, fever, cough, SOB, vomiting, diarrhea, bleeding, other areas of pain)      headache  7. PREGNANCY: \"Is there any chance you are pregnant?\" \"When was your last menstrual period?\"      na    Protocols used: WEAKNESS (GENERALIZED) AND FATIGUE-A-OH      "

## 2021-04-16 NOTE — PROGRESS NOTES
Community Health Worker Heladio attempted to reach the patient to follow up after hospitalization. Patient did not answer and has a voicemail box that has not been set up yet. This was the 3rd attempt.    CHW will remove the patient from CCM caseload due to being unable to contact.

## 2021-04-20 ENCOUNTER — OFFICE VISIT (OUTPATIENT)
Dept: MEDICAL GROUP | Facility: PHYSICIAN GROUP | Age: 76
End: 2021-04-20
Payer: MEDICARE

## 2021-04-20 ENCOUNTER — TELEPHONE (OUTPATIENT)
Dept: MEDICAL GROUP | Facility: PHYSICIAN GROUP | Age: 76
End: 2021-04-20

## 2021-04-20 VITALS
OXYGEN SATURATION: 93 % | SYSTOLIC BLOOD PRESSURE: 138 MMHG | DIASTOLIC BLOOD PRESSURE: 78 MMHG | RESPIRATION RATE: 20 BRPM | HEIGHT: 67 IN | WEIGHT: 226 LBS | BODY MASS INDEX: 35.47 KG/M2 | TEMPERATURE: 98.4 F | HEART RATE: 62 BPM

## 2021-04-20 DIAGNOSIS — I25.83 CORONARY ARTERY DISEASE DUE TO LIPID RICH PLAQUE: Chronic | ICD-10-CM

## 2021-04-20 DIAGNOSIS — C50.911 INFILTRATING DUCTAL CARCINOMA OF RIGHT BREAST (HCC): ICD-10-CM

## 2021-04-20 DIAGNOSIS — I21.4 NSTEMI (NON-ST ELEVATED MYOCARDIAL INFARCTION) (HCC): ICD-10-CM

## 2021-04-20 DIAGNOSIS — D68.69 SECONDARY HYPERCOAGULABILITY DISORDER (HCC): ICD-10-CM

## 2021-04-20 DIAGNOSIS — I25.10 CORONARY ARTERY DISEASE DUE TO LIPID RICH PLAQUE: Chronic | ICD-10-CM

## 2021-04-20 DIAGNOSIS — N39.490 OVERFLOW INCONTINENCE OF URINE: ICD-10-CM

## 2021-04-20 DIAGNOSIS — E66.01 CLASS 2 SEVERE OBESITY DUE TO EXCESS CALORIES WITH SERIOUS COMORBIDITY AND BODY MASS INDEX (BMI) OF 35.0 TO 35.9 IN ADULT (HCC): ICD-10-CM

## 2021-04-20 PROBLEM — I16.1 HYPERTENSIVE EMERGENCY: Status: RESOLVED | Noted: 2021-04-05 | Resolved: 2021-04-20

## 2021-04-20 PROBLEM — R32 INCONTINENCE: Status: ACTIVE | Noted: 2021-04-20

## 2021-04-20 PROCEDURE — 99204 OFFICE O/P NEW MOD 45 MIN: CPT | Performed by: INTERNAL MEDICINE

## 2021-04-20 ASSESSMENT — PATIENT HEALTH QUESTIONNAIRE - PHQ9: CLINICAL INTERPRETATION OF PHQ2 SCORE: 0

## 2021-04-20 ASSESSMENT — FIBROSIS 4 INDEX: FIB4 SCORE: 2.93

## 2021-04-20 NOTE — PROGRESS NOTES
Annual Health Assessment Questions:    1.  Are you currently engaging in any exercise or physical activity? Yes    2.  How would you describe your mood or emotional well-being today? good    3.  Have you had any falls in the last year? No    4.  Have you noticed any problems with your balance or had difficulty walking? Yes    5.  In the last six months have you experienced any leakage of urine? Yes    6. DPA/Advanced Directive: Patient has Advanced Directive, but it is not on file. Instructed to bring in a copy to scan into their chart.

## 2021-04-20 NOTE — PROGRESS NOTES
"    CC: Establish medical care.  Prior PCP Dr. Yruy Jones.    HPI:  Snaia presents with the following    1. NSTEMI (non-ST elevated myocardial infarction) ([facility])  Patient has a history of coronary artery disease, status post non-STEMI, status post stent placement 2006.  Patient is here for a recent hospitalization on April 5, 2021 for non-STEMI with hypertensive emergency.  Patient underwent left heart catheterization found to have LAD and left circumflex stenosis status post angioplasty and 3  drug-eluting stents.  Patient is currently taking Coreg, aspirin, Plavix, Lipitor 80 mg tablets, lisinopril and Imdur.  Patient has greatly improved since leaving the hospital, doing well with an occasional LOVETT but otherwise denies chest pain, shortness of breath, PND, palpitations, lower extremity edema.   patient's previous cardiologist was Dr. Pagan.  She was last seen in 2018.    2. Coronary artery disease due to lipid rich plaque  Refer to #1.    3. Infiltrating ductal carcinoma of right breast (HCC)  Patient was diagnosed with infiltrating ductal breast cancer, ER positive, IA negative.  Patient underwent a partial mastectomy with Dr. Giron in 2018.  Patient underwent radiation therapy for 8 weeks.  Declined chemotherapy.  Patient was then referred to oncologist, Dr. Hatfield, however was only seen once.  Patient declined \"breast cancer pills\".  Patient noticed a lump on her right breast underneath her nipple and is requesting a follow-up mammogram.  Patient has not had a mammogram follow-up since 2018.    4. Class 2 severe obesity due to excess calories with serious comorbidity and body mass index (BMI) of 35.0 to 35.9 in adult ([facility])  Chronic.  Ongoing.  Current weight is 226 pounds, BMI of 35.4.  Patient has gained quite a bit of weight since Covid.  Patient would like to be 190 pounds which was a comfortable weight.    5. Overflow incontinence of urine  Patient has noticed increased urinary incontinence.  " Not wearing protective undergarment.  Patient notices symptoms are worse since increasing weight.    6. Secondary hypercoagulability disorder ([facility])  Chronic.  Ongoing.  Patient is taking aspirin and Plavix secondary to coronary artery disease, recent non-STEMI.      Patient Active Problem List    Diagnosis Date Noted   • NSTEMI (non-ST elevated myocardial infarction) ([facility]) 04/05/2021   • Class 2 severe obesity with serious comorbidity in adult ([facility]) 07/21/2017   • Presence of drug coated stent in right coronary artery    • Coronary artery disease due to lipid rich plaque    • Essential hypertension    • Dyslipidemia    • Infiltrating ductal carcinoma of right breast ([facility]) 04/20/2021   • Incontinence 04/20/2021   • Secondary hypercoagulability disorder ([facility]) 04/20/2021   • Hypokalemia 04/05/2021       Current Outpatient Medications   Medication Sig Dispense Refill   • atorvastatin (LIPITOR) 80 MG tablet Take 1 tablet by mouth every evening for 30 days. 30 tablet 0   • carvedilol (COREG) 12.5 MG Tab Take 1 tablet by mouth 2 times a day with meals for 30 days. 60 tablet 0   • clopidogrel (PLAVIX) 75 MG Tab Take 1 tablet by mouth every day for 30 days. 30 tablet 0   • isosorbide mononitrate SR (IMDUR) 60 MG TABLET SR 24 HR Take 1 tablet by mouth every day for 30 days. 30 tablet 0   • lisinopril (PRINIVIL) 10 MG Tab Take 1 tablet by mouth every day for 30 days. 30 tablet 0   • aspirin (ASA) 81 MG Chew Tab chewable tablet Take 1 Tab by mouth every day. 100 Tab      No current facility-administered medications for this visit.         Allergies as of 04/20/2021 - Reviewed 04/20/2021   Allergen Reaction Noted   • Iodine Rash and Itching 01/17/2017   • Misc natural products Rash 02/23/2018   • Tape Rash 02/23/2018        Social History     Socioeconomic History   • Marital status:      Spouse name: Not on file   • Number of children: Not on file   • Years of education: Not on file   • Highest education level: Not on  file   Occupational History   • Not on file   Tobacco Use   • Smoking status: Former Smoker     Packs/day: 0.50     Years: 25.00     Pack years: 12.50     Types: Cigarettes     Quit date: 1990     Years since quittin.3   • Smokeless tobacco: Never Used   Substance and Sexual Activity   • Alcohol use: Yes     Comment: 4 times a year   • Drug use: No   • Sexual activity: Not on file   Other Topics Concern   • Not on file   Social History Narrative   • Not on file     Social Determinants of Health     Financial Resource Strain: Low Risk    • Difficulty of Paying Living Expenses: Not very hard   Food Insecurity: No Food Insecurity   • Worried About Running Out of Food in the Last Year: Never true   • Ran Out of Food in the Last Year: Never true   Transportation Needs: Unmet Transportation Needs   • Lack of Transportation (Medical): Yes   • Lack of Transportation (Non-Medical): Yes   Physical Activity:    • Days of Exercise per Week:    • Minutes of Exercise per Session:    Stress:    • Feeling of Stress :    Social Connections:    • Frequency of Communication with Friends and Family:    • Frequency of Social Gatherings with Friends and Family:    • Attends Yarsanism Services:    • Active Member of Clubs or Organizations:    • Attends Club or Organization Meetings:    • Marital Status:    Intimate Partner Violence:    • Fear of Current or Ex-Partner:    • Emotionally Abused:    • Physically Abused:    • Sexually Abused:        Family History   Problem Relation Age of Onset   • Clotting Disorder Mother    • Heart Attack Father    • Heart Disease Brother    • Diabetes Brother    • Cancer Maternal Aunt    • Cancer Maternal Aunt        Past Surgical History:   Procedure Laterality Date   • MASTECTOMY Right 3/8/2018    Procedure: MASTECTOMY - PARTIAL;  Surgeon: Ana Giron M.D.;  Location: SURGERY [facility];  Service: General   • NODE BIOPSY SENTINEL Right 3/8/2018    Procedure: NODE BIOPSY SENTINEL -  "AXILLARY;  Surgeon: Ana Giron M.D.;  Location: SURGERY [facility];  Service: General   • BREAST BIOPSY Right 3/1/2018    Procedure: BREAST BIOPSY- EXCISION , MEDIAL LESION, EXCISION OF MASS, LATERAL  AFTER WIRE LOCALIZATION  ;  Surgeon: Ana Giron M.D.;  Location: SURGERY [facility];  Service: General   • ANGIOPLASTY  5/2006    OM IVANIA in [place]   • STENT PLACEMENT  2005    MI   • OTHER  1956    tonsillectomy       ROS: Positive ROS per HPI.    Denies any Headache,Chest pain,  Shortness of breath,  Abdominal pain, Changes of bowel or bladder, Lower ext edema, Fevers, Nights sweats, Focal weakness or numbness.  All other systems are negative.    /78 (BP Location: Left arm, Patient Position: Sitting, BP Cuff Size: Adult)   Pulse 62   Temp 36.9 °C (98.4 °F) (Temporal)   Resp 20   Ht 1.702 m (5' 7\")   Wt 103 kg (226 lb)   SpO2 93%   BMI 35.40 kg/m²      Physical Exam:  Gen:         Alert and oriented, No apparent distress.  HEENT:   Perrla, TM clear,  Oralpharynx no erythema or exudates.  Neck:       No Jugular venous distension, Lymphadenopathy, Thyromegaly, Bruits.  Lungs:     Clear to auscultation bilaterally, no wheezing rhonchi or crackles.  CV:          Regular rate and rhythm. No murmurs, rubs or gallops.  Abd:         Soft non tender, non distended. Normal active bowel sounds.             Ext:          Trace pitting bilateral lower extremity edema.  Skin:        Large resolving ecchymosis on bilateral upper extremity.  Recent hospitalization.     Assessment and Plan.   75 y.o. female presenting with the following.     1. NSTEMI (non-ST elevated myocardial infarction) (HCC)  Chronic.  Stable.  Ongoing.  Hospital records reviewed with patient.  Medications reviewed with patient.  Referral to cardiology  Release of information from Dr. Jones.    2. Coronary artery disease due to lipid rich plaque  Referral to cardiology    3. Infiltrating ductal carcinoma of right breast " ([facility])    - MA-DIAGNOSTIC MAMMO BILAT W/O CAD; Future  Referral to oncology      4. Class 2 severe obesity due to excess calories with serious comorbidity and body mass index (BMI) of 35.0 to 35.9 in adult ([facility])  Briefly discussed diet and lifestyle modifications.  Patient declines referral to weight loss counseling at this time.    5. Overflow incontinence of urine  Recommend weight loss.  Kegel exercises discussed.  Medications discussed.    6. Secondary hypercoagulability disorder ([facility])  Chronic.  Stable.  Continue aspirin and Plavix at this time.

## 2021-04-21 ENCOUNTER — TELEPHONE (OUTPATIENT)
Dept: CARDIOLOGY | Facility: MEDICAL CENTER | Age: 76
End: 2021-04-21

## 2021-04-21 NOTE — TELEPHONE ENCOUNTER
Attempted to call patient to see if he has followed with a cardiologist in the past to get records prior to new patient appt with [facility]. Unable to reach patient, voicemail not set up so unable to leave voicemail.

## 2021-04-23 DIAGNOSIS — I21.4 NSTEMI (NON-ST ELEVATED MYOCARDIAL INFARCTION) (HCC): ICD-10-CM

## 2021-04-27 ENCOUNTER — APPOINTMENT (OUTPATIENT)
Dept: RADIOLOGY | Facility: MEDICAL CENTER | Age: 76
End: 2021-04-27
Attending: INTERNAL MEDICINE
Payer: MEDICARE

## 2021-04-27 RX ORDER — LISINOPRIL 10 MG/1
10 TABLET ORAL DAILY
Qty: 30 TABLET | Refills: 0 | Status: SHIPPED | OUTPATIENT
Start: 2021-04-27 | End: 2021-05-03

## 2021-04-27 RX ORDER — CARVEDILOL 12.5 MG/1
12.5 TABLET ORAL 2 TIMES DAILY WITH MEALS
Qty: 60 TABLET | Refills: 0 | Status: SHIPPED | OUTPATIENT
Start: 2021-04-27 | End: 2021-06-02 | Stop reason: SDUPTHER

## 2021-04-27 RX ORDER — ATORVASTATIN CALCIUM 80 MG/1
80 TABLET, FILM COATED ORAL EVERY EVENING
Qty: 30 TABLET | Refills: 0 | Status: SHIPPED | OUTPATIENT
Start: 2021-04-27 | End: 2021-06-02 | Stop reason: SDUPTHER

## 2021-04-27 RX ORDER — CLOPIDOGREL BISULFATE 75 MG/1
75 TABLET ORAL DAILY
Qty: 30 TABLET | Refills: 0 | Status: SHIPPED | OUTPATIENT
Start: 2021-04-27 | End: 2021-06-02 | Stop reason: SDUPTHER

## 2021-04-27 RX ORDER — ISOSORBIDE MONONITRATE 60 MG/1
60 TABLET, EXTENDED RELEASE ORAL DAILY
Qty: 30 TABLET | Refills: 0 | Status: SHIPPED | OUTPATIENT
Start: 2021-04-27 | End: 2021-05-03

## 2021-05-03 ENCOUNTER — OFFICE VISIT (OUTPATIENT)
Dept: CARDIOLOGY | Facility: MEDICAL CENTER | Age: 76
End: 2021-05-03
Attending: INTERNAL MEDICINE
Payer: MEDICARE

## 2021-05-03 VITALS
HEIGHT: 67 IN | BODY MASS INDEX: 34.53 KG/M2 | OXYGEN SATURATION: 94 % | WEIGHT: 220 LBS | RESPIRATION RATE: 14 BRPM | DIASTOLIC BLOOD PRESSURE: 86 MMHG | HEART RATE: 62 BPM | SYSTOLIC BLOOD PRESSURE: 142 MMHG

## 2021-05-03 DIAGNOSIS — I25.5 ISCHEMIC CARDIOMYOPATHY: ICD-10-CM

## 2021-05-03 DIAGNOSIS — I25.83 CORONARY ARTERY DISEASE DUE TO LIPID RICH PLAQUE: ICD-10-CM

## 2021-05-03 DIAGNOSIS — I10 ESSENTIAL HYPERTENSION: Chronic | ICD-10-CM

## 2021-05-03 DIAGNOSIS — E78.5 DYSLIPIDEMIA: Chronic | ICD-10-CM

## 2021-05-03 DIAGNOSIS — I25.10 CORONARY ARTERY DISEASE DUE TO LIPID RICH PLAQUE: ICD-10-CM

## 2021-05-03 PROCEDURE — 99214 OFFICE O/P EST MOD 30 MIN: CPT | Performed by: INTERNAL MEDICINE

## 2021-05-03 PROCEDURE — RXMED WILLOW AMBULATORY MEDICATION CHARGE: Performed by: FAMILY MEDICINE

## 2021-05-03 RX ORDER — AMLODIPINE BESYLATE AND BENAZEPRIL HYDROCHLORIDE 5; 20 MG/1; MG/1
1 CAPSULE ORAL DAILY
Qty: 90 CAPSULE | Refills: 3 | Status: SHIPPED | OUTPATIENT
Start: 2021-05-03

## 2021-05-03 ASSESSMENT — FIBROSIS 4 INDEX: FIB4 SCORE: 2.93

## 2021-05-03 NOTE — PROGRESS NOTES
"CARDIOLOGY OUTPATIENT FOLLOWUP    PCP: Gracie Bauer M.D.    1. Coronary artery disease due to lipid rich plaque    2. Ischemic cardiomyopathy    3. Essential hypertension    4. Dyslipidemia      Sania Viera is recovering well following recent MI but blood pressure is inadequately controlled.  I recommended replacing lisinopril with benazepril-amlodipine and  as she has been fully revascularized discontinued Imdur.  She will continue on aspirin, Plavix as well as atorvastatin.  I will recheck an echocardiogram and lipid panel in 3 months time.    Follow up: in 4 months    Chief Complaint   Patient presents with   • Coronary Artery Disease     F/V Dx: Coronary artery disease due to lipid rich plaque   • Hypertension     F/V Dx: Essential hypertension   • Dyslipidemia       History: Sania Viera is a 75 y.o. female with medical history of remote PCI, cured breast cancer presenting for follow-up of non-STEMI.  April 5 she was hospitalized with ischemic symptoms and non-STEMI.  LVEF was 45% with anterolateral hypokinesis.  Ultimately, she received PCI of the LAD and OM with IVANIA.  LVEDP was noted to be 32 at the time of angiogram.  Her course was complicated by delirium but otherwise typical regarding the cardiovascular course.    Since discharge she is no longer experiencing the level of exertional dyspnea which took her to the hospital.  She does feel short of breath and tired at times when overworking at home but is easily able to work around these limitations.  She has been walking her dog again and hopes to travel to see her brother and [facility] soon.    She reports her blood pressure has been elevated much of her life and it continues to be elevated at home.      ROS:  All other systems reviewed and negative except as per the HPI    PE:  /86 (BP Location: Left arm, Patient Position: Sitting, BP Cuff Size: Adult)   Pulse 62   Resp 14   Ht 1.702 m (5' 7\")   Wt 99.8 kg (220 lb)   SpO2 " "94%   BMI 34.46 kg/m²   Gen: Well appearing  HEENT: Symmetric face. Anicteric sclerae. Moist mucus membranes  NECK: No JVD. No lymphadenopathy  CARDIAC: Normal PMI, regular, normal S1, S2. without murmur  VASCULATURE: Normal carotid amplitude without bruit.   RESP: Clear to auscultation bilaterally  ABD: Soft, non-tender, non-distended  EXT: No edema, no clubbing or cyanosis  SKIN: Warm and dry  NEURO: No gross deficits  PSYCH: Appropriate affect, participates in conversation    Past Medical History:   Diagnosis Date   • Arthritis     bilat hands   • Bowel habit changes 03/01/2018    Constipatiom   • Breast mass     Right   • Breath shortness     with exertion   • Bronchitis 2003   • CAD (coronary artery disease) 2005    IVANIA to OM   • Coronary artery disease (CAD) excluded 4/20/2021   • Dental disorder     lower plate   • Dyslipidemia    • Essential hypertension    • Hemorrhagic disorder (HCC)     \"easy bleeder'   • High cholesterol    • Incontinence 4/20/2021   • Infiltrating ductal carcinoma of right breast (HCC) 4/20/2021   • Myocardial infarct (HCC) 2005   • Obesity    • Renal disorder     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   • Secondary hypercoagulability disorder (HCC) 4/20/2021   • Urinary incontinence     wears pads     Allergies   Allergen Reactions   • Iodine Rash and Itching     She reports rash and severe significant itching after her cardiac catheterization in 2006, unclear if this was the topical scrub or related to IV contrast, for these reasons she does avoid shellfish     • Misc Natural Products Rash     nickel/metal   • Tape Rash     Paper tape ok     Outpatient Encounter Medications as of 5/3/2021   Medication Sig Dispense Refill   • atorvastatin (LIPITOR) 80 MG tablet Take 1 tablet by mouth every evening for 30 days. 30 tablet 0   • carvedilol (COREG) 12.5 MG Tab Take 1 tablet by mouth 2 times a day with meals for 30 days. 60 tablet 0   • clopidogrel (PLAVIX) 75 MG Tab Take 1 tablet by mouth every " day for 30 days. 30 tablet 0   • isosorbide mononitrate SR (IMDUR) 60 MG TABLET SR 24 HR Take 1 tablet by mouth every day for 30 days. 30 tablet 0   • lisinopril (PRINIVIL) 10 MG Tab Take 1 tablet by mouth every day for 30 days. 30 tablet 0   • aspirin (ASA) 81 MG Chew Tab chewable tablet Take 1 Tab by mouth every day. 100 Tab      No facility-administered encounter medications on file as of 5/3/2021.     Social History     Socioeconomic History   • Marital status:      Spouse name: Not on file   • Number of children: Not on file   • Years of education: Not on file   • Highest education level: Not on file   Occupational History   • Not on file   Tobacco Use   • Smoking status: Former Smoker     Packs/day: 0.50     Years: 25.00     Pack years: 12.50     Types: Cigarettes     Quit date: 1990     Years since quittin.3   • Smokeless tobacco: Never Used   Substance and Sexual Activity   • Alcohol use: Yes     Comment: 4 times a year   • Drug use: No   • Sexual activity: Not on file   Other Topics Concern   • Not on file   Social History Narrative   • Not on file     Social Determinants of Health     Financial Resource Strain: Low Risk    • Difficulty of Paying Living Expenses: Not very hard   Food Insecurity: No Food Insecurity   • Worried About Running Out of Food in the Last Year: Never true   • Ran Out of Food in the Last Year: Never true   Transportation Needs: Unmet Transportation Needs   • Lack of Transportation (Medical): Yes   • Lack of Transportation (Non-Medical): Yes   Physical Activity:    • Days of Exercise per Week:    • Minutes of Exercise per Session:    Stress:    • Feeling of Stress :    Social Connections:    • Frequency of Communication with Friends and Family:    • Frequency of Social Gatherings with Friends and Family:    • Attends [employer] Services:    • Active Member of Clubs or Organizations:    • Attends Club or Organization Meetings:    • Marital Status:    Intimate Partner  Violence:    • Fear of Current or Ex-Partner:    • Emotionally Abused:    • Physically Abused:    • Sexually Abused:        Studies  Lab Results   Component Value Date/Time    CHOLSTRLTOT 173 04/06/2021 01:16 AM     (H) 04/06/2021 01:16 AM    HDL 34 (A) 04/06/2021 01:16 AM    TRIGLYCERIDE 123 04/06/2021 01:16 AM       Lab Results   Component Value Date/Time    SODIUM 138 04/09/2021 12:07 AM    POTASSIUM 3.6 04/09/2021 12:07 AM    CHLORIDE 107 04/09/2021 12:07 AM    CO2 23 04/09/2021 12:07 AM    GLUCOSE 132 (H) 04/09/2021 12:07 AM    BUN 17 04/09/2021 12:07 AM    CREATININE 0.69 04/09/2021 12:07 AM     Lab Results   Component Value Date/Time    ALKPHOSPHAT 69 04/07/2021 12:08 AM    ASTSGOT 46 (H) 04/07/2021 12:08 AM    ALTSGPT 23 04/07/2021 12:08 AM    TBILIRUBIN 0.6 04/07/2021 12:08 AM        For this encounter I reviewed the following medical records Recent ER/hospitalization Notes, Recent H&P or DC summary, BMP, Lipid profile, CBC and Cardiac biomarkers   For this encounter I directly reviewed ECG tracings, Echo images and catherization films. I agree with the interpretations in the electronic health record.

## 2021-05-04 ENCOUNTER — PHARMACY VISIT (OUTPATIENT)
Dept: PHARMACY | Facility: MEDICAL CENTER | Age: 76
End: 2021-05-04
Payer: COMMERCIAL

## 2021-05-17 ENCOUNTER — TELEPHONE (OUTPATIENT)
Dept: CARDIOLOGY | Facility: MEDICAL CENTER | Age: 76
End: 2021-05-17

## 2021-05-17 PROCEDURE — RXMED WILLOW AMBULATORY MEDICATION CHARGE: Performed by: INTERNAL MEDICINE

## 2021-05-17 NOTE — TELEPHONE ENCOUNTER
You  Spike Woods M.D. 1 hour ago (10:55 AM)     Pt hasn't started Lotrel yet, but will provide an update when she does. She also mentioned some blood in urine or vaginal bleeding. Do you want her to keep taking Plavix unless bleeding worsens?      Spike Woods M.D.  You 16 minutes ago (11:59 AM)     Yes needs to continue plavix regardless of bleeding. Should make appt with PCP to discuss the blood situation.      Attempted 2 calls to pt at [phone], but VM has not been set up.

## 2021-05-17 NOTE — TELEPHONE ENCOUNTER
Message  Received: Today  Spike Woods M.D.  Marta Donald R.N.  Can you check in on BP with patient and document. Lotrel started recently     Thanks   [facility]     Called pt. She hasn't started the Lotrel yet because it was mailed to the wrong location. It was re-routed to the correct address and should be arriving soon. She will keep track of BP and call us with an update 2 weeks after starting.    She also mentions that she noticed either blood in her urine or vaginal bleeding. She's not quite sure which. She describes it as a mild amount with some small clots. She's wondering if she can stop Plavix. She didn't take it yesterday and the bleeding improved. We discussed importance of DAPT after PCI, but informed we will call her back with further recommendations.

## 2021-05-18 ENCOUNTER — PHARMACY VISIT (OUTPATIENT)
Dept: PHARMACY | Facility: MEDICAL CENTER | Age: 76
End: 2021-05-18
Payer: COMMERCIAL

## 2021-06-02 ENCOUNTER — OFFICE VISIT (OUTPATIENT)
Dept: MEDICAL GROUP | Facility: PHYSICIAN GROUP | Age: 76
End: 2021-06-02
Payer: MEDICARE

## 2021-06-02 VITALS
HEIGHT: 67 IN | WEIGHT: 222 LBS | BODY MASS INDEX: 34.84 KG/M2 | RESPIRATION RATE: 20 BRPM | TEMPERATURE: 98.5 F | OXYGEN SATURATION: 97 % | HEART RATE: 78 BPM | DIASTOLIC BLOOD PRESSURE: 82 MMHG | SYSTOLIC BLOOD PRESSURE: 138 MMHG

## 2021-06-02 DIAGNOSIS — I10 ESSENTIAL HYPERTENSION: Chronic | ICD-10-CM

## 2021-06-02 DIAGNOSIS — Z95.5 PRESENCE OF DRUG COATED STENT IN RIGHT CORONARY ARTERY: Chronic | ICD-10-CM

## 2021-06-02 DIAGNOSIS — I21.4 NSTEMI (NON-ST ELEVATED MYOCARDIAL INFARCTION) (HCC): ICD-10-CM

## 2021-06-02 DIAGNOSIS — N63.0 BREAST LUMP: ICD-10-CM

## 2021-06-02 DIAGNOSIS — E66.01 CLASS 2 SEVERE OBESITY DUE TO EXCESS CALORIES WITH SERIOUS COMORBIDITY IN ADULT, UNSPECIFIED BMI (HCC): ICD-10-CM

## 2021-06-02 PROCEDURE — 99214 OFFICE O/P EST MOD 30 MIN: CPT | Performed by: INTERNAL MEDICINE

## 2021-06-02 PROCEDURE — RXMED WILLOW AMBULATORY MEDICATION CHARGE: Performed by: INTERNAL MEDICINE

## 2021-06-02 RX ORDER — ATORVASTATIN CALCIUM 80 MG/1
80 TABLET, FILM COATED ORAL EVERY EVENING
Qty: 90 TABLET | Refills: 2 | Status: SHIPPED | OUTPATIENT
Start: 2021-06-02

## 2021-06-02 RX ORDER — CARVEDILOL 12.5 MG/1
12.5 TABLET ORAL 2 TIMES DAILY WITH MEALS
Qty: 180 TABLET | Refills: 2 | Status: SHIPPED | OUTPATIENT
Start: 2021-06-02

## 2021-06-02 RX ORDER — CLOPIDOGREL BISULFATE 75 MG/1
75 TABLET ORAL DAILY
Qty: 90 TABLET | Refills: 3 | Status: SHIPPED | OUTPATIENT
Start: 2021-06-02

## 2021-06-02 ASSESSMENT — FIBROSIS 4 INDEX: FIB4 SCORE: 2.93

## 2021-06-02 NOTE — PROGRESS NOTES
CC: Follow-up medications, hypertension.    HPI:  Sania presents with the following    1. Presence of drug coated stent in right coronary artery  Patient status post recent non-STEMI in April 2021.  Status post 3 stent placement.  Patient was seen by cardiology on May 3.  Patient was continued on her carvedilol and atorvastatin and Plavix.  Patient has noticed vaginal bleeding since on the Plavix and discontinued this medication.  She contacted cardiology and was instructed to restart Plavix despite the bleeding.  Patient was concerned.  Patient has a follow-up echocardiogram scheduled for August.  Patient is stable and denies chest pain, shortness of breath, LOVETT, PND.  No palpitations.  Mild lower extremity edema which is chronic.  Patient requesting her medications be refilled for 90 days.    2. Breast lump  Patient with a history of infiltrating ductal carcinoma of the right breast had concerns of a new breast lump on last visit.  Patient did not complete her diagnostic mammogram.    3. Essential hypertension  In her office visit with cardiology, lisinopril was discontinued and changed to Mavik.  Her blood pressure responded well, averaging 120 over 80s.  Tolerating medication well.    4. Class 2 severe obesity due to excess calories with serious comorbidity in adult, unspecified BMI (HCC)  Current BMI of 34.77, 222 pounds.  Patient has been working hard to lose weight, increase her exercise, walks twice around her block with her dog.  Her goal weight is 190 pounds at this point.  Patient is also eating a low-sodium diet, low-fat diet, decrease portion sizes.    5.  Disposition  Patient will be visiting her brother in [place].  She is considering moving there to be closer to family.    Patient Active Problem List    Diagnosis Date Noted   • Breast lump 06/02/2021   • Infiltrating ductal carcinoma of right breast (HCC) 04/20/2021   • Incontinence 04/20/2021   • Secondary hypercoagulability disorder (HCC)  2021   • NSTEMI (non-ST elevated myocardial infarction) ([facility]) 2021   • Hypokalemia 2021   • Class 2 severe obesity with serious comorbidity in adult ([facility]) 2017   • Presence of drug coated stent in right coronary artery    • CAD (coronary artery disease)    • Essential hypertension    • Dyslipidemia        Current Outpatient Medications   Medication Sig Dispense Refill   • atorvastatin (LIPITOR) 80 MG tablet Take 1 tablet by mouth every evening. 90 tablet 2   • carvedilol (COREG) 12.5 MG Tab Take 1 tablet by mouth 2 times a day with meals. 180 tablet 2   • clopidogrel (PLAVIX) 75 MG Tab Take 1 tablet by mouth every day. 90 tablet 3   • amlodipine-benazepril (LOTREL) 5-20 MG per capsule Take 1 capsule by mouth every day. 90 capsule 3   • aspirin (ASA) 81 MG Chew Tab chewable tablet Take 1 Tab by mouth every day. 100 Tab      No current facility-administered medications for this visit.         Allergies as of 2021 - Reviewed 2021   Allergen Reaction Noted   • Iodine Rash and Itching 2017   • Misc natural products Rash 2018   • Tape Rash 2018        Social History     Socioeconomic History   • Marital status:      Spouse name: Not on file   • Number of children: Not on file   • Years of education: Not on file   • Highest education level: Not on file   Occupational History   • Not on file   Tobacco Use   • Smoking status: Former Smoker     Packs/day: 0.50     Years: 25.00     Pack years: 12.50     Types: Cigarettes     Quit date: 1990     Years since quittin.4   • Smokeless tobacco: Never Used   Substance and Sexual Activity   • Alcohol use: Yes     Comment: 1-2  times a year   • Drug use: No   • Sexual activity: Not Currently   Other Topics Concern   • Not on file   Social History Narrative   • Not on file     Social Determinants of Health     Financial Resource Strain: Low Risk    • Difficulty of Paying Living Expenses: Not very hard   Food  Insecurity: No Food Insecurity   • Worried About Running Out of Food in the Last Year: Never true   • Ran Out of Food in the Last Year: Never true   Transportation Needs: Unmet Transportation Needs   • Lack of Transportation (Medical): Yes   • Lack of Transportation (Non-Medical): Yes   Physical Activity:    • Days of Exercise per Week:    • Minutes of Exercise per Session:    Stress:    • Feeling of Stress :    Social Connections:    • Frequency of Communication with Friends and Family:    • Frequency of Social Gatherings with Friends and Family:    • Attends [employer]od[employer] Services:    • Active Member of Clubs or Organizations:    • Attends Club or Organization Meetings:    • Marital Status:    Intimate Partner Violence:    • Fear of Current or Ex-Partner:    • Emotionally Abused:    • Physically Abused:    • Sexually Abused:        Family History   Problem Relation Age of Onset   • Clotting Disorder Mother    • Heart Attack Father    • Heart Disease Brother    • Diabetes Brother    • Cancer Maternal Aunt    • Cancer Maternal Aunt        Past Surgical History:   Procedure Laterality Date   • MASTECTOMY Right 3/8/2018    Procedure: MASTECTOMY - PARTIAL;  Surgeon: Ana Giron M.D.;  Location: SURGERY [facility];  Service: General   • NODE BIOPSY SENTINEL Right 3/8/2018    Procedure: NODE BIOPSY SENTINEL - AXILLARY;  Surgeon: Ana Giron M.D.;  Location: SURGERY [facility];  Service: General   • BREAST BIOPSY Right 3/1/2018    Procedure: BREAST BIOPSY- EXCISION , MEDIAL LESION, EXCISION OF MASS, LATERAL  AFTER WIRE LOCALIZATION  ;  Surgeon: Ana Giron M.D.;  Location: SURGERY [facility];  Service: General   • ANGIOPLASTY  5/2006    [facility] in [place]   • STENT PLACEMENT  2005    MI   • OTHER  1956    tonsillectomy       ROS: Positive ROS per HPI.    Denies any Headache,Chest pain,  Shortness of breath,  Abdominal pain, Changes of bowel or bladder, Lower ext edema, Fevers, Nights sweats,  "Weight Changes, Focal weakness or numbness.  All other systems are negative.    /82 (BP Location: Right arm, Patient Position: Sitting, BP Cuff Size: Adult long)   Pulse 78   Temp 36.9 °C (98.5 °F) (Temporal)   Resp 20   Ht 1.702 m (5' 7\")   Wt 101 kg (222 lb)   SpO2 97%   BMI 34.77 kg/m²      Physical Exam:  Gen:         Alert and oriented, No apparent distress.  HEENT:   Perrla, TM clear,  Oralpharynx no erythema or exudates.  Neck:       No Jugular venous distension, Lymphadenopathy, Thyromegaly, Bruits.  Lungs:     Clear to auscultation bilaterally, no wheezing rhonchi or crackles.  CV:          Regular rate and rhythm. No murmurs, rubs or gallops.  Abd:         Soft non tender, non distended. Normal active bowel sounds.             Ext:          Trace bilateral lower extremity edema.      Assessment and Plan.   75 y.o. female presenting with the following.     1. Presence of drug coated stent in right coronary artery  Stable.  Advised patient to continue with Plavix 75 mg daily.  Follow-up with echocardiogram in August.  Keep follow-up appointment with cardiology in September.  Continue current plan of care per cardiology.    2. Breast lump  Patient will schedule diagnostic mammogram    3. Essential hypertension  Continue Mavik at current dose.  Monitor blood pressure regularly.    4. Class 2 severe obesity due to excess calories with serious comorbidity in adult, unspecified BMI (HCC)  Counseled patient on diet and lifestyle modifications with weight loss goals.    5. NSTEMI (non-ST elevated myocardial infarction) (HCC)    - clopidogrel (PLAVIX) 75 MG Tab; Take 1 tablet by mouth every day.  Dispense: 90 tablet; Refill: 3    My total time spent caring for the patient on the day of the encounter was 30 minutes.   This does not include time spent on separately billable procedures/tests.    "

## 2021-06-03 ENCOUNTER — PHARMACY VISIT (OUTPATIENT)
Dept: PHARMACY | Facility: MEDICAL CENTER | Age: 76
End: 2021-06-03
Payer: COMMERCIAL

## 2021-07-23 PROCEDURE — RXMED WILLOW AMBULATORY MEDICATION CHARGE: Performed by: INTERNAL MEDICINE

## 2021-07-26 ENCOUNTER — PHARMACY VISIT (OUTPATIENT)
Dept: PHARMACY | Facility: MEDICAL CENTER | Age: 76
End: 2021-07-26
Payer: COMMERCIAL

## 2021-08-03 ENCOUNTER — APPOINTMENT (OUTPATIENT)
Dept: CARDIOLOGY | Facility: MEDICAL CENTER | Age: 76
End: 2021-08-03
Attending: INTERNAL MEDICINE
Payer: MEDICARE

## 2021-08-09 ENCOUNTER — PATIENT OUTREACH (OUTPATIENT)
Dept: HEALTH INFORMATION MANAGEMENT | Facility: OTHER | Age: 76
End: 2021-08-09

## 2021-08-09 NOTE — NON-PROVIDER
Called pt to schedule PATEL. PT was out of town and requested a call back in 1 month.     Attempt # 1

## 2021-08-26 ENCOUNTER — TELEPHONE (OUTPATIENT)
Dept: CARDIOLOGY | Facility: MEDICAL CENTER | Age: 76
End: 2021-08-26

## 2021-08-26 NOTE — TELEPHONE ENCOUNTER
----- Message from Marta Donald R.N. sent at 8/26/2021  4:20 PM PDT -----  If she feels comfortable pushing out the BE appt until she can get the echo done that would be best.   Thank you!  ----- Message -----  From: Monica Piña, Korey Ass't  Sent: 8/26/2021   4:09 PM PDT  To: FARRUKH Kumar,     Patient has an appt scheduled with BE 9/7. Last visit an echo was ordered, but has not been scheduled. Would you like for the patient to keep his appt or schedule the echo first then come into the clinic?

## 2021-09-16 PROCEDURE — RXMED WILLOW AMBULATORY MEDICATION CHARGE: Performed by: INTERNAL MEDICINE

## 2021-09-17 ENCOUNTER — PHARMACY VISIT (OUTPATIENT)
Dept: PHARMACY | Facility: MEDICAL CENTER | Age: 76
End: 2021-09-17
Payer: COMMERCIAL

## 2021-10-06 ENCOUNTER — PHARMACY VISIT (OUTPATIENT)
Dept: PHARMACY | Facility: MEDICAL CENTER | Age: 76
End: 2021-10-06
Payer: COMMERCIAL

## 2021-10-06 PROCEDURE — RXMED WILLOW AMBULATORY MEDICATION CHARGE: Performed by: INTERNAL MEDICINE

## (undated) DEVICE — SET EXTENSION WITH 2 PORTS (48EA/CA) ***PART #2C8610 IS A SUBSTITUTE*****

## (undated) DEVICE — TRAY SKIN SCRUB PVP WET (20EA/CA) PART #DYND70356 DISCONTINUED

## (undated) DEVICE — SODIUM CHL IRRIGATION 0.9% 1000ML (12EA/CA)

## (undated) DEVICE — ELECTRODE DUAL RETURN W/ CORD - (50/PK)

## (undated) DEVICE — GLOVE BIOGEL INDICATOR SZ 6.5 SURGICAL PF LTX - (50PR/BX 4BX/CA)

## (undated) DEVICE — SHEET TRANSVERSE LAP - (12EA/CA)

## (undated) DEVICE — BOVIE BLADE COATED - (50/PK)

## (undated) DEVICE — KIT ROOM DECONTAMINATION

## (undated) DEVICE — COVER PROBE STERILE CONE (12EA/CA)

## (undated) DEVICE — MASK ANESTHESIA ADULT  - (100/CA)

## (undated) DEVICE — SLEEVE, VASO, THIGH, MED

## (undated) DEVICE — CANISTER SUCTION 3000ML MECHANICAL FILTER AUTO SHUTOFF MEDI-VAC NONSTERILE LF DISP  (40EA/CA)

## (undated) DEVICE — NEEDLE NON SAFETY 25 GA X 1 1/2 IN HYPO (100EA/BX)

## (undated) DEVICE — NEPTUNE 4 PORT MANIFOLD - (20/PK)

## (undated) DEVICE — HEAD HOLDER JUNIOR/ADULT

## (undated) DEVICE — TUBING CLEARLINK DUO-VENT - C-FLO (48EA/CA)

## (undated) DEVICE — SUTURE 4-0 VICRYL PLUS FS-2 - 27 INCH (36/BX)

## (undated) DEVICE — ELECTRODE 850 FOAM ADHESIVE - HYDROGEL RADIOTRNSPRNT (50/PK)

## (undated) DEVICE — PACK MAJOR BASIN - (2EA/CA)

## (undated) DEVICE — KIT ANESTHESIA W/CIRCUIT & 3/LT BAG W/FILTER (20EA/CA)

## (undated) DEVICE — BLADE SURGICAL #15 - (50/BX 3BX/CA)

## (undated) DEVICE — DRESSING TRANSPARENT FILM TEGADERM 2.375 X 2.75"  (100EA/BX)"

## (undated) DEVICE — MASK, LARYNGEAL AIRWAY #4

## (undated) DEVICE — SUCTION INSTRUMENT YANKAUER BULBOUS TIP W/O VENT (50EA/CA)

## (undated) DEVICE — TUBE CONNECT SUCTION CLEAR 120 X 1/4" (50EA/CA)"

## (undated) DEVICE — SUTURE GENERAL

## (undated) DEVICE — PACK MINOR BASIN - (2EA/CA)

## (undated) DEVICE — CHLORAPREP 26 ML APPLICATOR - ORANGE TINT(25/CA)

## (undated) DEVICE — SUTURE 3-0 VICRYL PLUS SH - 8X 18 INCH (12/BX)

## (undated) DEVICE — GLOVE BIOGEL SZ 6.5 SURGICAL PF LTX (50PR/BX 4BX/CA)

## (undated) DEVICE — LACTATED RINGERS INJ 1000 ML - (14EA/CA 60CA/PF)

## (undated) DEVICE — SYRINGE 10 ML CONTROL LL (25EA/BX 4BX/CA)

## (undated) DEVICE — GOWN WARMING STANDARD FLEX - (30/CA)

## (undated) DEVICE — PROTECTOR ULNA NERVE - (36PR/CA)

## (undated) DEVICE — SET LEADWIRE 5 LEAD BEDSIDE DISPOSABLE ECG (1SET OF 5/EA)

## (undated) DEVICE — SENSOR SPO2 NEO LNCS ADHESIVE (20/BX) SEE USER NOTES

## (undated) DEVICE — GLOVE BIOGEL PI INDICATOR SZ 6.5 SURGICAL PF LF - (50/BX 4BX/CA)

## (undated) DEVICE — MASK AIRWAY SIZE 3 UNIQUE SILICON (10/BX)

## (undated) DEVICE — LIGASURE SM JAW SEALER CRVD - (6EA/CA)